# Patient Record
Sex: FEMALE | Race: WHITE | NOT HISPANIC OR LATINO | URBAN - METROPOLITAN AREA
[De-identification: names, ages, dates, MRNs, and addresses within clinical notes are randomized per-mention and may not be internally consistent; named-entity substitution may affect disease eponyms.]

---

## 2018-04-04 ENCOUNTER — OUTPATIENT (OUTPATIENT)
Dept: OUTPATIENT SERVICES | Facility: HOSPITAL | Age: 58
LOS: 1 days | End: 2018-04-04
Payer: MEDICARE

## 2018-04-04 VITALS
DIASTOLIC BLOOD PRESSURE: 72 MMHG | HEART RATE: 88 BPM | TEMPERATURE: 98 F | SYSTOLIC BLOOD PRESSURE: 122 MMHG | HEIGHT: 67 IN | OXYGEN SATURATION: 98 % | WEIGHT: 111.99 LBS | RESPIRATION RATE: 12 BRPM

## 2018-04-04 DIAGNOSIS — K05.6 PERIODONTAL DISEASE, UNSPECIFIED: ICD-10-CM

## 2018-04-04 DIAGNOSIS — F73 PROFOUND INTELLECTUAL DISABILITIES: ICD-10-CM

## 2018-04-04 DIAGNOSIS — Z98.890 OTHER SPECIFIED POSTPROCEDURAL STATES: Chronic | ICD-10-CM

## 2018-04-04 DIAGNOSIS — K02.9 DENTAL CARIES, UNSPECIFIED: ICD-10-CM

## 2018-04-04 DIAGNOSIS — Z98.811 DENTAL RESTORATION STATUS: Chronic | ICD-10-CM

## 2018-04-04 DIAGNOSIS — Z01.818 ENCOUNTER FOR OTHER PREPROCEDURAL EXAMINATION: ICD-10-CM

## 2018-04-04 DIAGNOSIS — K02.62 DENTAL CARIES ON SMOOTH SURFACE PENETRATING INTO DENTIN: ICD-10-CM

## 2018-04-04 PROCEDURE — G0463: CPT

## 2018-04-04 RX ORDER — SODIUM CHLORIDE 9 MG/ML
3 INJECTION INTRAMUSCULAR; INTRAVENOUS; SUBCUTANEOUS EVERY 8 HOURS
Qty: 0 | Refills: 0 | Status: DISCONTINUED | OUTPATIENT
Start: 2018-04-18 | End: 2018-05-03

## 2018-04-04 RX ORDER — LIDOCAINE HCL 20 MG/ML
0.2 VIAL (ML) INJECTION ONCE
Qty: 0 | Refills: 0 | Status: DISCONTINUED | OUTPATIENT
Start: 2018-04-18 | End: 2018-05-03

## 2018-04-04 NOTE — H&P PST ADULT - DOCUMENT STATUS
Additional Information: (Optional): The wound was cleaned, and a pressure dressing was applied.  The patient received detailed post-op instructions. Render Post-Care Instructions In Note?: yes Post-Care Instructions: I reviewed with the patient in detail post-care instructions. Patient is to keep the area dry for 48 hours, and not to engage in any swimming until the area is healed. Should the patient develop any fevers, chills, bleeding, severe pain patient will contact the office immediately. Bill As A Line Item Or As Units: Line Item Size Of Lesion After Curettage: 1.7 Consent was obtained from the patient. The risks, benefits and alternatives to therapy were discussed in detail. Specifically, the risks of infection, scarring, bleeding, prolonged wound healing, nerve injury, incomplete removal, allergy to anesthesia and recurrence were addressed. Alternatives to ED&C, such as: surgical removal were also discussed.  Prior to the procedure, the treatment site was clearly identified and confirmed by the patient. All components of Universal Protocol/PAUSE Rule completed. Detail Level: Detailed Bill For Surgical Tray: no Anesthesia Type: 1% lidocaine with epinephrine and a 1:10 solution of 8.4% sodium bicarbonate Cautery Type: cryotherapy Anesthesia Volume In Cc: 2 What Was Performed First?: Curettage Number Of Curettages: 3 Size Of Lesion In Cm: 1.5 Authored by Resident/PA/NP

## 2018-04-04 NOTE — H&P PST ADULT - NSANTHOSAYNRD_GEN_A_CORE
No. JEREMIAS screening performed.  STOP BANG Legend: 0-2 = LOW Risk; 3-4 = INTERMEDIATE Risk; 5-8 = HIGH Risk

## 2018-04-04 NOTE — H&P PST ADULT - HISTORY OF PRESENT ILLNESS
59 yo female with pmh of Autism, anxiety, profound mr, hypersalivation, presents to [facility] for evaluation for comprehensive dental treatment.

## 2018-04-04 NOTE — H&P PST ADULT - PMH
Autism    Bladder infection    Constipation    Dental caries    Gait abnormality    Hernia of abdominal wall    Hypersalivation    Profound mental retardation

## 2018-04-17 ENCOUNTER — TRANSCRIPTION ENCOUNTER (OUTPATIENT)
Age: 58
End: 2018-04-17

## 2018-04-18 ENCOUNTER — OUTPATIENT (OUTPATIENT)
Dept: OUTPATIENT SERVICES | Facility: HOSPITAL | Age: 58
LOS: 1 days | End: 2018-04-18
Payer: MEDICARE

## 2018-04-18 VITALS
DIASTOLIC BLOOD PRESSURE: 59 MMHG | HEART RATE: 62 BPM | TEMPERATURE: 99 F | RESPIRATION RATE: 20 BRPM | SYSTOLIC BLOOD PRESSURE: 118 MMHG | OXYGEN SATURATION: 100 %

## 2018-04-18 VITALS — TEMPERATURE: 98 F | WEIGHT: 111.99 LBS | HEIGHT: 67 IN

## 2018-04-18 DIAGNOSIS — K05.6 PERIODONTAL DISEASE, UNSPECIFIED: ICD-10-CM

## 2018-04-18 DIAGNOSIS — Z98.811 DENTAL RESTORATION STATUS: Chronic | ICD-10-CM

## 2018-04-18 DIAGNOSIS — Z98.890 OTHER SPECIFIED POSTPROCEDURAL STATES: Chronic | ICD-10-CM

## 2018-04-18 DIAGNOSIS — K02.62 DENTAL CARIES ON SMOOTH SURFACE PENETRATING INTO DENTIN: ICD-10-CM

## 2018-04-18 DIAGNOSIS — Z01.818 ENCOUNTER FOR OTHER PREPROCEDURAL EXAMINATION: ICD-10-CM

## 2018-04-18 PROCEDURE — D2392: CPT

## 2018-04-18 PROCEDURE — D2394: CPT

## 2018-04-18 RX ORDER — ACETAMINOPHEN 500 MG
1000 TABLET ORAL ONCE
Qty: 0 | Refills: 0 | Status: COMPLETED | OUTPATIENT
Start: 2018-04-18 | End: 2018-04-18

## 2018-04-18 RX ORDER — ONDANSETRON 8 MG/1
4 TABLET, FILM COATED ORAL ONCE
Qty: 0 | Refills: 0 | Status: DISCONTINUED | OUTPATIENT
Start: 2018-04-18 | End: 2018-05-03

## 2018-04-18 RX ORDER — SODIUM CHLORIDE 9 MG/ML
1000 INJECTION, SOLUTION INTRAVENOUS
Qty: 0 | Refills: 0 | Status: DISCONTINUED | OUTPATIENT
Start: 2018-04-18 | End: 2018-05-03

## 2018-04-18 RX ADMIN — Medication 400 MILLIGRAM(S): at 10:35

## 2018-04-18 NOTE — ASU PREOP CHECKLIST - ASSESSMENT, HISTORY & PHYSICAL COMPLETED AND ON MEDICAL RECORD
----- Message from Nata Lee RN sent at 8/22/2017  1:34 PM CDT -----  Hi. This is Nata Lee RN. I am with the Outpatient case management team with [facility]. I received a referral on the above patient. I spoke with patient today on the telephone.    Could the Multiple Sclerosis- please get in contact with patient to assist patient with any possible assistance.  Thanks in advance.        Please notify patient of your recommendations.     Thank you,  Nata Lee R.N.  Outpatient Complex Case Manager   done

## 2018-04-18 NOTE — BRIEF OPERATIVE NOTE - POST-OP DX
Dental caries extending into dentin  04/18/2018    Active  Marylu Stratton  Periodontal disease  04/18/2018    Active  Marylu Stratton

## 2018-04-18 NOTE — BRIEF OPERATIVE NOTE - PROCEDURE
<<-----Click on this checkbox to enter Procedure Dental exam, with x-ray imaging, dental cleaning, and restoration  04/18/2018    Active  KIMI

## 2018-04-18 NOTE — ASU DISCHARGE PLAN (ADULT/PEDIATRIC). - SPECIAL INSTRUCTIONS
tylenol prn pain    exam, xrays, scaling and root planing, gingivectomy, restorations and flouride tx

## 2018-09-10 NOTE — ASU DISCHARGE PLAN (ADULT/PEDIATRIC). - ASU FOLLOWUP
[facility] ([facility]): Graft Donor Site Epidermal Sutures (Optional): 5-0 Fast Absorbing Gut

## 2019-05-21 ENCOUNTER — TRANSCRIPTION ENCOUNTER (OUTPATIENT)
Age: 59
End: 2019-05-21

## 2020-01-15 PROBLEM — R26.9 UNSPECIFIED ABNORMALITIES OF GAIT AND MOBILITY: Chronic | Status: ACTIVE | Noted: 2018-04-04

## 2020-01-15 PROBLEM — K43.9 VENTRAL HERNIA WITHOUT OBSTRUCTION OR GANGRENE: Chronic | Status: ACTIVE | Noted: 2018-04-04

## 2020-01-15 PROBLEM — K02.9 DENTAL CARIES, UNSPECIFIED: Chronic | Status: ACTIVE | Noted: 2018-04-04

## 2020-01-15 PROBLEM — K11.7 DISTURBANCES OF SALIVARY SECRETION: Chronic | Status: ACTIVE | Noted: 2018-04-04

## 2020-01-15 PROBLEM — F73 PROFOUND INTELLECTUAL DISABILITIES: Chronic | Status: ACTIVE | Noted: 2018-04-04

## 2020-01-21 ENCOUNTER — OUTPATIENT (OUTPATIENT)
Dept: OUTPATIENT SERVICES | Facility: HOSPITAL | Age: 60
LOS: 1 days | End: 2020-01-21
Payer: MEDICARE

## 2020-01-21 VITALS
HEART RATE: 65 BPM | RESPIRATION RATE: 18 BRPM | HEIGHT: 63 IN | SYSTOLIC BLOOD PRESSURE: 114 MMHG | TEMPERATURE: 98 F | OXYGEN SATURATION: 97 % | WEIGHT: 115.96 LBS | DIASTOLIC BLOOD PRESSURE: 77 MMHG

## 2020-01-21 DIAGNOSIS — Z98.890 OTHER SPECIFIED POSTPROCEDURAL STATES: Chronic | ICD-10-CM

## 2020-01-21 DIAGNOSIS — Z01.818 ENCOUNTER FOR OTHER PREPROCEDURAL EXAMINATION: ICD-10-CM

## 2020-01-21 DIAGNOSIS — Z98.811 DENTAL RESTORATION STATUS: Chronic | ICD-10-CM

## 2020-01-21 DIAGNOSIS — K02.9 DENTAL CARIES, UNSPECIFIED: ICD-10-CM

## 2020-01-21 DIAGNOSIS — K05.6 PERIODONTAL DISEASE, UNSPECIFIED: ICD-10-CM

## 2020-01-21 DIAGNOSIS — K02.62 DENTAL CARIES ON SMOOTH SURFACE PENETRATING INTO DENTIN: ICD-10-CM

## 2020-01-21 LAB
ANION GAP SERPL CALC-SCNC: 10 MMOL/L — SIGNIFICANT CHANGE UP (ref 5–17)
BUN SERPL-MCNC: 18 MG/DL — SIGNIFICANT CHANGE UP (ref 7–23)
CALCIUM SERPL-MCNC: 9.1 MG/DL — SIGNIFICANT CHANGE UP (ref 8.4–10.5)
CHLORIDE SERPL-SCNC: 104 MMOL/L — SIGNIFICANT CHANGE UP (ref 96–108)
CO2 SERPL-SCNC: 26 MMOL/L — SIGNIFICANT CHANGE UP (ref 22–31)
CREAT SERPL-MCNC: 0.61 MG/DL — SIGNIFICANT CHANGE UP (ref 0.5–1.3)
GLUCOSE SERPL-MCNC: 67 MG/DL — LOW (ref 70–99)
HCT VFR BLD CALC: 40.7 % — SIGNIFICANT CHANGE UP (ref 34.5–45)
HGB BLD-MCNC: 12.9 G/DL — SIGNIFICANT CHANGE UP (ref 11.5–15.5)
MCHC RBC-ENTMCNC: 29.4 PG — SIGNIFICANT CHANGE UP (ref 27–34)
MCHC RBC-ENTMCNC: 31.7 GM/DL — LOW (ref 32–36)
MCV RBC AUTO: 92.7 FL — SIGNIFICANT CHANGE UP (ref 80–100)
PLATELET # BLD AUTO: 286 K/UL — SIGNIFICANT CHANGE UP (ref 150–400)
POTASSIUM SERPL-MCNC: 4.1 MMOL/L — SIGNIFICANT CHANGE UP (ref 3.5–5.3)
POTASSIUM SERPL-SCNC: 4.1 MMOL/L — SIGNIFICANT CHANGE UP (ref 3.5–5.3)
RBC # BLD: 4.39 M/UL — SIGNIFICANT CHANGE UP (ref 3.8–5.2)
RBC # FLD: 13.1 % — SIGNIFICANT CHANGE UP (ref 10.3–14.5)
SODIUM SERPL-SCNC: 140 MMOL/L — SIGNIFICANT CHANGE UP (ref 135–145)
WBC # BLD: 4.05 K/UL — SIGNIFICANT CHANGE UP (ref 3.8–10.5)
WBC # FLD AUTO: 4.05 K/UL — SIGNIFICANT CHANGE UP (ref 3.8–10.5)

## 2020-01-21 PROCEDURE — 80048 BASIC METABOLIC PNL TOTAL CA: CPT

## 2020-01-21 PROCEDURE — 85027 COMPLETE CBC AUTOMATED: CPT

## 2020-01-21 PROCEDURE — G0463: CPT

## 2020-01-21 PROCEDURE — 71045 X-RAY EXAM CHEST 1 VIEW: CPT

## 2020-01-21 PROCEDURE — 71045 X-RAY EXAM CHEST 1 VIEW: CPT | Mod: 26

## 2020-01-21 RX ORDER — LIDOCAINE HCL 20 MG/ML
0.2 VIAL (ML) INJECTION ONCE
Refills: 0 | Status: DISCONTINUED | OUTPATIENT
Start: 2020-02-05 | End: 2020-02-22

## 2020-01-21 RX ORDER — SODIUM CHLORIDE 9 MG/ML
3 INJECTION INTRAMUSCULAR; INTRAVENOUS; SUBCUTANEOUS EVERY 8 HOURS
Refills: 0 | Status: DISCONTINUED | OUTPATIENT
Start: 2020-02-05 | End: 2020-02-22

## 2020-01-21 RX ORDER — FAMOTIDINE 10 MG/ML
1 INJECTION INTRAVENOUS
Qty: 0 | Refills: 0 | DISCHARGE

## 2020-01-21 RX ORDER — LACTOBACILLUS ACIDOPHILUS 100MM CELL
1 CAPSULE ORAL
Qty: 0 | Refills: 0 | DISCHARGE

## 2020-01-21 NOTE — H&P PST ADULT - NSICDXPASTMEDICALHX_GEN_ALL_CORE_FT
PAST MEDICAL HISTORY:  Autism     Constipation     Dental caries     Gait abnormality     Hernia of abdominal wall     Hypersalivation     Profound mental retardation

## 2020-01-21 NOTE — H&P PST ADULT - HISTORY OF PRESENT ILLNESS
This is a 58 y/o female with dental caries, she presents today for comprehensive dental treatment.  history obtained from MD notes and group home staff

## 2020-02-04 ENCOUNTER — TRANSCRIPTION ENCOUNTER (OUTPATIENT)
Age: 60
End: 2020-02-04

## 2020-02-04 NOTE — ASU DISCHARGE PLAN (ADULT/PEDIATRIC) - ASU DC SPECIAL INSTRUCTIONSFT
comprehensive dental tx under ga performed    tylenol prn pain    see Dr Stratton in one week    return to program tomorrow

## 2020-02-05 ENCOUNTER — OUTPATIENT (OUTPATIENT)
Dept: OUTPATIENT SERVICES | Facility: HOSPITAL | Age: 60
LOS: 1 days | End: 2020-02-05
Payer: MEDICARE

## 2020-02-05 VITALS
RESPIRATION RATE: 18 BRPM | DIASTOLIC BLOOD PRESSURE: 98 MMHG | WEIGHT: 115.96 LBS | HEIGHT: 63 IN | TEMPERATURE: 97 F | SYSTOLIC BLOOD PRESSURE: 167 MMHG | HEART RATE: 74 BPM

## 2020-02-05 VITALS
OXYGEN SATURATION: 100 % | DIASTOLIC BLOOD PRESSURE: 81 MMHG | TEMPERATURE: 98 F | RESPIRATION RATE: 20 BRPM | SYSTOLIC BLOOD PRESSURE: 124 MMHG | HEART RATE: 86 BPM

## 2020-02-05 DIAGNOSIS — K02.62 DENTAL CARIES ON SMOOTH SURFACE PENETRATING INTO DENTIN: ICD-10-CM

## 2020-02-05 DIAGNOSIS — Z98.811 DENTAL RESTORATION STATUS: Chronic | ICD-10-CM

## 2020-02-05 DIAGNOSIS — Z98.890 OTHER SPECIFIED POSTPROCEDURAL STATES: Chronic | ICD-10-CM

## 2020-02-05 DIAGNOSIS — K05.6 PERIODONTAL DISEASE, UNSPECIFIED: ICD-10-CM

## 2020-02-05 PROCEDURE — D4341: CPT

## 2020-02-05 PROCEDURE — D4210: CPT

## 2020-02-05 RX ORDER — SODIUM CHLORIDE 9 MG/ML
1000 INJECTION, SOLUTION INTRAVENOUS
Refills: 0 | Status: DISCONTINUED | OUTPATIENT
Start: 2020-02-05 | End: 2020-02-22

## 2020-02-05 RX ORDER — ONDANSETRON 8 MG/1
4 TABLET, FILM COATED ORAL ONCE
Refills: 0 | Status: DISCONTINUED | OUTPATIENT
Start: 2020-02-05 | End: 2020-02-22

## 2020-02-05 NOTE — ASU PATIENT PROFILE, ADULT - PMH
Autism    Constipation    Dental caries    Gait abnormality    Hernia of abdominal wall    Hypersalivation    Profound mental retardation

## 2020-02-11 ENCOUNTER — TRANSCRIPTION ENCOUNTER (OUTPATIENT)
Age: 60
End: 2020-02-11

## 2020-11-11 ENCOUNTER — APPOINTMENT (OUTPATIENT)
Dept: ULTRASOUND IMAGING | Facility: CLINIC | Age: 60
End: 2020-11-11
Payer: MEDICARE

## 2020-11-11 PROCEDURE — 76641 ULTRASOUND BREAST COMPLETE: CPT | Mod: 50

## 2021-02-11 NOTE — ASU PATIENT PROFILE, ADULT - NS PRO TALK SOMEONE YN
[facility]    Gastroenterology Progress Note    Identification: Sherice Jewell, 79 year old, White, female   Consulting Provider: Tirso Rgealado MD  Reason for Consultation: Preoperative evaluation of hiatal hernia    Subjective     Source of History: Patient and EMR. Reliable.    Interval History: No acute events overnight.  Not drowsy today. Very mild chest pain. No nausea or vomiting.    A 10-point ROS was reviewed and is negative except as in HPI.    Objective     Physical Examination:  Vitals signs and nursing note: Reviewed.  General: No acute distress.  HENT: Oropharynx is clear without erythema or exudates.  Eyes: Pupils are equal, round, and reactive to light. No scleral icterus.  Cardiovascular: Normal rate and regular rhythm. Normal pulses. Normal heart sounds.   Pulmonary: Normal effort. Normal breath sounds.  Abdomen: Flat. Normal bowel sounds.  Epigastric tenderness.  Musculoskeletal: No lower extremity edema.  Lymphadenopathy: No cervical adenopathy.  Skin: Warm and dry.  Neurological: Alert and oriented to person, place, and time. No focal deficit present.    Assessment and Plan     Summary: Sherice Jewell is a 79 year old female with no significant past medical history who presented to the ER with severe substernal/epigastric pain and was found to have a large retrocardiac hiatal hernia for home the GI service was consulted to perform an EGD.     Problem List:   1. Large retrocardiac hiatal hernia  2. Multiple nonbleeding clean-based Nathaniel erosions  3. Nodular gastric mucosa  4. Edematous duodenal mucosa  5. Severe retrosternal chest pain-most likely due to gastroesophageal reflux in the setting of large hiatal hernia     Recommendations:  · Continue baclofen to 2.5 mg 3 times daily  · Pantoprazole 40 mg twice daily and decrease the dose to OD after surgery  · On TPN per surgical team  · Surgical team planning for hernia repair tomorrow 2/12     Cecilio Reynoso,  MD  Gastroenterology, PGY4     unable to assess

## 2021-02-12 ENCOUNTER — APPOINTMENT (OUTPATIENT)
Dept: RADIOLOGY | Facility: CLINIC | Age: 61
End: 2021-02-12
Payer: MEDICARE

## 2021-02-12 PROCEDURE — 74019 RADEX ABDOMEN 2 VIEWS: CPT

## 2021-08-05 ENCOUNTER — OUTPATIENT (OUTPATIENT)
Dept: OUTPATIENT SERVICES | Facility: HOSPITAL | Age: 61
LOS: 1 days | End: 2021-08-05

## 2021-08-05 DIAGNOSIS — Z98.811 DENTAL RESTORATION STATUS: Chronic | ICD-10-CM

## 2021-08-05 DIAGNOSIS — Z98.890 OTHER SPECIFIED POSTPROCEDURAL STATES: Chronic | ICD-10-CM

## 2021-09-27 ENCOUNTER — EMERGENCY (EMERGENCY)
Facility: HOSPITAL | Age: 61
LOS: 1 days | End: 2021-09-27
Admitting: EMERGENCY MEDICINE
Payer: MEDICARE

## 2021-09-27 DIAGNOSIS — Z98.890 OTHER SPECIFIED POSTPROCEDURAL STATES: Chronic | ICD-10-CM

## 2021-09-27 DIAGNOSIS — Z98.811 DENTAL RESTORATION STATUS: Chronic | ICD-10-CM

## 2021-09-27 PROCEDURE — 74177 CT ABD & PELVIS W/CONTRAST: CPT | Mod: 26

## 2021-09-27 PROCEDURE — 99284 EMERGENCY DEPT VISIT MOD MDM: CPT

## 2021-11-01 PROBLEM — Z00.00 ENCOUNTER FOR PREVENTIVE HEALTH EXAMINATION: Status: ACTIVE | Noted: 2021-11-01

## 2021-11-02 ENCOUNTER — APPOINTMENT (OUTPATIENT)
Dept: ULTRASOUND IMAGING | Facility: CLINIC | Age: 61
End: 2021-11-02
Payer: MEDICARE

## 2021-11-02 PROCEDURE — 76856 US EXAM PELVIC COMPLETE: CPT

## 2021-11-02 PROCEDURE — 76700 US EXAM ABDOM COMPLETE: CPT

## 2021-11-11 ENCOUNTER — APPOINTMENT (OUTPATIENT)
Dept: CT IMAGING | Facility: CLINIC | Age: 61
End: 2021-11-11
Payer: MEDICARE

## 2021-11-11 PROCEDURE — 74176 CT ABD & PELVIS W/O CONTRAST: CPT | Mod: MH

## 2021-11-26 ENCOUNTER — EMERGENCY (EMERGENCY)
Facility: HOSPITAL | Age: 61
LOS: 1 days | End: 2021-11-26
Admitting: EMERGENCY MEDICINE
Payer: MEDICARE

## 2021-11-26 DIAGNOSIS — Z98.890 OTHER SPECIFIED POSTPROCEDURAL STATES: Chronic | ICD-10-CM

## 2021-11-26 DIAGNOSIS — Z98.811 DENTAL RESTORATION STATUS: Chronic | ICD-10-CM

## 2021-11-26 PROCEDURE — 71045 X-RAY EXAM CHEST 1 VIEW: CPT | Mod: 26

## 2021-11-26 PROCEDURE — 99285 EMERGENCY DEPT VISIT HI MDM: CPT

## 2021-11-27 ENCOUNTER — TRANSCRIPTION ENCOUNTER (OUTPATIENT)
Age: 61
End: 2021-11-27

## 2021-11-27 ENCOUNTER — INPATIENT (INPATIENT)
Facility: HOSPITAL | Age: 61
LOS: 22 days | Discharge: NURSING, REHAB OR LONG-TERM CARE | DRG: 329 | End: 2021-12-20
Attending: COLON & RECTAL SURGERY | Admitting: HOSPITALIST
Payer: MEDICARE

## 2021-11-27 VITALS
OXYGEN SATURATION: 97 % | DIASTOLIC BLOOD PRESSURE: 79 MMHG | HEART RATE: 68 BPM | TEMPERATURE: 98 F | SYSTOLIC BLOOD PRESSURE: 128 MMHG | RESPIRATION RATE: 16 BRPM

## 2021-11-27 DIAGNOSIS — Z98.811 DENTAL RESTORATION STATUS: Chronic | ICD-10-CM

## 2021-11-27 DIAGNOSIS — Z98.890 OTHER SPECIFIED POSTPROCEDURAL STATES: Chronic | ICD-10-CM

## 2021-11-27 DIAGNOSIS — K56.2 VOLVULUS: ICD-10-CM

## 2021-11-27 LAB
ALBUMIN SERPL ELPH-MCNC: 3.5 G/DL — SIGNIFICANT CHANGE UP (ref 3.3–5.2)
ALP SERPL-CCNC: 111 U/L — SIGNIFICANT CHANGE UP (ref 40–120)
ALT FLD-CCNC: 74 U/L — HIGH
ANION GAP SERPL CALC-SCNC: 9 MMOL/L — SIGNIFICANT CHANGE UP (ref 5–17)
APTT BLD: 26.2 SEC — LOW (ref 27.5–35.5)
AST SERPL-CCNC: 69 U/L — HIGH
BILIRUB SERPL-MCNC: 0.3 MG/DL — LOW (ref 0.4–2)
BLD GP AB SCN SERPL QL: SIGNIFICANT CHANGE UP
BUN SERPL-MCNC: 19.4 MG/DL — SIGNIFICANT CHANGE UP (ref 8–20)
CALCIUM SERPL-MCNC: 8.7 MG/DL — SIGNIFICANT CHANGE UP (ref 8.6–10.2)
CHLORIDE SERPL-SCNC: 100 MMOL/L — SIGNIFICANT CHANGE UP (ref 98–107)
CO2 SERPL-SCNC: 31 MMOL/L — HIGH (ref 22–29)
CREAT SERPL-MCNC: 0.45 MG/DL — LOW (ref 0.5–1.3)
GLUCOSE SERPL-MCNC: 115 MG/DL — HIGH (ref 70–99)
INR BLD: 1.15 RATIO — SIGNIFICANT CHANGE UP (ref 0.88–1.16)
MAGNESIUM SERPL-MCNC: 2.2 MG/DL — SIGNIFICANT CHANGE UP (ref 1.6–2.6)
NT-PROBNP SERPL-SCNC: 1011 PG/ML — HIGH (ref 0–300)
PHOSPHATE SERPL-MCNC: 2.7 MG/DL — SIGNIFICANT CHANGE UP (ref 2.4–4.7)
POTASSIUM SERPL-MCNC: 3 MMOL/L — LOW (ref 3.5–5.3)
POTASSIUM SERPL-SCNC: 3 MMOL/L — LOW (ref 3.5–5.3)
PROT SERPL-MCNC: 5.7 G/DL — LOW (ref 6.6–8.7)
PROTHROM AB SERPL-ACNC: 13.2 SEC — SIGNIFICANT CHANGE UP (ref 10.6–13.6)
SODIUM SERPL-SCNC: 140 MMOL/L — SIGNIFICANT CHANGE UP (ref 135–145)

## 2021-11-27 PROCEDURE — 74177 CT ABD & PELVIS W/CONTRAST: CPT | Mod: 26

## 2021-11-27 PROCEDURE — 71260 CT THORAX DX C+: CPT | Mod: 26

## 2021-11-27 PROCEDURE — 99223 1ST HOSP IP/OBS HIGH 75: CPT

## 2021-11-27 PROCEDURE — 74018 RADEX ABDOMEN 1 VIEW: CPT | Mod: 26

## 2021-11-27 PROCEDURE — 99285 EMERGENCY DEPT VISIT HI MDM: CPT | Mod: GC

## 2021-11-27 PROCEDURE — 93010 ELECTROCARDIOGRAM REPORT: CPT

## 2021-11-27 PROCEDURE — 99223 1ST HOSP IP/OBS HIGH 75: CPT | Mod: 25

## 2021-11-27 PROCEDURE — 49406 IMAGE CATH FLUID PERI/RETRO: CPT | Mod: 59

## 2021-11-27 PROCEDURE — 45337 SIGMOIDOSCOPY & DECOMPRESS: CPT

## 2021-11-27 RX ORDER — MESALAMINE 400 MG
800 TABLET, DELAYED RELEASE (ENTERIC COATED) ORAL EVERY 12 HOURS
Refills: 0 | Status: DISCONTINUED | OUTPATIENT
Start: 2021-11-27 | End: 2021-11-29

## 2021-11-27 RX ORDER — LORATADINE 10 MG/1
10 TABLET ORAL DAILY
Refills: 0 | Status: DISCONTINUED | OUTPATIENT
Start: 2021-11-27 | End: 2021-11-29

## 2021-11-27 RX ORDER — SODIUM CHLORIDE 9 MG/ML
1000 INJECTION, SOLUTION INTRAVENOUS
Refills: 0 | Status: DISCONTINUED | OUTPATIENT
Start: 2021-11-27 | End: 2021-11-27

## 2021-11-27 RX ORDER — ONDANSETRON 8 MG/1
4 TABLET, FILM COATED ORAL EVERY 4 HOURS
Refills: 0 | Status: DISCONTINUED | OUTPATIENT
Start: 2021-11-27 | End: 2021-11-27

## 2021-11-27 RX ORDER — LANOLIN ALCOHOL/MO/W.PET/CERES
3 CREAM (GRAM) TOPICAL AT BEDTIME
Refills: 0 | Status: DISCONTINUED | OUTPATIENT
Start: 2021-11-27 | End: 2021-11-29

## 2021-11-27 RX ORDER — ONDANSETRON 8 MG/1
4 TABLET, FILM COATED ORAL EVERY 8 HOURS
Refills: 0 | Status: DISCONTINUED | OUTPATIENT
Start: 2021-11-27 | End: 2021-11-29

## 2021-11-27 RX ORDER — HEPARIN SODIUM 5000 [USP'U]/ML
5000 INJECTION INTRAVENOUS; SUBCUTANEOUS EVERY 12 HOURS
Refills: 0 | Status: COMPLETED | OUTPATIENT
Start: 2021-11-27 | End: 2021-11-28

## 2021-11-27 RX ORDER — FLUTICASONE PROPIONATE 50 MCG
1 SPRAY, SUSPENSION NASAL EVERY 12 HOURS
Refills: 0 | Status: DISCONTINUED | OUTPATIENT
Start: 2021-11-27 | End: 2021-11-29

## 2021-11-27 RX ORDER — POTASSIUM CHLORIDE 20 MEQ
10 PACKET (EA) ORAL
Refills: 0 | Status: COMPLETED | OUTPATIENT
Start: 2021-11-27 | End: 2021-11-27

## 2021-11-27 RX ORDER — CHOLECALCIFEROL (VITAMIN D3) 125 MCG
2000 CAPSULE ORAL DAILY
Refills: 0 | Status: DISCONTINUED | OUTPATIENT
Start: 2021-11-27 | End: 2021-11-29

## 2021-11-27 RX ORDER — MESALAMINE 400 MG
375 TABLET, DELAYED RELEASE (ENTERIC COATED) ORAL DAILY
Refills: 0 | Status: DISCONTINUED | OUTPATIENT
Start: 2021-11-27 | End: 2021-11-29

## 2021-11-27 RX ORDER — ESCITALOPRAM OXALATE 10 MG/1
5 TABLET, FILM COATED ORAL DAILY
Refills: 0 | Status: DISCONTINUED | OUTPATIENT
Start: 2021-11-27 | End: 2021-11-29

## 2021-11-27 RX ORDER — ACETAMINOPHEN 500 MG
1000 TABLET ORAL ONCE
Refills: 0 | Status: DISCONTINUED | OUTPATIENT
Start: 2021-11-27 | End: 2021-11-27

## 2021-11-27 RX ORDER — LEVOTHYROXINE SODIUM 125 MCG
25 TABLET ORAL DAILY
Refills: 0 | Status: DISCONTINUED | OUTPATIENT
Start: 2021-11-27 | End: 2021-11-29

## 2021-11-27 RX ORDER — LACTOBACILLUS ACIDOPHILUS 100MM CELL
1 CAPSULE ORAL DAILY
Refills: 0 | Status: DISCONTINUED | OUTPATIENT
Start: 2021-11-27 | End: 2021-11-29

## 2021-11-27 RX ORDER — ACETAMINOPHEN 500 MG
650 TABLET ORAL EVERY 6 HOURS
Refills: 0 | Status: DISCONTINUED | OUTPATIENT
Start: 2021-11-27 | End: 2021-11-29

## 2021-11-27 RX ORDER — ALBUTEROL 90 UG/1
2 AEROSOL, METERED ORAL EVERY 6 HOURS
Refills: 0 | Status: DISCONTINUED | OUTPATIENT
Start: 2021-11-27 | End: 2021-11-29

## 2021-11-27 RX ORDER — POLYETHYLENE GLYCOL 3350 17 G/17G
17 POWDER, FOR SOLUTION ORAL DAILY
Refills: 0 | Status: DISCONTINUED | OUTPATIENT
Start: 2021-11-27 | End: 2021-11-29

## 2021-11-27 RX ADMIN — Medication 100 MILLIEQUIVALENT(S): at 13:38

## 2021-11-27 RX ADMIN — HEPARIN SODIUM 5000 UNIT(S): 5000 INJECTION INTRAVENOUS; SUBCUTANEOUS at 17:29

## 2021-11-27 RX ADMIN — Medication 100 MILLIEQUIVALENT(S): at 17:29

## 2021-11-27 RX ADMIN — Medication 1 SPRAY(S): at 17:30

## 2021-11-27 RX ADMIN — SODIUM CHLORIDE 75 MILLILITER(S): 9 INJECTION, SOLUTION INTRAVENOUS at 12:24

## 2021-11-27 RX ADMIN — Medication 100 MILLIEQUIVALENT(S): at 12:21

## 2021-11-27 NOTE — H&P ADULT - NSICDXPASTMEDICALHX_GEN_ALL_CORE_FT
PAST MEDICAL HISTORY:  Autism     Cataract     Constipation     Depression, major     Hypersalivation     Mentally disabled     Nonverbal     OP (osteoporosis)

## 2021-11-27 NOTE — ED PROVIDER NOTE - OBJECTIVE STATEMENT
62 y/o F hx of MR, non-verbal at baseline, ogilve's syndrome transfer from [facility] for sigmoid volvulus. per caretaker at bedside, states that she was short of breath earlier today and acting more agitated to usual which prompted the initial ER visit. pan CT scan was performed at [facility] which showed possible sigmoid volvulus. per caretaker, patient had bowel movement prior to arrival to the ER. received ketamine for sedation in order to get CT scan at [facility].

## 2021-11-27 NOTE — PROGRESS NOTE ADULT - SUBJECTIVE AND OBJECTIVE BOX
[place] CARDIOLOGY-[facility] Faculty Practice                                                               Office:  [address] [address]                                                              Telephone: [phone]. Fax:[phone]                                                                        CARDIOLOGY CONSULTATION NOTE                                                                                             Consult requested by: AIDA Marie  Reason for Consultation: Pre op colorectal surgery  History obtained by: Patient and medical record   obtained: No    Chief complaint:    Patient is a 61y old  Female who presents with a chief complaint of Volvulus (27 Nov 2021 11:36)        HPI:  61 year old female with pmh of autism, cataracts, constipation, nonverbal at baseline with mental retardation, osteoporosis, hypothyroidism coming to hospital after being transferred from [facility] [place] for sigmoid volvulus. patient unable to provided history given non verbal w/ mental retardation. seen by surgery and GI for flex sigmoidoscopy today via GI for reduction of volvulus. at baseline patient tolerating puree with nectar thick diet. per chart patient was transfer to [facility] [place] [facility] given abdominal distention with agitation and respiratory depression after dinner.  (27 Nov 2021 10:30)    Patient underwent a succesful colonoscopic decompression and rectal tube placement with Dr Duong. Recommendation from colorectal surgery is for colon resection during this hospitalization.    REVIEW OF SYMPTOMS:     Patient seen at bedside, she is autistic, and nonverbal      \  ALLERGIES: Allergies  No Known Allergies        PAST MEDICAL HISTORY  Mentally disabled  Autism  Depression, major  Hypersalivation  Constipation  Nonverbal  OP (osteoporosis)  Cataract        PAST SURGICAL HISTORY  S/P laparotomy        FAMILY HISTORY:  FHx: blood disorder  abo incompatibility    Family history of retinitis pigmentosa        SOCIAL HISTORY: Resides in a group home      CURRENT MEDICATIONS:  torsemide 20 milliGRAM(s) Oral daily    loratadine   escitalopram  mesalamine DR Capsule  mesalamine ER Capsule  polyethylene glycol 3350  cholecalciferol  fluticasone propionate 50 MICROgram(s)/spray Nasal Spray  heparin   Injectable  lactobacillus acidophilus  levothyroxine        HOME MEDICATIONS:      Vital Signs Last 24 Hrs  T(C): 36.6 (27 Nov 2021 16:48), Max: 37.6 (27 Nov 2021 13:50)  T(F): 97.9 (27 Nov 2021 16:48), Max: 99.6 (27 Nov 2021 13:50)  HR: 88 (27 Nov 2021 16:48) (68 - 88)  BP: 104/66 (27 Nov 2021 16:48) (84/55 - 128/79)  BP(mean): 71 (27 Nov 2021 13:50) (60 - 76)  RR: 18 (27 Nov 2021 16:48) (16 - 20)  SpO2: 93% (27 Nov 2021 16:48) (92% - 100%)      PHYSICAL EXAM:  Constitutional: Comfortable . No acute distress.   HEENT: Atraumatic and normocephalic , neck is supple . no JVD. No carotid bruit. PEERL   CNS: A&Ox3. No focal deficits. EOMI. Cranial nerves II-IX are intact.   Lymph Nodes: Cervical : Not palpable.  Respiratory: CTAB  Cardiovascular: S1S2 RRR. No murmur/rubs or gallop.  Gastrointestinal: Soft non-tender and non distended . +Bowel sounds. negative Trinidad's sign.  Extremities: No edema.   Psychiatric: Calm . no agitation.  Skin: No skin rash/ulcers visualized to face, hands or feet.    Intake and output:     LABS:    11-27    140  |  100  |  19.4  ----------------------------<  115<H>  3.0<L>   |  31.0<H>  |  0.45<L>    Ca    8.7      27 Nov 2021 08:47  Phos  2.7     11-27  Mg     2.2     11-27    TPro  5.7<L>  /  Alb  3.5  /  TBili  0.3<L>  /  DBili  x   /  AST  69<H>  /  ALT  74<H>  /  AlkPhos  111  11-27      ;p-BNP=  PT/INR - ( 27 Nov 2021 08:47 )   PT: 13.2 sec;   INR: 1.15 ratio         PTT - ( 27 Nov 2021 08:47 )  PTT:26.2 sec      INTERPRETATION OF TELEMETRY: Reviewed by me.   ECG: Reviewed by me.     RADIOLOGY & ADDITIONAL STUDIES:    X-ray:  reviewed by me.   CT scan:   MRI:                                                                          [place] CARDIOLOGY-[facility] Faculty Practice                                                               Office:  [address] [address]                                                              Telephone: [phone]. Fax:[phone]                                                                        CARDIOLOGY CONSULTATION NOTE                                                                                             Consult requested by: AIDA Marie  Reason for Consultation: Pre op colorectal surgery  History obtained by: Patient and medical record   obtained: No    Chief complaint:    Patient is a 61y old  Female who presents with a chief complaint of Volvulus (27 Nov 2021 11:36)        HPI:  61 year old female with pmh of autism, cataracts, constipation, nonverbal at baseline with mental retardation, osteoporosis, hypothyroidism coming to hospital after being transferred from [facility] for sigmoid volvulus. patient unable to provided history given non verbal w/ mental retardation. seen by surgery and GI for flex sigmoidoscopy today via GI for reduction of volvulus. at baseline patient tolerating puree with nectar thick diet. per chart patient was transfer to [facility] given abdominal distention with agitation and respiratory depression after dinner.  (27 Nov 2021 10:30)    Patient underwent a succesful colonoscopic decompression and rectal tube placement with Dr Duong. Recommendation from colorectal surgery is for colon resection during this hospitalization.    REVIEW OF SYMPTOMS:     Patient seen at bedside, she is autistic, contracted and nonverbal      \  ALLERGIES: Allergies  No Known Allergies        PAST MEDICAL HISTORY  Mentally disabled  Autism  Depression, major  Hypersalivation  Constipation  Nonverbal  OP (osteoporosis)  Cataract        PAST SURGICAL HISTORY  S/P laparotomy        FAMILY HISTORY:  FHx: blood disorder  abo incompatibility    Family history of retinitis pigmentosa    SOCIAL HISTORY: Resides in a group home      CURRENT MEDICATIONS:  torsemide 20 milliGRAM(s) Oral daily    loratadine   escitalopram  mesalamine DR Capsule  mesalamine ER Capsule  polyethylene glycol 3350  cholecalciferol  fluticasone propionate 50 MICROgram(s)/spray Nasal Spray  heparin   Injectable  lactobacillus acidophilus  levothyroxine        HOME MEDICATIONS:      Vital Signs Last 24 Hrs  T(C): 36.6 (27 Nov 2021 16:48), Max: 37.6 (27 Nov 2021 13:50)  T(F): 97.9 (27 Nov 2021 16:48), Max: 99.6 (27 Nov 2021 13:50)  HR: 88 (27 Nov 2021 16:48) (68 - 88)  BP: 104/66 (27 Nov 2021 16:48) (84/55 - 128/79)  BP(mean): 71 (27 Nov 2021 13:50) (60 - 76)  RR: 18 (27 Nov 2021 16:48) (16 - 20)  SpO2: 93% (27 Nov 2021 16:48) (92% - 100%)      PHYSICAL EXAM:  Constitutional: Comfortable . No acute distress.   HEENT: Atraumatic and normocephalic , neck is supple . no JVD. No carotid bruit. PEERL   CNS: A&Ox3. No focal deficits. EOMI. Cranial nerves II-IX are intact.   Lymph Nodes: Cervical : Not palpable.  Respiratory: CTAB  Cardiovascular: S1S2 RRR. No murmur/rubs or gallop.  Gastrointestinal: Soft non-tender and non distended . +Bowel sounds. negative Trinidad's sign.  Extremities: No edema.   Psychiatric: Calm . no agitation.  Skin: No skin rash/ulcers visualized to face, hands or feet.    Intake and output:     LABS:    11-27    140  |  100  |  19.4  ----------------------------<  115<H>  3.0<L>   |  31.0<H>  |  0.45<L>    Ca    8.7      27 Nov 2021 08:47  Phos  2.7     11-27  Mg     2.2     11-27    TPro  5.7<L>  /  Alb  3.5  /  TBili  0.3<L>  /  DBili  x   /  AST  69<H>  /  ALT  74<H>  /  AlkPhos  111  11-27      ;p-BNP=  PT/INR - ( 27 Nov 2021 08:47 )   PT: 13.2 sec;   INR: 1.15 ratio         PTT - ( 27 Nov 2021 08:47 )  PTT:26.2 sec      ECG: Sinus Rhythm low voltage with PRWP in the anterior and lateral leads, questionable old AWMI        ASSESSMENT and PLAN    60 yo female with Severe autism, non verbal at baseline sent in from group home after she became agitated after dinner, noted to have abdominal distention and has a history of 2 prior GI obstructions one treated surgically in the past. She has no known cardiac history however EKG suspicious for prior CAD.  K+ noted to be 3.0 this am replenished with KCL 10 meq x 3.    -Obtain TTE  -Patient not a candidate for stress testing secondary to contractures  -Will call patients mother to discuss clearance for surgery  -Continue to follow BMP  -Maintain K+ > 4; Potassium > 2  -Creat 0.45                                                                          [place] CARDIOLOGY-[facility]/Health system Faculty Practice                                                               Office:  [address] [address]                                                              Telephone: [phone]. Fax:[phone]                                                                        CARDIOLOGY CONSULTATION NOTE                                                                                             Consult requested by: AIDA Marie  Reason for Consultation: Pre op colorectal surgery  History obtained by: Patient and medical record   obtained: No    Chief complaint:    Patient is a 61y old  Female who presents with a chief complaint of Volvulus (27 Nov 2021 11:36)        HPI:  61 year old female with pmh of autism, cataracts, constipation, nonverbal at baseline with mental retardation, osteoporosis, hypothyroidism coming to hospital after being transferred from [facility] [place] for sigmoid volvulus. patient unable to provided history given non verbal w/ mental retardation. seen by surgery and GI for flex sigmoidoscopy today via GI for reduction of volvulus. at baseline patient tolerating puree with nectar thick diet. per chart [place] [facility] [place] distention with agitation and respiratory depression after dinner.  (27 Nov 2021 10:30)    Patient underwent a succesful colonoscopic decompression and rectal tube placement with Dr Duong. Recommendation from colorectal surgery is for colon resection during this hospitalization.    REVIEW OF SYMPTOMS:     Patient seen at bedside, she is autistic, contracted and nonverbal    ALLERGIES: Allergies  No Known Allergies    PAST MEDICAL HISTORY  Mentally disabled  Autism  Depression, major  Hypersalivation  Constipation  Nonverbal  OP (osteoporosis)  Cataract    PAST SURGICAL HISTORY  S/P laparotomy    FAMILY HISTORY:  FHx: blood disorder  abo incompatibility    Family history of retinitis pigmentosa    SOCIAL HISTORY: Resides in a group home    CURRENT MEDICATIONS:  torsemide 20 milliGRAM(s) Oral daily  loratadine   escitalopram  mesalamine DR Capsule  mesalamine ER Capsule  polyethylene glycol 3350  cholecalciferol  fluticasone propionate 50 MICROgram(s)/spray Nasal Spray  heparin   Injectable  lactobacillus acidophilus  levothyroxine    Vital Signs Last 24 Hrs  T(C): 36.6 (27 Nov 2021 16:48), Max: 37.6 (27 Nov 2021 13:50)  T(F): 97.9 (27 Nov 2021 16:48), Max: 99.6 (27 Nov 2021 13:50)  HR: 88 (27 Nov 2021 16:48) (68 - 88)  BP: 104/66 (27 Nov 2021 16:48) (84/55 - 128/79)  BP(mean): 71 (27 Nov 2021 13:50) (60 - 76)  RR: 18 (27 Nov 2021 16:48) (16 - 20)  SpO2: 93% (27 Nov 2021 16:48) (92% - 100%)    PHYSICAL EXAM:  Constitutional: Comfortable . No acute distress.   HEENT: Atraumatic and normocephalic , neck is supple . no JVD. No carotid bruit. PEERL   CNS: A&Ox3. No focal deficits. EOMI. Cranial nerves II-IX are intact.   Lymph Nodes: Cervical : Not palpable.  Respiratory: CTAB  Cardiovascular: S1S2 RRR. No murmur/rubs or gallop.  Gastrointestinal: Soft non-tender and non distended . +Bowel sounds. negative Trinidad's sign.  Extremities: No edema.   Psychiatric: Calm . no agitation.  Skin: No skin rash/ulcers visualized to face, hands or feet.    Intake and output:     LABS:    11-27    140  |  100  |  19.4  ----------------------------<  115<H>  3.0<L>   |  31.0<H>  |  0.45<L>    Ca    8.7      27 Nov 2021 08:47  Phos  2.7     11-27  Mg     2.2     11-27    TPro  5.7<L>  /  Alb  3.5  /  TBili  0.3<L>  /  DBili  x   /  AST  69<H>  /  ALT  74<H>  /  AlkPhos  111  11-27  ;p-BNP=  PT/INR - ( 27 Nov 2021 08:47 )   PT: 13.2 sec;   INR: 1.15 ratio         PTT - ( 27 Nov 2021 08:47 )  PTT:26.2 sec      ECG: Sinus Rhythm low voltage with PRWP in the anterior and lateral leads, questionable old AWMI        ASSESSMENT and PLAN    62 yo female with Severe autism, non verbal at baseline sent in from group home after she became agitated after dinner, noted to have abdominal distention and has a history of 2 prior GI obstructions one treated surgically in the past. She has no known cardiac history however EKG suspicious for prior CAD.  K+ noted to be 3.0 this am replenished with KCL 10 meq x 3.    -Obtain TTE  -Patient not a candidate for stress testing secondary to contractures  -Will call patients mother to discuss clearance for surgery  -Continue to follow BMP  -Maintain K+ > 4; Potassium > 2  -Creat 0.45                                                                          [place] CARDIOLOGY-[facility] Faculty Practice                                                               Office:  [address] [address]                                                              Telephone: [phone]. Fax:[phone]                                                                        CARDIOLOGY CONSULTATION NOTE                                                                                             Consult requested by: AIDA Marie  Reason for Consultation: Pre op colorectal surgery  History obtained by: Patient and medical record   obtained: No    Chief complaint:    Patient is a 61y old  Female who presents with a chief complaint of Volvulus (27 Nov 2021 11:36)        HPI:  61 year old female with pmh of autism, cataracts, constipation, nonverbal at baseline with mental retardation, osteoporosis, hypothyroidism coming to hospital after being transferred from [facility] [place] [facility] for sigmoid volvulus. patient unable to provided history given non verbal w/ mental retardation. seen by surgery and GI for flex sigmoidoscopy today via GI for reduction of volvulus. at baseline patient tolerating puree with nectar thick diet. per chart patient was transfer to [facility] given abdominal distention with agitation and respiratory depression after dinner.  (27 Nov 2021 10:30)    Patient underwent a succesful colonoscopic decompression and rectal tube placement with Dr Duong. Recommendation from colorectal surgery is for colon resection during this hospitalization.    REVIEW OF SYMPTOMS:     Patient seen at bedside, she is autistic, contracted and nonverbal    ALLERGIES: Allergies  No Known Allergies    PAST MEDICAL HISTORY  Mentally disabled  Autism  Depression, major  Hypersalivation  Constipation  Nonverbal  OP (osteoporosis)  Cataract    PAST SURGICAL HISTORY  S/P laparotomy    FAMILY HISTORY:  FHx: blood disorder  abo incompatibility    Family history of retinitis pigmentosa    SOCIAL HISTORY: Resides in a group home    CURRENT MEDICATIONS:  torsemide 20 milliGRAM(s) Oral daily  loratadine   escitalopram  mesalamine DR Capsule  mesalamine ER Capsule  polyethylene glycol 3350  cholecalciferol  fluticasone propionate 50 MICROgram(s)/spray Nasal Spray  heparin   Injectable  lactobacillus acidophilus  levothyroxine    Vital Signs Last 24 Hrs  T(C): 36.6 (27 Nov 2021 16:48), Max: 37.6 (27 Nov 2021 13:50)  T(F): 97.9 (27 Nov 2021 16:48), Max: 99.6 (27 Nov 2021 13:50)  HR: 88 (27 Nov 2021 16:48) (68 - 88)  BP: 104/66 (27 Nov 2021 16:48) (84/55 - 128/79)  BP(mean): 71 (27 Nov 2021 13:50) (60 - 76)  RR: 18 (27 Nov 2021 16:48) (16 - 20)  SpO2: 93% (27 Nov 2021 16:48) (92% - 100%)    PHYSICAL EXAM:  Constitutional: . No acute distress  Respiratory: CTAB  Cardiovascular: S1S2 RRR. No murmur/rubs or gallop.  Gastrointestinal: Soft non-tender and non distended .   Extremities: No edema.   Psychiatric:  no agitation.      Intake and output:     LABS:    11-27    140  |  100  |  19.4  ----------------------------<  115<H>  3.0<L>   |  31.0<H>  |  0.45<L>    Ca    8.7      27 Nov 2021 08:47  Phos  2.7     11-27  Mg     2.2     11-27    TPro  5.7<L>  /  Alb  3.5  /  TBili  0.3<L>  /  DBili  x   /  AST  69<H>  /  ALT  74<H>  /  AlkPhos  111  11-27  ;p-BNP=  PT/INR - ( 27 Nov 2021 08:47 )   PT: 13.2 sec;   INR: 1.15 ratio         PTT - ( 27 Nov 2021 08:47 )  PTT:26.2 sec      ECG: Sinus Rhythm low voltage with PRWP in the anterior and lateral leads, questionable old AWMI        ASSESSMENT and PLAN    60 yo female with Severe autism, non verbal at baseline sent in from group home after she became agitated after dinner, noted to have abdominal distention and has a history of 2 prior GI obstructions one treated surgically in the past. She has no known cardiac history however EKG suspicious for prior CAD.  K+ noted to be 3.0 this am replenished with KCL 10 meq x 3.    -Obtain TTE  -Patient not a candidate for stress testing secondary to contractures  -Will call patients mother to discuss clearance for surgery  -Continue to follow BMP  -Maintain K+ > 4; Potassium > 2  -Creat 0.45

## 2021-11-27 NOTE — ED ADULT NURSE NOTE - CHIEF COMPLAINT QUOTE
Transfer from [facility] for surgical consult for sigmoid volvulus. Patient has severe MR, sits  style and rocks at baseline, nonverbal at baseline. Here with attendant from group home. Patient was given Morphine and Ativan and ultimately Ketamine just to get CT imaging complete, arrives with 18g to R FA. No obvious signs of pain/discomfort present.

## 2021-11-27 NOTE — ED PROVIDER NOTE - PHYSICAL EXAMINATION
General: Well appearing female in no acute distress  HEENT: Normocephalic, atraumatic. Moist mucous membranes. Oropharynx clear. No lymphadenopathy.  Eyes: No scleral icterus. EOMI. HARPREET.  Neck:. Soft and supple. Full ROM without pain. No midline tenderness  Cardiac: Regular rate and regular rhythm. No murmurs, rubs, gallops. Peripheral pulses 2+ and symmetric. No LE edema.  Resp: Lungs CTAB. No wheezes, rales or rhonchi.  Abd: Soft, distended. No guarding or rebound. No scars, masses, or lesions.  Back: Spine midline and non-tender. No CVA tenderness.    Skin: No rashes, abrasions, or lacerations.  Neuro:  non-verbal

## 2021-11-27 NOTE — H&P ADULT - NSHPPHYSICALEXAM_GEN_ALL_CORE
Vital Signs Last 24 Hrs  T(F): 97.8 (27 Nov 2021 05:11), Max: 97.8 (27 Nov 2021 05:11)  HR: 68 (27 Nov 2021 05:11) (68 - 68)  BP: 128/79 (27 Nov 2021 05:11) (128/79 - 128/79)  RR: 16 (27 Nov 2021 05:11) (16 - 16)  SpO2: 97% (27 Nov 2021 05:11) (97% - 97%)    Physical Exam:  Constitutional: NAD, laying in bed contracted  Neck: Soft and supple, No LAD, No JVD  Respiratory: cta b/l no wheezing no rhonchi  Cardiovascular: +s1/s2 no edema b/l le  Gastrointestinal: soft +distention bs+  Vascular: 2+ peripheral pulses  Neurological: unable to assess given patient mental status   Musculoskeletal: unable to assess given patient mental status  : Contraindicated  Breasts: Contraindicated  Rectal: Contraindicated

## 2021-11-27 NOTE — ED ADULT TRIAGE NOTE - CHIEF COMPLAINT QUOTE
Transfer from [facility] for surgical consult for sigmoid volvulus. Patient has severe MR, sits  style and rocks at baseline, nonverbal at baseline. Here with attendant from group home. Patient was given Morphine and Ativan and ultimately Ketamine just to get CT imaging complete, arrives with 18g to R FA. No obvious signs of pain/discomfort present.

## 2021-11-27 NOTE — CONSULT NOTE ADULT - ASSESSMENT
ASSESSMENT: Patient is a 61y old female with severe developmental delay, nonverbal at baseline transferred for sigmoid volvulus    PLAN:    - F/U GI   - Plan to be discussed with Attending, Dr. De La Rosa  ASSESSMENT: Patient is a 61y old female with severe developmental delay, nonverbal at baseline transferred for sigmoid volvulus    PLAN:    - Recommend medicine admission   - GI for sigmoidoscopy/decompression   - Will continue to follow along  - Plan discussed with Attending, Dr. De La Rosa  ASSESSMENT: Patient is a 61y old female with severe developmental delay, nonverbal at baseline transferred for sigmoid volvulus    PLAN:    - Recommend medicine admission   - GI for sigmoidoscopy/decompression   - Replete electrolytes to potassium 4, phosphate 3, magnesium 2  - Will continue to follow along  - Plan discussed with Attending, Dr. De La Rosa

## 2021-11-27 NOTE — ED PROVIDER NOTE - NS ED ROS FT
Mabel Dimas is a 37year unable to obtain due to non-verbal, MR

## 2021-11-27 NOTE — H&P ADULT - ASSESSMENT
61 year old female with pmh of autism, cataracts, constipation, nonverbal at baseline with mental retardation, osteoporosis, hypothyroidism coming to hospital after being transferred from [facility] [place] for sigmoid volvulus. patient unable to provided history given non verbal w/ mental retardation. seen by surgery and GI for flex sigmoidoscopy today via GI for reduction of volvulus. at baseline patient tolerating puree with nectar thick diet. per chart patient was transfer to [facility] [place] given abdominal distention with agitation and respiratory depression after dinner.     #abdominal distention   - w/ volvulus  - gi consult appreciated - for flexible sigmoidoscopy  - surgery consult appreciated  - npo given going to OR    #nonverbal w/ mental retardation   - at baseline  - supportive care    #IBS  - mesalamine    #depression   - escitalopram    #hypothyroidism  - levothyroxine  - check tsh    #dvt prophylaxis  - heparin SC   IMPROVE VTE Individual Risk Assessment  RISK                                                                Points  [  ] Previous VTE                                                  3  [  ] Thrombophilia                                               2  [  ] Lower limb paralysis                                      2        (unable to hold up >15 seconds)    [  ] Current Cancer                                              2         (within 6 months)  [  ] Immobilization > 24 hrs                                1  [  ] ICU/CCU stay > 24 hours                              1  [x  ] Age > 60                                                      1    IMPROVE VTE Score ____1_____    IMPROVE Score 0-1: Low Risk, No VTE prophylaxis required for most patients, encourage ambulation.   IMPROVE Score 2-3: At risk, pharmacologic VTE prophylaxis is indicated for most patients (in the absence of a contraindication)  IMPROVE Score > or = 4: High Risk, pharmacologic VTE prophylaxis is indicated for most patients (in the absence of a contraindication)    spoke to patient rodriguez Howe on phone [phone] to obtain family and surgical history; hospital course reviewed with her     spoke to patient group [facility] [phone] advised they should continue to have group home staff at hospital to help with patient care.

## 2021-11-27 NOTE — PROGRESS NOTE ADULT - SUBJECTIVE AND OBJECTIVE BOX
patient underwent a very succesful colonoscopic decompression and rectal tube placement  with Dr. Duong. abdomen much softer and flat after procedure. we recommend that the patient undergo colon resection during this hospital stay. we will discuss with patients mother.,

## 2021-11-27 NOTE — ED PROVIDER NOTE - PROGRESS NOTE DETAILS
dedra: spoke to  who is aware of patient and requested colorectal surgery to be consulted. colorectal consulted. Francis: Seen by GI and colorectal; requesting medicine admission.  Dr. Duong from GI to take pt to OR.

## 2021-11-27 NOTE — H&P ADULT - HISTORY OF PRESENT ILLNESS
61 year old female with pmh of autism, cataracts, constipation, nonverbal at baseline with mental retardation, osteoporosis, hypothyroidism coming to hospital after being transferred from [facility] [place] for sigmoid volvulus. patient unable to provided history given non verbal w/ mental retardation. seen by surgery and GI for flex sigmoidoscopy today via GI for reduction of volvulus. at baseline patient tolerating puree with nectar thick diet. per chart patient was transfer to [facility] [place] [place] given abdominal distention with agitation and respiratory depression after dinner.

## 2021-11-27 NOTE — CONSULT NOTE ADULT - ASSESSMENT
Patient with abdominal pain/distension/Possible sigmoid volvulus in setting of autistic person. She is non verbal. Cannot give any history. Spoke to patient mother Carley at length. Her number is [phone]. Will schedule the patient with emergent sigmoidoscopy with decompression. Start IV fluids. NPO. Covid test already done. Colorectal surgery on board.

## 2021-11-27 NOTE — ED ADULT NURSE NOTE - OBJECTIVE STATEMENT
Assumed care of patient in b8r. Pt. transferred [place] [facility] [place] [place] for sigmoid volvulus. Pt pmhx of severe mr, nonverbal at baseline here with aide from group home. Pt. arrives with 18g to [facility]. pt educated on plan of care, pt able to successfully teach back plan of care to RN, RN will continue to reeducate pt during hospital stay.

## 2021-11-27 NOTE — H&P ADULT - NSICDXFAMILYHX_GEN_ALL_CORE_FT
FAMILY HISTORY:  Family history of retinitis pigmentosa  FHx: blood disorder, abo incompatibility

## 2021-11-27 NOTE — ED PROVIDER NOTE - ATTENDING CONTRIBUTION TO CARE
I have personally performed a face to face medical and diagnostic evaluation of the patient. I have discussed with and reviewed the resident's note and agree with the History, ROS, Physical Exam and MDM unless otherwise indicated. A brief summary of my personal evaluation and impression can be found below.    60yo woman PMH MR and nonverbal at baseline was transferred from [facility] [place] for evaluation of sigmoid volvulus. Hx obtained from caretaker at bedside. Pt seemed to have SOB and more agitated than at baseline with rocking back and forth violently and pt was taken to ED for evaluation. No noted vomiting. No fevers. Pt was sedated at [facility] with ketamine for evaluation because she was very agitated. She is currently at her normal baseline per aide.  On exam, initial vitals were reviewed.  Pt is a chronically ill-appearing elderly woman in no acute distress/resting comfortably. NC/AT, mildly dry mucous membranes, RRR, pulses 2+ in all extremities, breathing comfortably on room air, abdomen soft but nondistended, and nontender to palpation, no obvious joint deformities, pt is sleeping and nonverbal and withdraws all extremities to mild touch but does not follow commands which is baseline per care taker.  CT findings suggestive of sigmoid volvulus at [facility]. Repeat xray obtained which is concerning for volvulus. Consulted GI and surgery for evaluation. Will require admission for definitive management.

## 2021-11-27 NOTE — ASU PATIENT PROFILE, ADULT - MEDICATIONS BROUGHT TO HOSPITAL, PROFILE
no
pt to ED c/o CP that started 1 hour PTA. Pt describes the CP as tightness. Pt states that CP started when she was standing and drinking coffee. States that she is currently having the CP. Pt also c/o neck pain and shoulder pain that also started today. Pt has had prior stress test to evaluate  for a heart regurgitation.

## 2021-11-27 NOTE — CONSULT NOTE ADULT - SUBJECTIVE AND OBJECTIVE BOX
COLORECTAL SURGERY CONSULT     HPI: 61y Female present to ED as transfer from [facility] [place] for possible sigmoid volvulus. Patient lives in long-term care facility and has aid at beside who provides history. After dinner, around 6pm, patient started displaying increased agitation and respiratory distress which prompted the evaluation at [facility] [place]. She appeared more distended than usual. Has been passing flatus and had a BM just prior to this exam. She has a history of obstruction x2 with one surgical intervention.     Consult called at: 0630  Consult seen at: 0645    ROS: 10-system review is otherwise negative except HPI above.      PAST MEDICAL & SURGICAL HISTORY:    FAMILY HISTORY:    Family history not pertinent as reviewed with the patient.    SOCIAL HISTORY:  Denies any toxic habits    ALLERGIES: NKA Allergy Status Unknown      HOME MEDICATIONS:  Home Medications:      --------------------------------------------------------------------------------------------    PHYSICAL EXAM:   General: NAD, sitting cross-legged, resting calmly over legs  Cardio: RRR  Resp: Non labored breathing on RA  GI/Abd: Soft, markedly distended abdomen, tympanitic to percussion, no appreciable pain on exam  Vascular: All 4 extremities warm and well perfused.   Psych: calm, severe mental delay, nonverbal   Musculoskeletal: occasionally moving extremities  --------------------------------------------------------------------------------------------    LABS    pending      --------------------------------------------------------------------------------------------  IMAGING    [facility] CT 11/26/21: Markedly primarily air dilated colon suggestive of but not definitive for volvulus as described above. Marked dilatation of the colon and redundancy with resultant crowding of organs and patient positioning markedly limits evaluation. There is no colonic wall thickening. There is no small bowel obstruction.       COLORECTAL SURGERY CONSULT     HPI: 61y Female present to ED as transfer from [facility] for possible sigmoid volvulus. Patient lives in long-term care facility and has aid at beside who provides history. After dinner, around 6pm, patient started displaying increased agitation and respiratory distress which prompted the evaluation at [facility]. She appeared more distended than usual. Has been passing flatus and had a BM just prior to this exam. She has a history of obstruction x2 with one surgical intervention.     Consult called at: 0630  Consult seen at: 0645    ROS: 10-system review is otherwise negative except HPI above.      PAST MEDICAL & SURGICAL HISTORY:    FAMILY HISTORY:    Family history not pertinent as reviewed with the patient.    SOCIAL HISTORY:  Denies any toxic habits    ALLERGIES: NKA Allergy Status Unknown      HOME MEDICATIONS:  Home Medications:      --------------------------------------------------------------------------------------------    PHYSICAL EXAM:   General: NAD, sitting cross-legged, resting calmly over legs  Cardio: RRR  Resp: Non labored breathing on RA  GI/Abd: Soft, markedly distended abdomen, tympanitic to percussion, no evidence of pain on exam  Vascular: All 4 extremities warm and well perfused.   Psych: calm, severe mental delay, nonverbal   Musculoskeletal: occasionally moving extremities  --------------------------------------------------------------------------------------------    LABS    pending      --------------------------------------------------------------------------------------------  IMAGING    [facility] CT 11/26/21: Markedly primarily air dilated colon suggestive of but not definitive for volvulus as described above. Marked dilatation of the colon and redundancy with resultant crowding of organs and patient positioning markedly limits evaluation. There is no colonic wall thickening. There is no small bowel obstruction.

## 2021-11-27 NOTE — PROGRESS NOTE ADULT - ATTENDING COMMENTS
Above noted. Pt is unable to give us a history. Chart review as done.  Pt with volvulus, some improvement with colonoscopy.   Seen by colorectal surgery.   Pt is bedbound and has no functional capacity. Her EKG shows low voltage. Weather this is from kyphosis or not needs to be checked with an echocardiogram.  she does not have a Hx of DM or CKD by labs.  I tried to call her mother on the phone numbers in the chart and called her place of residence, they gave me a new number [phone]. There was no answer on that line either.   Further recommendations once I can review TTE and speak to her NOK.  Thank you.

## 2021-11-27 NOTE — CONSULT NOTE ADULT - SUBJECTIVE AND OBJECTIVE BOX
HPI:61y Female with Autism, profound intelligence/depression, hypersalivation/constipation/colitis/ abdominal hernia/pseudo obstruction/ abdominal surgery for foreign body ingestion resent to ED as transfer from [facility] [place] for possible sigmoid volvulus. Patient lives in long-term care facility and has aid at beside who provides history. After dinner, around 6pm, patient started displaying increased agitation and respiratory distress which prompted the evaluation at [facility]. She appeared more distended than usual. Has been passing flatus and had a BM just prior to this exam. She has a history of obstruction x2 with one surgical intervention.       PAST MEDICAL & SURGICAL HISTORY:  Mentally disabled        ROS:  Could not be obtained      MEDICATIONS  (STANDING):    MEDICATIONS  (PRN):      Allergies    Allergy Status Unknown    Intolerances    Medications:    Mesalamine, Imodium, fexafenadine, prolia, cranberry, escitalopram, levothyroxine, acidophyllius, torsemide, vitamin C, melatonin, ketoconazole,    SOCIAL HISTORY:    ENDOSCOPIC/GI HISTORY:    FAMILY HISTORY:      Vital Signs Last 24 Hrs  T(C): 36.6 (27 Nov 2021 05:11), Max: 36.6 (27 Nov 2021 05:11)  T(F): 97.8 (27 Nov 2021 05:11), Max: 97.8 (27 Nov 2021 05:11)  HR: 68 (27 Nov 2021 05:11) (68 - 68)  BP: 128/79 (27 Nov 2021 05:11) (128/79 - 128/79)  BP(mean): --  RR: 16 (27 Nov 2021 05:11) (16 - 16)  SpO2: 97% (27 Nov 2021 05:11) (97% - 97%)    PHYSICAL EXAM:    GENERAL: NAD, well-groomed, well-developed  HEAD:  Atraumatic, Normocephalic  EYES: EOMI, PERRLA, conjunctiva and sclera clear  ENMT: No tonsillar erythema, exudates, or enlargement; Moist mucous membranes, Good dentition, No lesions  NECK: Supple, No JVD, Normal thyroid  CHEST/LUNG: Clear to percussion bilaterally; No rales, rhonchi, wheezing, or rubs  HEART: Regular rate and rhythm; No murmurs, rubs, or gallops  ABDOMEN: Soft, Nontender, Nondistended; Bowel sounds present  EXTREMITIES:  2+ Peripheral Pulses, No clubbing, cyanosis, or edema  LYMPH: No lymphadenopathy noted  SKIN: No rashes or lesions      LABS:                       RADIOLOGY & ADDITIONAL STUDIES:   HPI:61y Female with Autism, profound intelligence/depression, hypersalivation/constipation/colitis/ abdominal hernia/pseudo obstruction/ abdominal surgery for foreign body ingestion [place] ED [place] [facility] [place]. Patient lives in long-term care facility and has aid at beside who provides history. After dinner, around 6pm, patient started displaying increased agitation and respiratory distress which [place] [facility] [place] She appeared more distended than usual. Has been passing flatus and had a BM just prior to this exam. She has a history of obstruction x2 with one surgical intervention.       PAST MEDICAL & SURGICAL HISTORY:  Mentally disabled        ROS:  Could not be obtained      MEDICATIONS  (STANDING):    MEDICATIONS  (PRN):      Allergies    Allergy Status Unknown    Intolerances    Medications:    Mesalamine, Imodium, fexafenadine, prolia, cranberry, escitalopram, levothyroxine, acidophyllius, torsemide, vitamin C, melatonin, ketoconazole,    SOCIAL HISTORY:    ENDOSCOPIC/GI HISTORY:    FAMILY HISTORY:      Vital Signs Last 24 Hrs  T(C): 36.6 (27 Nov 2021 05:11), Max: 36.6 (27 Nov 2021 05:11)  T(F): 97.8 (27 Nov 2021 05:11), Max: 97.8 (27 Nov 2021 05:11)  HR: 68 (27 Nov 2021 05:11) (68 - 68)  BP: 128/79 (27 Nov 2021 05:11) (128/79 - 128/79)  BP(mean): --  RR: 16 (27 Nov 2021 05:11) (16 - 16)  SpO2: 97% (27 Nov 2021 05:11) (97% - 97%)    PHYSICAL EXAM:    GENERAL: NAD, Sitting in [place]olese style   HEAD:  Atraumatic, Normocephalic  EYES: EOMI, PERRLA, conjunctiva and sclera clear  ENMT: No tonsillar erythema, exudates, or enlargement; Moist mucous membranes, Good dentition, No lesions  NECK: Supple, No JVD, Normal thyroid  CHEST/LUNG: Clear to percussion bilaterally; No rales, rhonchi, wheezing, or rubs  HEART: Regular rate and rhythm; No murmurs, rubs, or gallops  ABDOMEN: Soft, Nontender, distended; Bowel sounds present  EXTREMITIES:  2+ Peripheral Pulses, No clubbing, cyanosis, or edema  LYMPH: No lymphadenopathy noted  SKIN: No rashes or lesions      LABS:    Reviewed                   RADIOLOGY & ADDITIONAL STUDIES:  Ct abdomen reviewed

## 2021-11-27 NOTE — CHART NOTE - NSCHARTNOTEFT_GEN_A_CORE
d/w Dr Duong - Gastroenterology    spoke to patient mother Carley in AM on phone [phone]    agree with Dr Duong patient need for flexible sigmoidoscopy w/ decompression is a emergent procedure and will be life-saving at this time. GI further unable to reach mother for consent. I also tried to call x 2 while writing this note no answer. benefits of procedure outweigh risks at this time

## 2021-11-27 NOTE — ED PROVIDER NOTE - CLINICAL SUMMARY MEDICAL DECISION MAKING FREE TEXT BOX
60 y/o F hx of MR, non-verbal at baseline, ogilve's syndrome transfer from [facility] for sigmoid volvulus. per caretaker at bedside, states that she was short of breath earlier today and acting more agitated to usual which prompted the initial ER visit  possible sigmoid volvulus per CT scan performed   abdomen distended, passed bowel movement per caretaker   xray abdomen   GI consult

## 2021-11-28 ENCOUNTER — TRANSCRIPTION ENCOUNTER (OUTPATIENT)
Age: 61
End: 2021-11-28

## 2021-11-28 LAB
ANION GAP SERPL CALC-SCNC: 11 MMOL/L — SIGNIFICANT CHANGE UP (ref 5–17)
BUN SERPL-MCNC: 22.1 MG/DL — HIGH (ref 8–20)
CALCIUM SERPL-MCNC: 8.5 MG/DL — LOW (ref 8.6–10.2)
CHLORIDE SERPL-SCNC: 107 MMOL/L — SIGNIFICANT CHANGE UP (ref 98–107)
CO2 SERPL-SCNC: 27 MMOL/L — SIGNIFICANT CHANGE UP (ref 22–29)
COVID-19 NUCLEOCAPSID GAM AB INTERP: NEGATIVE — SIGNIFICANT CHANGE UP
COVID-19 NUCLEOCAPSID TOTAL GAM ANTIBODY RESULT: 0.08 INDEX — SIGNIFICANT CHANGE UP
COVID-19 SPIKE DOMAIN AB INTERP: POSITIVE
COVID-19 SPIKE DOMAIN ANTIBODY RESULT: >250 U/ML — HIGH
CREAT SERPL-MCNC: 0.58 MG/DL — SIGNIFICANT CHANGE UP (ref 0.5–1.3)
GLUCOSE SERPL-MCNC: 107 MG/DL — HIGH (ref 70–99)
HCT VFR BLD CALC: 35.3 % — SIGNIFICANT CHANGE UP (ref 34.5–45)
HCV AB S/CO SERPL IA: 0.09 S/CO — SIGNIFICANT CHANGE UP (ref 0–0.99)
HCV AB SERPL-IMP: SIGNIFICANT CHANGE UP
HGB BLD-MCNC: 11.6 G/DL — SIGNIFICANT CHANGE UP (ref 11.5–15.5)
MCHC RBC-ENTMCNC: 28.4 PG — SIGNIFICANT CHANGE UP (ref 27–34)
MCHC RBC-ENTMCNC: 32.9 GM/DL — SIGNIFICANT CHANGE UP (ref 32–36)
MCV RBC AUTO: 86.5 FL — SIGNIFICANT CHANGE UP (ref 80–100)
PLATELET # BLD AUTO: 276 K/UL — SIGNIFICANT CHANGE UP (ref 150–400)
POTASSIUM SERPL-MCNC: 2.6 MMOL/L — CRITICAL LOW (ref 3.5–5.3)
POTASSIUM SERPL-SCNC: 2.6 MMOL/L — CRITICAL LOW (ref 3.5–5.3)
RBC # BLD: 4.08 M/UL — SIGNIFICANT CHANGE UP (ref 3.8–5.2)
RBC # FLD: 13.4 % — SIGNIFICANT CHANGE UP (ref 10.3–14.5)
SARS-COV-2 IGG+IGM SERPL QL IA: 0.08 INDEX — SIGNIFICANT CHANGE UP
SARS-COV-2 IGG+IGM SERPL QL IA: >250 U/ML — HIGH
SARS-COV-2 IGG+IGM SERPL QL IA: NEGATIVE — SIGNIFICANT CHANGE UP
SARS-COV-2 IGG+IGM SERPL QL IA: POSITIVE
SARS-COV-2 RNA SPEC QL NAA+PROBE: SIGNIFICANT CHANGE UP
SODIUM SERPL-SCNC: 145 MMOL/L — SIGNIFICANT CHANGE UP (ref 135–145)
TROPONIN T SERPL-MCNC: 0.04 NG/ML — SIGNIFICANT CHANGE UP (ref 0–0.06)
TSH SERPL-MCNC: 2.1 UIU/ML — SIGNIFICANT CHANGE UP (ref 0.27–4.2)
WBC # BLD: 15.83 K/UL — HIGH (ref 3.8–10.5)
WBC # FLD AUTO: 15.83 K/UL — HIGH (ref 3.8–10.5)

## 2021-11-28 PROCEDURE — 99233 SBSQ HOSP IP/OBS HIGH 50: CPT

## 2021-11-28 PROCEDURE — 99232 SBSQ HOSP IP/OBS MODERATE 35: CPT

## 2021-11-28 PROCEDURE — 93306 TTE W/DOPPLER COMPLETE: CPT | Mod: 26

## 2021-11-28 RX ORDER — METRONIDAZOLE 500 MG
1000 TABLET ORAL
Refills: 0 | Status: DISCONTINUED | OUTPATIENT
Start: 2021-11-28 | End: 2021-11-29

## 2021-11-28 RX ORDER — POTASSIUM CHLORIDE 20 MEQ
10 PACKET (EA) ORAL
Refills: 0 | Status: COMPLETED | OUTPATIENT
Start: 2021-11-28 | End: 2021-11-28

## 2021-11-28 RX ORDER — SODIUM CHLORIDE 9 MG/ML
1000 INJECTION INTRAMUSCULAR; INTRAVENOUS; SUBCUTANEOUS
Refills: 0 | Status: DISCONTINUED | OUTPATIENT
Start: 2021-11-28 | End: 2021-11-29

## 2021-11-28 RX ORDER — POTASSIUM CHLORIDE 20 MEQ
40 PACKET (EA) ORAL ONCE
Refills: 0 | Status: COMPLETED | OUTPATIENT
Start: 2021-11-28 | End: 2021-11-28

## 2021-11-28 RX ORDER — POTASSIUM CHLORIDE 20 MEQ
40 PACKET (EA) ORAL ONCE
Refills: 0 | Status: DISCONTINUED | OUTPATIENT
Start: 2021-11-28 | End: 2021-11-28

## 2021-11-28 RX ORDER — NEOMYCIN SULFATE 500 MG/1
1000 TABLET ORAL
Refills: 0 | Status: DISCONTINUED | OUTPATIENT
Start: 2021-11-28 | End: 2021-11-29

## 2021-11-28 RX ORDER — SOD SULF/SODIUM/NAHCO3/KCL/PEG
2000 SOLUTION, RECONSTITUTED, ORAL ORAL ONCE
Refills: 0 | Status: COMPLETED | OUTPATIENT
Start: 2021-11-28 | End: 2021-11-28

## 2021-11-28 RX ADMIN — Medication 20 MILLIGRAM(S): at 15:18

## 2021-11-28 RX ADMIN — NEOMYCIN SULFATE 1000 MILLIGRAM(S): 500 TABLET ORAL at 21:58

## 2021-11-28 RX ADMIN — SODIUM CHLORIDE 60 MILLILITER(S): 9 INJECTION INTRAMUSCULAR; INTRAVENOUS; SUBCUTANEOUS at 18:36

## 2021-11-28 RX ADMIN — ESCITALOPRAM OXALATE 5 MILLIGRAM(S): 10 TABLET, FILM COATED ORAL at 11:11

## 2021-11-28 RX ADMIN — Medication 2000 UNIT(S): at 11:14

## 2021-11-28 RX ADMIN — Medication 1 SPRAY(S): at 17:48

## 2021-11-28 RX ADMIN — Medication 40 MILLIEQUIVALENT(S): at 09:06

## 2021-11-28 RX ADMIN — Medication 1 TABLET(S): at 11:16

## 2021-11-28 RX ADMIN — NEOMYCIN SULFATE 1000 MILLIGRAM(S): 500 TABLET ORAL at 15:18

## 2021-11-28 RX ADMIN — Medication 1000 MILLIGRAM(S): at 15:19

## 2021-11-28 RX ADMIN — LORATADINE 10 MILLIGRAM(S): 10 TABLET ORAL at 11:15

## 2021-11-28 RX ADMIN — Medication 100 MILLIEQUIVALENT(S): at 09:06

## 2021-11-28 RX ADMIN — Medication 1000 MILLIGRAM(S): at 21:58

## 2021-11-28 RX ADMIN — Medication 100 MILLIEQUIVALENT(S): at 11:04

## 2021-11-28 RX ADMIN — Medication 800 MILLIGRAM(S): at 17:48

## 2021-11-28 RX ADMIN — Medication 40 MILLIEQUIVALENT(S): at 18:36

## 2021-11-28 RX ADMIN — Medication 1 SPRAY(S): at 05:13

## 2021-11-28 RX ADMIN — Medication 100 MILLIEQUIVALENT(S): at 12:05

## 2021-11-28 RX ADMIN — HEPARIN SODIUM 5000 UNIT(S): 5000 INJECTION INTRAVENOUS; SUBCUTANEOUS at 17:48

## 2021-11-28 RX ADMIN — HEPARIN SODIUM 5000 UNIT(S): 5000 INJECTION INTRAVENOUS; SUBCUTANEOUS at 05:13

## 2021-11-28 NOTE — PROGRESS NOTE ADULT - SUBJECTIVE AND OBJECTIVE BOX
HEALTH ISSUES - PROBLEM Dx:    Sigmoid Volvulus    INTERVAL HPI/ OVERNIGHT EVENTS:    pt lying in bed  asleep  appears comfortable  severely contracted   rectal tube in place with stools  although clears were started today, she has not taken in anything    REVIEW OF SYSTEMS:    as above    Vital Signs Last 24 Hrs  T(C): 36.9 (28 Nov 2021 15:42), Max: 37.4 (28 Nov 2021 09:30)  T(F): 98.4 (28 Nov 2021 15:42), Max: 99.4 (28 Nov 2021 09:30)  HR: 100 (28 Nov 2021 15:42) (88 - 100)  BP: 100/68 (28 Nov 2021 15:42) (93/62 - 100/68)  BP(mean): --  RR: 18 (28 Nov 2021 15:42) (17 - 18)  SpO2: 92% (28 Nov 2021 15:42) (92% - 95%)    PHYSICAL EXAM-  GENERAL: comfortable, asleep  HEAD:  Atraumatic, Normocephalic  EYES: asleep  NECK: No JVD,   NERVOUS SYSTEM: Asleep, severely contracted with yonathan knees to the chest and crossed over each other  CHEST/LUNG: CTA bilaterally; No rales, rhonchi, wheezing, or rubs  HEART: Regular rate and rhythm; No murmurs, rubs, or gallops  ABDOMEN: Soft, Nondistended; Bowel sounds present; Rectal tube in with stools+  EXTREMITIES:  2+ Peripheral Pulses, No clubbing, cyanosis, or edema  SKIN: No rashes or lesions    MEDICATIONS  (STANDING):  cholecalciferol 2000 Unit(s) Oral daily  escitalopram 5 milliGRAM(s) Oral daily  fluticasone propionate 50 MICROgram(s)/spray Nasal Spray 1 Spray(s) Both Nostrils every 12 hours  heparin   Injectable 5000 Unit(s) SubCutaneous every 12 hours  lactobacillus acidophilus 1 Tablet(s) Oral daily  levothyroxine 25 MICROGram(s) Oral daily  loratadine 10 milliGRAM(s) Oral daily  mesalamine DR Capsule 800 milliGRAM(s) Oral every 12 hours  mesalamine ER Capsule 375 milliGRAM(s) Oral daily  metroNIDAZOLE    Tablet 1000 milliGRAM(s) Oral <User Schedule>  neomycin 1000 milliGRAM(s) Oral <User Schedule>  polyethylene glycol 3350 17 Gram(s) Oral daily  torsemide 20 milliGRAM(s) Oral daily    MEDICATIONS  (PRN):  acetaminophen     Tablet .. 650 milliGRAM(s) Oral every 6 hours PRN Temp greater or equal to 38C (100.4F), Mild Pain (1 - 3)  ALBUTerol    90 MICROgram(s) HFA Inhaler 2 Puff(s) Inhalation every 6 hours PRN Shortness of Breath and/or Wheezing  aluminum hydroxide/magnesium hydroxide/simethicone Suspension 30 milliLiter(s) Oral every 4 hours PRN Dyspepsia  melatonin 3 milliGRAM(s) Oral at bedtime PRN Insomnia  ondansetron Injectable 4 milliGRAM(s) IV Push every 8 hours PRN Nausea and/or Vomiting      LABS:                        11.6   15.83 )-----------( 276      ( 28 Nov 2021 06:18 )             35.3     11-28    145  |  107  |  22.1<H>  ----------------------------<  107<H>  2.6<LL>   |  27.0  |  0.58    Ca    8.5<L>      28 Nov 2021 06:18  Phos  2.7     11-27  Mg     2.2     11-27    TPro  5.7<L>  /  Alb  3.5  /  TBili  0.3<L>  /  DBili  x   /  AST  69<H>  /  ALT  74<H>  /  AlkPhos  111  11-27    PT/INR - ( 27 Nov 2021 08:47 )   PT: 13.2 sec;   INR: 1.15 ratio         PTT - ( 27 Nov 2021 08:47 )  PTT:26.2 sec

## 2021-11-28 NOTE — PROGRESS NOTE ADULT - SUBJECTIVE AND OBJECTIVE BOX
HPI/OVERNIGHT EVENTS:  POD 1 s/p sigmoid decompression via endoscopy with LITZY LOPEZ. Abdomen soft, rectal tube in place. Patient unable to provide subjective information.     MEDICATIONS  (STANDING):  cholecalciferol 2000 Unit(s) Oral daily  escitalopram 5 milliGRAM(s) Oral daily  fluticasone propionate 50 MICROgram(s)/spray Nasal Spray 1 Spray(s) Both Nostrils every 12 hours  heparin   Injectable 5000 Unit(s) SubCutaneous every 12 hours  lactobacillus acidophilus 1 Tablet(s) Oral daily  levothyroxine 25 MICROGram(s) Oral daily  loratadine 10 milliGRAM(s) Oral daily  mesalamine DR Capsule 800 milliGRAM(s) Oral every 12 hours  mesalamine ER Capsule 375 milliGRAM(s) Oral daily  polyethylene glycol 3350 17 Gram(s) Oral daily  torsemide 20 milliGRAM(s) Oral daily    MEDICATIONS  (PRN):  acetaminophen     Tablet .. 650 milliGRAM(s) Oral every 6 hours PRN Temp greater or equal to 38C (100.4F), Mild Pain (1 - 3)  ALBUTerol    90 MICROgram(s) HFA Inhaler 2 Puff(s) Inhalation every 6 hours PRN Shortness of Breath and/or Wheezing  aluminum hydroxide/magnesium hydroxide/simethicone Suspension 30 milliLiter(s) Oral every 4 hours PRN Dyspepsia  melatonin 3 milliGRAM(s) Oral at bedtime PRN Insomnia  ondansetron Injectable 4 milliGRAM(s) IV Push every 8 hours PRN Nausea and/or Vomiting      Vital Signs Last 24 Hrs  T(C): 37.2 (27 Nov 2021 19:55), Max: 37.6 (27 Nov 2021 13:50)  T(F): 99 (27 Nov 2021 19:55), Max: 99.6 (27 Nov 2021 13:50)  HR: 98 (27 Nov 2021 19:55) (68 - 98)  BP: 100/61 (27 Nov 2021 19:55) (84/55 - 128/79)  BP(mean): 71 (27 Nov 2021 13:50) (60 - 76)  RR: 18 (27 Nov 2021 19:55) (16 - 20)  SpO2: 93% (27 Nov 2021 19:55) (92% - 100%)    Constitutional: patient resting comfortably in bed, in no acute distress  Respiratory: respirations are unlabored, no accessory muscle use, no conversational dyspnea  Cardiovascular: regular rate & rhythm  Gastrointestinal: Abdomen soft, non-tender, non-distended, no rebound tenderness / guarding, previous surgical scar healed   Neurological: Non verbal ; no gross sensory / motor / coordination deficits  Psychiatric: Normal mood, normal affect  Musculoskeletal: No joint pain, swelling or deformity; no limitation of movement      I&O's Detail    27 Nov 2021 07:01  -  28 Nov 2021 04:17  --------------------------------------------------------  IN:    IV PiggyBack: 100 mL  Total IN: 100 mL    OUT:    Oral Fluid: 0 mL    Voided (mL): 700 mL  Total OUT: 700 mL    Total NET: -600 mL          LABS:    11-27    140  |  100  |  19.4  ----------------------------<  115<H>  3.0<L>   |  31.0<H>  |  0.45<L>    Ca    8.7      27 Nov 2021 08:47  Phos  2.7     11-27  Mg     2.2     11-27    TPro  5.7<L>  /  Alb  3.5  /  TBili  0.3<L>  /  DBili  x   /  AST  69<H>  /  ALT  74<H>  /  AlkPhos  111  11-27    PT/INR - ( 27 Nov 2021 08:47 )   PT: 13.2 sec;   INR: 1.15 ratio         PTT - ( 27 Nov 2021 08:47 )  PTT:26.2 sec     HPI/OVERNIGHT EVENTS:  POD 1 s/p sigmoid decompression via endoscopy with LITZY LOPEZ. Abdomen soft, rectal tube in place. Patient unable to provide subjective information.     MEDICATIONS  (STANDING):  cholecalciferol 2000 Unit(s) Oral daily  escitalopram 5 milliGRAM(s) Oral daily  fluticasone propionate 50 MICROgram(s)/spray Nasal Spray 1 Spray(s) Both Nostrils every 12 hours  heparin   Injectable 5000 Unit(s) SubCutaneous every 12 hours  lactobacillus acidophilus 1 Tablet(s) Oral daily  levothyroxine 25 MICROGram(s) Oral daily  loratadine 10 milliGRAM(s) Oral daily  mesalamine DR Capsule 800 milliGRAM(s) Oral every 12 hours  mesalamine ER Capsule 375 milliGRAM(s) Oral daily  polyethylene glycol 3350 17 Gram(s) Oral daily  torsemide 20 milliGRAM(s) Oral daily    MEDICATIONS  (PRN):  acetaminophen     Tablet .. 650 milliGRAM(s) Oral every 6 hours PRN Temp greater or equal to 38C (100.4F), Mild Pain (1 - 3)  ALBUTerol    90 MICROgram(s) HFA Inhaler 2 Puff(s) Inhalation every 6 hours PRN Shortness of Breath and/or Wheezing  aluminum hydroxide/magnesium hydroxide/simethicone Suspension 30 milliLiter(s) Oral every 4 hours PRN Dyspepsia  melatonin 3 milliGRAM(s) Oral at bedtime PRN Insomnia  ondansetron Injectable 4 milliGRAM(s) IV Push every 8 hours PRN Nausea and/or Vomiting      Vital Signs Last 24 Hrs  T(C): 37.2 (27 Nov 2021 19:55), Max: 37.6 (27 Nov 2021 13:50)  T(F): 99 (27 Nov 2021 19:55), Max: 99.6 (27 Nov 2021 13:50)  HR: 98 (27 Nov 2021 19:55) (68 - 98)  BP: 100/61 (27 Nov 2021 19:55) (84/55 - 128/79)  BP(mean): 71 (27 Nov 2021 13:50) (60 - 76)  RR: 18 (27 Nov 2021 19:55) (16 - 20)  SpO2: 93% (27 Nov 2021 19:55) (92% - 100%)    Constitutional: patient resting comfortably in bed, in no acute distress  Respiratory: respirations are unlabored, no accessory muscle use, no conversational dyspnea  Cardiovascular: regular rate & rhythm  Gastrointestinal: Abdomen soft, non-tender, non-distended, no rebound tenderness / guarding, previous surgical scar healed   Neurological: Non verbal ; no gross sensory / motor / coordination deficits  Psychiatric: Normal mood, normal affect  Musculoskeletal: Contracted       I&O's Detail    27 Nov 2021 07:01  -  28 Nov 2021 04:17  --------------------------------------------------------  IN:    IV PiggyBack: 100 mL  Total IN: 100 mL    OUT:    Oral Fluid: 0 mL    Voided (mL): 700 mL  Total OUT: 700 mL    Total NET: -600 mL          LABS:    11-27    140  |  100  |  19.4  ----------------------------<  115<H>  3.0<L>   |  31.0<H>  |  0.45<L>    Ca    8.7      27 Nov 2021 08:47  Phos  2.7     11-27  Mg     2.2     11-27    TPro  5.7<L>  /  Alb  3.5  /  TBili  0.3<L>  /  DBili  x   /  AST  69<H>  /  ALT  74<H>  /  AlkPhos  111  11-27    PT/INR - ( 27 Nov 2021 08:47 )   PT: 13.2 sec;   INR: 1.15 ratio         PTT - ( 27 Nov 2021 08:47 )  PTT:26.2 sec

## 2021-11-28 NOTE — PROGRESS NOTE ADULT - ASSESSMENT
61 year old female with PMH of autism, cataracts, constipation, nonverbal at baseline with mental retardation, osteoporosis, hypothyroidism coming to hospital [place] [facility] [place]. patient unable to provided history given non verbal w/ mental retardation. seen by surgery and GI for flex sigmoidoscopy today via GI for reduction of volvulus. at baseline patient tolerating puree with nectar thick diet. per chart patient was [place] [facility] [place] abdominal distention with agitation and respiratory depression after dinner.     # Sigmoid volvulus  - s/p status post colonoscopic decompression and rectal tube placement 11/27  - Planned sigmoidectomy by CRS in AM  - NPOpMN, IVF, Hold Heparin  - Cont Rectal tube    Gentle IVF only as BNP already elevated and she will be off diuretics preoperatively  ECHO grd 1 diastolic dysfxn    # Hypokalemia  - repleted  - since on bowel prep now, will give additional K now    # Intellectual disability, Non verbal at baseline, Autism,   - at baseline  - supportive care    # IBS  - mesalamine    # depression   - escitalopram    # Hypothyroidism  - levothyroxine      #dvt prophylaxis  - heparin SC     patient rodriguez Howe on phone [phone] is the [facility]    patient group [facility] [phone]

## 2021-11-28 NOTE — CHART NOTE - NSCHARTNOTEFT_GEN_A_CORE
Called by RN due to patient pulling rectal tube, currently with bowel functio (poop) belly is soft and does not appear to be in distress, taking meds for bowel prep, will continue to perform serial exams

## 2021-11-28 NOTE — PROGRESS NOTE ADULT - SUBJECTIVE AND OBJECTIVE BOX
CHIEF COMPLAINT:Patient is a 61y old  Female who presents with a chief complaint of Volvulus (28 Nov 2021 10:05)    INTERVAL HISTORY:  Pt was seen and examined  Pt had  a TTE which I reviewed as well  LVEf is preserved.     Allergies  No Known Allergies  	  MEDICATIONS:  torsemide 20 milliGRAM(s) Oral daily  metroNIDAZOLE    Tablet 1000 milliGRAM(s) Oral <User Schedule>  neomycin 1000 milliGRAM(s) Oral <User Schedule>  ALBUTerol    90 MICROgram(s) HFA Inhaler 2 Puff(s) Inhalation every 6 hours PRN  loratadine 10 milliGRAM(s) Oral daily  acetaminophen     Tablet .. 650 milliGRAM(s) Oral every 6 hours PRN  escitalopram 5 milliGRAM(s) Oral daily  melatonin 3 milliGRAM(s) Oral at bedtime PRN  ondansetron Injectable 4 milliGRAM(s) IV Push every 8 hours PRN  aluminum hydroxide/magnesium hydroxide/simethicone Suspension 30 milliLiter(s) Oral every 4 hours PRN  mesalamine DR Capsule 800 milliGRAM(s) Oral every 12 hours  mesalamine ER Capsule 375 milliGRAM(s) Oral daily  polyethylene glycol 3350 17 Gram(s) Oral daily  polyethylene glycol/electrolyte Solution 2000 milliLiter(s) Oral once  levothyroxine 25 MICROGram(s) Oral daily  cholecalciferol 2000 Unit(s) Oral daily  fluticasone propionate 50 MICROgram(s)/spray Nasal Spray 1 Spray(s) Both Nostrils every 12 hours  heparin   Injectable 5000 Unit(s) SubCutaneous every 12 hours    PHYSICAL EXAM:  T(C): 37.4 (11-28-21 @ 09:30), Max: 37.4 (11-28-21 @ 09:30)  HR: 90 (11-28-21 @ 09:30) (88 - 98)  BP: 93/62 (11-28-21 @ 09:30) (93/62 - 104/66)  RR: 17 (11-28-21 @ 09:30) (17 - 18)  SpO2: 95% (11-28-21 @ 09:30) (93% - 95%)  I&O's Summary    27 Nov 2021 07:01  -  28 Nov 2021 07:00  --------------------------------------------------------  IN: 100 mL / OUT: 1200 mL / NET: -1100 mL    RRR  Poor air entry b/l  contracted                            11.6   15.83 )-----------( 276      ( 28 Nov 2021 06:18 )             35.3     11-28    145  |  107  |  22.1<H>  ----------------------------<  107<H>  2.6<LL>   |  27.0  |  0.58    Ca    8.5<L>      28 Nov 2021 06:18  Phos  2.7     11-27  Mg     2.2     11-27    TPro  5.7<L>  /  Alb  3.5  /  TBili  0.3<L>  /  DBili  x   /  AST  69<H>  /  ALT  74<H>  /  AlkPhos  111  11-27    proBNP: Serum Pro-Brain Natriuretic Peptide: 1011 pg/mL (11-27 @ 20:33)  TSH: Thyroid Stimulating Hormone, Serum: 2.10 uIU/mL (11-28 @ 06:18)    TTE:   Summary:   1. Technically difficult study.   2. Normal left ventricular internal cavity size.   3. Normal global left ventricular systolic function.   4. Left ventricular ejection fraction, by visual estimation, is 55 to 60%.   5. Spectral Doppler shows impaired relaxation pattern of left ventricular myocardial filling (Grade I diastolic dysfunction).   6. Normal left atrial size.   7. Normal right atrial size.   8. Normal right ventricular size and function.   9. Sclerotic aortic valve with normal opening.  10. Moderate thickening of the anterior and posterior mitral valve leaflets.  11. Moderate mitral valve regurgitation.  12. Moderate tricuspid regurgitation.  13. Mild pulmonic valve regurgitation.  14. Trivial pericardial effusion.    Joy Kathleen MD Electronically signed on 11/28/2021 at 12:05:18 PM    ASSESSMENT/PLAN: 	  Spoke with pt's LIMA, mother Carley  She lives in [place].  Pt is not in heart failure on exam with normal LV function.  She is stable to proceed with surgery. No further testing is advised at this time.   Hold off on diuretics in the perioperative time  DVT prophylaxis as per primary team.  Please call if needed.

## 2021-11-28 NOTE — PROGRESS NOTE ADULT - ASSESSMENT
Patient with autism, intellectual disability, now with sigmoid volvulus status post colonoscopic decompression and rectal tube placement. Abdominal exam is fine. Surgical team is on board for possible surgical intervention. Correct lytes. Serial abdominal exams. No further GI intervention.

## 2021-11-28 NOTE — CHART NOTE - NSCHARTNOTEFT_GEN_A_CORE
Spoke with Mother of patient: Dexter on the Phone: number: [phone]. updated on plan and explained plan for sigmoidectomy, she asked pertinent questions and agreed

## 2021-11-28 NOTE — PROGRESS NOTE ADULT - ASSESSMENT
ASSESSMENT: Patient is a 61y old female with severe developmental delay, nonverbal at baseline transferred for sigmoid volvulus s/p sigmoid decompression. Due to likelihood of recurrence, surgical intervention recommended. Plan for lap sigmoidectomy, possible open, possible colostomy  on Monday 11/30. Discussed with mother.     PLAN:    - OR on Monday 11/30 with Dr. Montague   - Pre-Op on 11/29         NPO@MN w/IVF   - Meds/CARDS clearance, pending   - Monitor lytes, replete PRN   - Rectal tube, monitor   - GI following   - Remainder of care per primary team      Plan discussed with Attending, Dr. De La Rosa  ASSESSMENT: Patient is a 61y old female with severe developmental delay, nonverbal at baseline transferred for sigmoid volvulus s/p sigmoid decompression. Due to likelihood of recurrence, surgical intervention recommended. Plan for lap sigmoidectomy, possible open, possible colostomy  on Monday 11/29. Discussed with mother.     PLAN:    - OR on Monday 11/29 with Dr. Montague   - Pre-Op on 11/28        NPO@MN w/IVF         Bowel prep   - Meds/CARDS clearance, pending   - Monitor lytes, replete PRN   - Rectal tube, monitor   - GI following   - Remainder of care per primary team      Plan discussed with Attending, Dr. De La Rosa  ASSESSMENT: Patient is a 61y old female with severe developmental delay, nonverbal at baseline transferred for sigmoid volvulus s/p sigmoid decompression. Due to likelihood of recurrence, surgical intervention recommended. Plan for lap sigmoidectomy, possible open, possible colostomy  on Monday 11/29. Discussed with mother.     PLAN:    - OR on Monday 11/29 with Dr. Montague   - Pre-Op on 11/28        NPO@MN w/IVF         Bowel prep   - Med clearance   - CARDS clearance         TTE, no stress test due to contractures   - Monitor lytes, replete PRN   - Rectal tube, monitor   - GI following   - Remainder of care per primary team      Plan discussed with Attending, Dr. De La Rosa

## 2021-11-28 NOTE — PROGRESS NOTE ADULT - SUBJECTIVE AND OBJECTIVE BOX
INTERVAL HPI/OVERNIGHT EVENTS: Patient seen and examined. Patient has rectal tube in place. She cannot give any history. Overnight the abdomen was fine.    MEDICATIONS  (STANDING):  cholecalciferol 2000 Unit(s) Oral daily  escitalopram 5 milliGRAM(s) Oral daily  fluticasone propionate 50 MICROgram(s)/spray Nasal Spray 1 Spray(s) Both Nostrils every 12 hours  heparin   Injectable 5000 Unit(s) SubCutaneous every 12 hours  lactobacillus acidophilus 1 Tablet(s) Oral daily  levothyroxine 25 MICROGram(s) Oral daily  loratadine 10 milliGRAM(s) Oral daily  mesalamine DR Capsule 800 milliGRAM(s) Oral every 12 hours  mesalamine ER Capsule 375 milliGRAM(s) Oral daily  polyethylene glycol 3350 17 Gram(s) Oral daily  potassium chloride  10 mEq/100 mL IVPB 10 milliEquivalent(s) IV Intermittent every 1 hour  torsemide 20 milliGRAM(s) Oral daily    MEDICATIONS  (PRN):  acetaminophen     Tablet .. 650 milliGRAM(s) Oral every 6 hours PRN Temp greater or equal to 38C (100.4F), Mild Pain (1 - 3)  ALBUTerol    90 MICROgram(s) HFA Inhaler 2 Puff(s) Inhalation every 6 hours PRN Shortness of Breath and/or Wheezing  aluminum hydroxide/magnesium hydroxide/simethicone Suspension 30 milliLiter(s) Oral every 4 hours PRN Dyspepsia  melatonin 3 milliGRAM(s) Oral at bedtime PRN Insomnia  ondansetron Injectable 4 milliGRAM(s) IV Push every 8 hours PRN Nausea and/or Vomiting      Allergies    No Known Allergies    Intolerances        Vital Signs Last 24 Hrs  T(C): 36.8 (28 Nov 2021 04:00), Max: 37.6 (27 Nov 2021 13:50)  T(F): 98.3 (28 Nov 2021 04:00), Max: 99.6 (27 Nov 2021 13:50)  HR: 88 (28 Nov 2021 04:00) (68 - 98)  BP: 94/56 (28 Nov 2021 04:00) (84/55 - 104/66)  BP(mean): 71 (27 Nov 2021 13:50) (60 - 76)  RR: 18 (28 Nov 2021 04:00) (16 - 20)  SpO2: 93% (28 Nov 2021 04:00) (92% - 100%)    LABS:                        11.6   15.83 )-----------( 276      ( 28 Nov 2021 06:18 )             35.3     11-28    145  |  107  |  22.1<H>  ----------------------------<  107<H>  2.6<LL>   |  27.0  |  0.58    Ca    8.5<L>      28 Nov 2021 06:18  Phos  2.7     11-27  Mg     2.2     11-27    TPro  5.7<L>  /  Alb  3.5  /  TBili  0.3<L>  /  DBili  x   /  AST  69<H>  /  ALT  74<H>  /  AlkPhos  111  11-27    PT/INR - ( 27 Nov 2021 08:47 )   PT: 13.2 sec;   INR: 1.15 ratio         PTT - ( 27 Nov 2021 08:47 )  PTT:26.2 sec      RADIOLOGY & ADDITIONAL TESTS:

## 2021-11-29 ENCOUNTER — RESULT REVIEW (OUTPATIENT)
Age: 61
End: 2021-11-29

## 2021-11-29 DIAGNOSIS — K56.2 VOLVULUS: ICD-10-CM

## 2021-11-29 LAB
ANION GAP SERPL CALC-SCNC: 12 MMOL/L — SIGNIFICANT CHANGE UP (ref 5–17)
ANION GAP SERPL CALC-SCNC: 12 MMOL/L — SIGNIFICANT CHANGE UP (ref 5–17)
APTT BLD: 25.9 SEC — LOW (ref 27.5–35.5)
BASE EXCESS BLDV CALC-SCNC: 0.3 MMOL/L — SIGNIFICANT CHANGE UP (ref -2–3)
BASOPHILS # BLD AUTO: 0.02 K/UL — SIGNIFICANT CHANGE UP (ref 0–0.2)
BASOPHILS NFR BLD AUTO: 0.2 % — SIGNIFICANT CHANGE UP (ref 0–2)
BUN SERPL-MCNC: 25.4 MG/DL — HIGH (ref 8–20)
BUN SERPL-MCNC: 27.6 MG/DL — HIGH (ref 8–20)
CA-I SERPL-SCNC: 1.25 MMOL/L — SIGNIFICANT CHANGE UP (ref 1.15–1.33)
CALCIUM SERPL-MCNC: 7.6 MG/DL — LOW (ref 8.6–10.2)
CALCIUM SERPL-MCNC: 8.5 MG/DL — LOW (ref 8.6–10.2)
CHLORIDE BLDV-SCNC: 115 MMOL/L — HIGH (ref 98–107)
CHLORIDE SERPL-SCNC: 111 MMOL/L — HIGH (ref 98–107)
CHLORIDE SERPL-SCNC: 114 MMOL/L — HIGH (ref 98–107)
CO2 SERPL-SCNC: 20 MMOL/L — LOW (ref 22–29)
CO2 SERPL-SCNC: 24 MMOL/L — SIGNIFICANT CHANGE UP (ref 22–29)
CREAT SERPL-MCNC: 0.57 MG/DL — SIGNIFICANT CHANGE UP (ref 0.5–1.3)
CREAT SERPL-MCNC: 0.59 MG/DL — SIGNIFICANT CHANGE UP (ref 0.5–1.3)
EOSINOPHIL # BLD AUTO: 0 K/UL — SIGNIFICANT CHANGE UP (ref 0–0.5)
EOSINOPHIL NFR BLD AUTO: 0 % — SIGNIFICANT CHANGE UP (ref 0–6)
GAS PNL BLDA: SIGNIFICANT CHANGE UP
GAS PNL BLDV: 144 MMOL/L — SIGNIFICANT CHANGE UP (ref 136–145)
GAS PNL BLDV: SIGNIFICANT CHANGE UP
GAS PNL BLDV: SIGNIFICANT CHANGE UP
GLUCOSE BLDC GLUCOMTR-MCNC: 115 MG/DL — HIGH (ref 70–99)
GLUCOSE BLDC GLUCOMTR-MCNC: 80 MG/DL — SIGNIFICANT CHANGE UP (ref 70–99)
GLUCOSE BLDC GLUCOMTR-MCNC: 93 MG/DL — SIGNIFICANT CHANGE UP (ref 70–99)
GLUCOSE BLDC GLUCOMTR-MCNC: 97 MG/DL — SIGNIFICANT CHANGE UP (ref 70–99)
GLUCOSE BLDV-MCNC: 102 MG/DL — HIGH (ref 70–99)
GLUCOSE SERPL-MCNC: 103 MG/DL — HIGH (ref 70–99)
GLUCOSE SERPL-MCNC: 119 MG/DL — HIGH (ref 70–99)
HCO3 BLDV-SCNC: 24 MMOL/L — SIGNIFICANT CHANGE UP (ref 22–29)
HCT VFR BLD CALC: 35 % — SIGNIFICANT CHANGE UP (ref 34.5–45)
HCT VFR BLDA CALC: 32 % — LOW (ref 34–46)
HGB BLD CALC-MCNC: 10.6 G/DL — LOW (ref 11.7–16.1)
HGB BLD-MCNC: 11.7 G/DL — SIGNIFICANT CHANGE UP (ref 11.5–15.5)
IMM GRANULOCYTES NFR BLD AUTO: 0.5 % — SIGNIFICANT CHANGE UP (ref 0–1.5)
INR BLD: 1.25 RATIO — HIGH (ref 0.88–1.16)
LACTATE BLDV-MCNC: 1.1 MMOL/L — SIGNIFICANT CHANGE UP (ref 0.5–2)
LYMPHOCYTES # BLD AUTO: 1.12 K/UL — SIGNIFICANT CHANGE UP (ref 1–3.3)
LYMPHOCYTES # BLD AUTO: 8.6 % — LOW (ref 13–44)
MAGNESIUM SERPL-MCNC: 1.9 MG/DL — SIGNIFICANT CHANGE UP (ref 1.6–2.6)
MAGNESIUM SERPL-MCNC: 2 MG/DL — SIGNIFICANT CHANGE UP (ref 1.6–2.6)
MCHC RBC-ENTMCNC: 29 PG — SIGNIFICANT CHANGE UP (ref 27–34)
MCHC RBC-ENTMCNC: 33.4 GM/DL — SIGNIFICANT CHANGE UP (ref 32–36)
MCV RBC AUTO: 86.8 FL — SIGNIFICANT CHANGE UP (ref 80–100)
MONOCYTES # BLD AUTO: 1.39 K/UL — HIGH (ref 0–0.9)
MONOCYTES NFR BLD AUTO: 10.7 % — SIGNIFICANT CHANGE UP (ref 2–14)
NEUTROPHILS # BLD AUTO: 10.45 K/UL — HIGH (ref 1.8–7.4)
NEUTROPHILS NFR BLD AUTO: 80 % — HIGH (ref 43–77)
PCO2 BLDV: 35 MMHG — LOW (ref 39–42)
PH BLDV: 7.45 — HIGH (ref 7.32–7.43)
PHOSPHATE SERPL-MCNC: 2.5 MG/DL — SIGNIFICANT CHANGE UP (ref 2.4–4.7)
PLATELET # BLD AUTO: 259 K/UL — SIGNIFICANT CHANGE UP (ref 150–400)
PO2 BLDV: 248 MMHG — HIGH (ref 25–45)
POTASSIUM BLDV-SCNC: 2.6 MMOL/L — CRITICAL LOW (ref 3.5–5.1)
POTASSIUM SERPL-MCNC: 2.5 MMOL/L — CRITICAL LOW (ref 3.5–5.3)
POTASSIUM SERPL-MCNC: 3 MMOL/L — LOW (ref 3.5–5.3)
POTASSIUM SERPL-SCNC: 2.5 MMOL/L — CRITICAL LOW (ref 3.5–5.3)
POTASSIUM SERPL-SCNC: 3 MMOL/L — LOW (ref 3.5–5.3)
PROTHROM AB SERPL-ACNC: 14.3 SEC — HIGH (ref 10.6–13.6)
RBC # BLD: 4.03 M/UL — SIGNIFICANT CHANGE UP (ref 3.8–5.2)
RBC # FLD: 13.5 % — SIGNIFICANT CHANGE UP (ref 10.3–14.5)
SAO2 % BLDV: 100 % — SIGNIFICANT CHANGE UP
SODIUM SERPL-SCNC: 146 MMOL/L — HIGH (ref 135–145)
SODIUM SERPL-SCNC: 147 MMOL/L — HIGH (ref 135–145)
WBC # BLD: 13.04 K/UL — HIGH (ref 3.8–10.5)
WBC # FLD AUTO: 13.04 K/UL — HIGH (ref 3.8–10.5)

## 2021-11-29 PROCEDURE — 99232 SBSQ HOSP IP/OBS MODERATE 35: CPT | Mod: 57,25

## 2021-11-29 PROCEDURE — 99233 SBSQ HOSP IP/OBS HIGH 50: CPT

## 2021-11-29 PROCEDURE — 88307 TISSUE EXAM BY PATHOLOGIST: CPT | Mod: 26

## 2021-11-29 PROCEDURE — 44143 PARTIAL REMOVAL OF COLON: CPT

## 2021-11-29 RX ORDER — ENOXAPARIN SODIUM 100 MG/ML
40 INJECTION SUBCUTANEOUS DAILY
Refills: 0 | Status: DISCONTINUED | OUTPATIENT
Start: 2021-11-30 | End: 2021-12-20

## 2021-11-29 RX ORDER — DEXTROSE 50 % IN WATER 50 %
25 SYRINGE (ML) INTRAVENOUS ONCE
Refills: 0 | Status: DISCONTINUED | OUTPATIENT
Start: 2021-11-29 | End: 2021-12-16

## 2021-11-29 RX ORDER — SODIUM CHLORIDE 9 MG/ML
1000 INJECTION, SOLUTION INTRAVENOUS
Refills: 0 | Status: DISCONTINUED | OUTPATIENT
Start: 2021-11-29 | End: 2021-11-29

## 2021-11-29 RX ORDER — NEOMYCIN SULFATE 500 MG/1
1000 TABLET ORAL ONCE
Refills: 0 | Status: COMPLETED | OUTPATIENT
Start: 2021-11-29 | End: 2021-11-29

## 2021-11-29 RX ORDER — HEPARIN SODIUM 5000 [USP'U]/ML
5000 INJECTION INTRAVENOUS; SUBCUTANEOUS ONCE
Refills: 0 | Status: COMPLETED | OUTPATIENT
Start: 2021-11-29 | End: 2021-11-29

## 2021-11-29 RX ORDER — DEXTROSE 50 % IN WATER 50 %
12.5 SYRINGE (ML) INTRAVENOUS ONCE
Refills: 0 | Status: DISCONTINUED | OUTPATIENT
Start: 2021-11-29 | End: 2021-12-16

## 2021-11-29 RX ORDER — ESCITALOPRAM OXALATE 10 MG/1
5 TABLET, FILM COATED ORAL DAILY
Refills: 0 | Status: DISCONTINUED | OUTPATIENT
Start: 2021-11-29 | End: 2021-12-20

## 2021-11-29 RX ORDER — LANOLIN ALCOHOL/MO/W.PET/CERES
3 CREAM (GRAM) TOPICAL AT BEDTIME
Refills: 0 | Status: DISCONTINUED | OUTPATIENT
Start: 2021-11-29 | End: 2021-12-08

## 2021-11-29 RX ORDER — LACTOBACILLUS ACIDOPHILUS 100MM CELL
1 CAPSULE ORAL DAILY
Refills: 0 | Status: DISCONTINUED | OUTPATIENT
Start: 2021-11-29 | End: 2021-12-20

## 2021-11-29 RX ORDER — METRONIDAZOLE 500 MG
1000 TABLET ORAL ONCE
Refills: 0 | Status: COMPLETED | OUTPATIENT
Start: 2021-11-29 | End: 2021-11-29

## 2021-11-29 RX ORDER — ONDANSETRON 8 MG/1
4 TABLET, FILM COATED ORAL ONCE
Refills: 0 | Status: DISCONTINUED | OUTPATIENT
Start: 2021-11-29 | End: 2021-11-29

## 2021-11-29 RX ORDER — BUPIVACAINE 13.3 MG/ML
20 INJECTION, SUSPENSION, LIPOSOMAL INFILTRATION ONCE
Refills: 0 | Status: DISCONTINUED | OUTPATIENT
Start: 2021-11-29 | End: 2021-12-01

## 2021-11-29 RX ORDER — SODIUM CHLORIDE 9 MG/ML
1000 INJECTION, SOLUTION INTRAVENOUS
Refills: 0 | Status: DISCONTINUED | OUTPATIENT
Start: 2021-11-29 | End: 2021-12-16

## 2021-11-29 RX ORDER — MESALAMINE 400 MG
800 TABLET, DELAYED RELEASE (ENTERIC COATED) ORAL EVERY 12 HOURS
Refills: 0 | Status: DISCONTINUED | OUTPATIENT
Start: 2021-11-29 | End: 2021-12-20

## 2021-11-29 RX ORDER — CEFOTETAN DISODIUM 1 G
2 VIAL (EA) INJECTION ONCE
Refills: 0 | Status: COMPLETED | OUTPATIENT
Start: 2021-11-29 | End: 2021-11-29

## 2021-11-29 RX ORDER — KETOROLAC TROMETHAMINE 30 MG/ML
15 SYRINGE (ML) INJECTION EVERY 6 HOURS
Refills: 0 | Status: DISCONTINUED | OUTPATIENT
Start: 2021-11-29 | End: 2021-12-02

## 2021-11-29 RX ORDER — ACETAMINOPHEN 500 MG
1000 TABLET ORAL ONCE
Refills: 0 | Status: COMPLETED | OUTPATIENT
Start: 2021-11-29 | End: 2021-11-29

## 2021-11-29 RX ORDER — POTASSIUM CHLORIDE 20 MEQ
10 PACKET (EA) ORAL
Refills: 0 | Status: DISCONTINUED | OUTPATIENT
Start: 2021-11-29 | End: 2021-11-29

## 2021-11-29 RX ORDER — GLUCAGON INJECTION, SOLUTION 0.5 MG/.1ML
1 INJECTION, SOLUTION SUBCUTANEOUS ONCE
Refills: 0 | Status: DISCONTINUED | OUTPATIENT
Start: 2021-11-29 | End: 2021-12-16

## 2021-11-29 RX ORDER — FLUTICASONE PROPIONATE 50 MCG
1 SPRAY, SUSPENSION NASAL EVERY 12 HOURS
Refills: 0 | Status: DISCONTINUED | OUTPATIENT
Start: 2021-11-29 | End: 2021-12-20

## 2021-11-29 RX ORDER — NEOMYCIN SULFATE 500 MG/1
1000 TABLET ORAL ONCE
Refills: 0 | Status: DISCONTINUED | OUTPATIENT
Start: 2021-11-29 | End: 2021-11-29

## 2021-11-29 RX ORDER — LEVOTHYROXINE SODIUM 125 MCG
12.5 TABLET ORAL
Refills: 0 | Status: DISCONTINUED | OUTPATIENT
Start: 2021-11-29 | End: 2021-11-29

## 2021-11-29 RX ORDER — POTASSIUM PHOSPHATE, MONOBASIC POTASSIUM PHOSPHATE, DIBASIC 236; 224 MG/ML; MG/ML
15 INJECTION, SOLUTION INTRAVENOUS ONCE
Refills: 0 | Status: COMPLETED | OUTPATIENT
Start: 2021-11-29 | End: 2021-11-30

## 2021-11-29 RX ORDER — POTASSIUM CHLORIDE 20 MEQ
10 PACKET (EA) ORAL
Refills: 0 | Status: DISCONTINUED | OUTPATIENT
Start: 2021-11-29 | End: 2021-12-08

## 2021-11-29 RX ORDER — MESALAMINE 400 MG
375 TABLET, DELAYED RELEASE (ENTERIC COATED) ORAL DAILY
Refills: 0 | Status: DISCONTINUED | OUTPATIENT
Start: 2021-11-29 | End: 2021-12-06

## 2021-11-29 RX ORDER — POTASSIUM CHLORIDE 20 MEQ
10 PACKET (EA) ORAL
Refills: 0 | Status: COMPLETED | OUTPATIENT
Start: 2021-11-29 | End: 2021-11-29

## 2021-11-29 RX ORDER — ALBUTEROL 90 UG/1
2 AEROSOL, METERED ORAL EVERY 6 HOURS
Refills: 0 | Status: DISCONTINUED | OUTPATIENT
Start: 2021-11-29 | End: 2021-12-20

## 2021-11-29 RX ORDER — METRONIDAZOLE 500 MG
1000 TABLET ORAL ONCE
Refills: 0 | Status: DISCONTINUED | OUTPATIENT
Start: 2021-11-29 | End: 2021-11-29

## 2021-11-29 RX ORDER — ONDANSETRON 8 MG/1
4 TABLET, FILM COATED ORAL EVERY 4 HOURS
Refills: 0 | Status: DISCONTINUED | OUTPATIENT
Start: 2021-11-29 | End: 2021-11-29

## 2021-11-29 RX ORDER — CHOLECALCIFEROL (VITAMIN D3) 125 MCG
2000 CAPSULE ORAL DAILY
Refills: 0 | Status: DISCONTINUED | OUTPATIENT
Start: 2021-11-29 | End: 2021-12-20

## 2021-11-29 RX ORDER — POTASSIUM CHLORIDE 20 MEQ
40 PACKET (EA) ORAL EVERY 4 HOURS
Refills: 0 | Status: DISCONTINUED | OUTPATIENT
Start: 2021-11-29 | End: 2021-11-29

## 2021-11-29 RX ORDER — POLYETHYLENE GLYCOL 3350 17 G/17G
17 POWDER, FOR SOLUTION ORAL DAILY
Refills: 0 | Status: DISCONTINUED | OUTPATIENT
Start: 2021-11-29 | End: 2021-12-20

## 2021-11-29 RX ORDER — FENTANYL CITRATE 50 UG/ML
25 INJECTION INTRAVENOUS
Refills: 0 | Status: DISCONTINUED | OUTPATIENT
Start: 2021-11-29 | End: 2021-11-29

## 2021-11-29 RX ORDER — DEXTROSE 50 % IN WATER 50 %
15 SYRINGE (ML) INTRAVENOUS ONCE
Refills: 0 | Status: DISCONTINUED | OUTPATIENT
Start: 2021-11-29 | End: 2021-12-16

## 2021-11-29 RX ORDER — SODIUM CHLORIDE 9 MG/ML
1000 INJECTION, SOLUTION INTRAVENOUS
Refills: 0 | Status: DISCONTINUED | OUTPATIENT
Start: 2021-11-29 | End: 2021-11-30

## 2021-11-29 RX ORDER — LORATADINE 10 MG/1
10 TABLET ORAL DAILY
Refills: 0 | Status: DISCONTINUED | OUTPATIENT
Start: 2021-11-29 | End: 2021-12-20

## 2021-11-29 RX ORDER — LEVOTHYROXINE SODIUM 125 MCG
12.5 TABLET ORAL
Refills: 0 | Status: DISCONTINUED | OUTPATIENT
Start: 2021-11-29 | End: 2021-12-20

## 2021-11-29 RX ORDER — INSULIN LISPRO 100/ML
VIAL (ML) SUBCUTANEOUS
Refills: 0 | Status: DISCONTINUED | OUTPATIENT
Start: 2021-11-29 | End: 2021-11-30

## 2021-11-29 RX ADMIN — Medication 100 MILLIEQUIVALENT(S): at 09:08

## 2021-11-29 RX ADMIN — SODIUM CHLORIDE 60 MILLILITER(S): 9 INJECTION, SOLUTION INTRAVENOUS at 09:08

## 2021-11-29 RX ADMIN — FENTANYL CITRATE 25 MICROGRAM(S): 50 INJECTION INTRAVENOUS at 16:36

## 2021-11-29 RX ADMIN — Medication 12.5 MICROGRAM(S): at 09:08

## 2021-11-29 RX ADMIN — Medication 100 MILLIEQUIVALENT(S): at 10:23

## 2021-11-29 RX ADMIN — Medication 1000 MILLIGRAM(S): at 22:53

## 2021-11-29 RX ADMIN — Medication 1 SPRAY(S): at 05:04

## 2021-11-29 RX ADMIN — Medication 100 MILLIEQUIVALENT(S): at 18:58

## 2021-11-29 RX ADMIN — Medication 1000 MILLIGRAM(S): at 16:09

## 2021-11-29 RX ADMIN — HEPARIN SODIUM 5000 UNIT(S): 5000 INJECTION INTRAVENOUS; SUBCUTANEOUS at 11:16

## 2021-11-29 RX ADMIN — Medication 15 MILLIGRAM(S): at 18:24

## 2021-11-29 RX ADMIN — NEOMYCIN SULFATE 1000 MILLIGRAM(S): 500 TABLET ORAL at 06:20

## 2021-11-29 RX ADMIN — Medication 1 SPRAY(S): at 17:53

## 2021-11-29 RX ADMIN — Medication 100 GRAM(S): at 11:44

## 2021-11-29 RX ADMIN — Medication 15 MILLIGRAM(S): at 17:54

## 2021-11-29 RX ADMIN — Medication 100 MILLIEQUIVALENT(S): at 07:55

## 2021-11-29 RX ADMIN — FENTANYL CITRATE 25 MICROGRAM(S): 50 INJECTION INTRAVENOUS at 16:06

## 2021-11-29 RX ADMIN — Medication 400 MILLIGRAM(S): at 22:38

## 2021-11-29 RX ADMIN — Medication 100 MILLIEQUIVALENT(S): at 22:38

## 2021-11-29 RX ADMIN — Medication 1000 MILLIGRAM(S): at 06:20

## 2021-11-29 RX ADMIN — Medication 400 MILLIGRAM(S): at 15:39

## 2021-11-29 NOTE — PROGRESS NOTE ADULT - ASSESSMENT
ASSESSMENT: Patient is a 61y old female with severe developmental delay, nonverbal at baseline transferred for sigmoid volvulus s/p sigmoid decompression. Due to likelihood of recurrence, surgical intervention recommended. Plan for lap sigmoidectomy, possible open, possible colostomy  on Monday 11/29. Discussed with mother.     PLAN:    - OR on Monday 11/29 with Dr. Montague   -NPO  -IVF  - undergoing bowel prep  - Med and cards cleared  - Monitor lytes, replete PRN

## 2021-11-29 NOTE — PROGRESS NOTE ADULT - SUBJECTIVE AND OBJECTIVE BOX
HPI/OVERNIGHT EVENTS: Patient seen and examined at bedside this AM. patient reomved rectral tube, however belly is soft, does not appear to be in distress, afebrile, VSS    Vital Signs Last 24 Hrs  T(C): 36.3 (28 Nov 2021 19:50), Max: 37.4 (28 Nov 2021 09:30)  T(F): 97.4 (28 Nov 2021 19:50), Max: 99.4 (28 Nov 2021 09:30)  HR: 100 (28 Nov 2021 19:50) (88 - 100)  BP: 108/68 (28 Nov 2021 19:50) (93/62 - 108/68)  BP(mean): --  RR: 18 (28 Nov 2021 19:50) (17 - 18)  SpO2: 96% (28 Nov 2021 19:50) (92% - 96%)    I&O's Detail    27 Nov 2021 07:01  -  28 Nov 2021 07:00  --------------------------------------------------------  IN:    IV PiggyBack: 100 mL  Total IN: 100 mL    OUT:    Oral Fluid: 0 mL    Voided (mL): 1200 mL  Total OUT: 1200 mL    Total NET: -1100 mL      28 Nov 2021 07:01  -  29 Nov 2021 00:37  --------------------------------------------------------  IN:    sodium chloride 0.9%: 300 mL  Total IN: 300 mL    OUT:  Total OUT: 0 mL    Total NET: 300 mL      Constitutional: patient resting comfortably in bed, in no acute distress  Respiratory: respirations are unlabored, no accessory muscle use, no conversational dyspnea  Cardiovascular: regular rate & rhythm  Gastrointestinal: Abdomen soft, non-tender, non-distended, no rebound tenderness / guarding, previous surgical scar healed   Neurological: Non verbal ; no gross sensory / motor / coordination deficits  Psychiatric: Normal mood, normal affect  Musculoskeletal: Contracted     LABS:                        11.6   15.83 )-----------( 276      ( 28 Nov 2021 06:18 )             35.3     11-28    145  |  107  |  22.1<H>  ----------------------------<  107<H>  2.6<LL>   |  27.0  |  0.58    Ca    8.5<L>      28 Nov 2021 06:18  Phos  2.7     11-27  Mg     2.2     11-27    TPro  5.7<L>  /  Alb  3.5  /  TBili  0.3<L>  /  DBili  x   /  AST  69<H>  /  ALT  74<H>  /  AlkPhos  111  11-27    PT/INR - ( 27 Nov 2021 08:47 )   PT: 13.2 sec;   INR: 1.15 ratio         PTT - ( 27 Nov 2021 08:47 )  PTT:26.2 sec      MEDICATIONS  (STANDING):  cholecalciferol 2000 Unit(s) Oral daily  escitalopram 5 milliGRAM(s) Oral daily  fluticasone propionate 50 MICROgram(s)/spray Nasal Spray 1 Spray(s) Both Nostrils every 12 hours  lactobacillus acidophilus 1 Tablet(s) Oral daily  levothyroxine 25 MICROGram(s) Oral daily  loratadine 10 milliGRAM(s) Oral daily  mesalamine DR Capsule 800 milliGRAM(s) Oral every 12 hours  mesalamine ER Capsule 375 milliGRAM(s) Oral daily  metroNIDAZOLE    Tablet 1000 milliGRAM(s) Oral <User Schedule>  neomycin 1000 milliGRAM(s) Oral <User Schedule>  polyethylene glycol 3350 17 Gram(s) Oral daily  sodium chloride 0.9%. 1000 milliLiter(s) (60 mL/Hr) IV Continuous <Continuous>  torsemide 20 milliGRAM(s) Oral daily    MEDICATIONS  (PRN):  acetaminophen     Tablet .. 650 milliGRAM(s) Oral every 6 hours PRN Temp greater or equal to 38C (100.4F), Mild Pain (1 - 3)  ALBUTerol    90 MICROgram(s) HFA Inhaler 2 Puff(s) Inhalation every 6 hours PRN Shortness of Breath and/or Wheezing  aluminum hydroxide/magnesium hydroxide/simethicone Suspension 30 milliLiter(s) Oral every 4 hours PRN Dyspepsia  melatonin 3 milliGRAM(s) Oral at bedtime PRN Insomnia  ondansetron Injectable 4 milliGRAM(s) IV Push every 8 hours PRN Nausea and/or Vomiting      MICRO:   Cultures     STUDIES:   EKG, CXR, U/S, CT, MRI

## 2021-11-29 NOTE — PROGRESS NOTE ADULT - SUBJECTIVE AND OBJECTIVE BOX
Patient is a 61y old  Female who presents with a chief complaint of Volvulus (29 Nov 2021 00:36)      HPI:  61 year old female with pmh of autism, cataracts, constipation, nonverbal at baseline with mental retardation, osteoporosis, hypothyroidism  found to have sigmoid volvulus. GERD: The patient is unable to give history.  Vitals were stable last night.  No fever.  No reports of nausea vomiting.      REVIEW OF SYSTEMS:  Unable to obtain           MEDICAL & SURGICAL HISTORY:  Mentally disabled    Autism    Depression, major    Hypersalivation    Constipation    Nonverbal    OP (osteoporosis)    Cataract    S/P laparotomy        FAMILY HISTORY:  FHx: blood disorder  abo incompatibility    Family history of retinitis pigmentosa        SOCIAL HISTORY:  Smoking Status: [ ] Current, [ ] Former, [ ] Never  Pack Years:  [  ] EtOH-no  [  ] IVDA    MEDICATIONS:  MEDICATIONS  (STANDING):  cholecalciferol 2000 Unit(s) Oral daily  escitalopram 5 milliGRAM(s) Oral daily  fluticasone propionate 50 MICROgram(s)/spray Nasal Spray 1 Spray(s) Both Nostrils every 12 hours  lactobacillus acidophilus 1 Tablet(s) Oral daily  levothyroxine Injectable 12.5 MICROGram(s) IV Push <User Schedule>  loratadine 10 milliGRAM(s) Oral daily  mesalamine DR Capsule 800 milliGRAM(s) Oral every 12 hours  mesalamine ER Capsule 375 milliGRAM(s) Oral daily  metroNIDAZOLE    Tablet 1000 milliGRAM(s) Oral <User Schedule>  neomycin 1000 milliGRAM(s) Oral <User Schedule>  polyethylene glycol 3350 17 Gram(s) Oral daily  potassium chloride  10 mEq/100 mL IVPB 10 milliEquivalent(s) IV Intermittent every 1 hour  potassium chloride  10 mEq/100 mL IVPB 10 milliEquivalent(s) IV Intermittent every 1 hour  sodium chloride 0.9% 1000 milliLiter(s) (60 mL/Hr) IV Continuous <Continuous>    MEDICATIONS  (PRN):  acetaminophen     Tablet .. 650 milliGRAM(s) Oral every 6 hours PRN Temp greater or equal to 38C (100.4F), Mild Pain (1 - 3)  ALBUTerol    90 MICROgram(s) HFA Inhaler 2 Puff(s) Inhalation every 6 hours PRN Shortness of Breath and/or Wheezing  aluminum hydroxide/magnesium hydroxide/simethicone Suspension 30 milliLiter(s) Oral every 4 hours PRN Dyspepsia  melatonin 3 milliGRAM(s) Oral at bedtime PRN Insomnia  ondansetron Injectable 4 milliGRAM(s) IV Push every 8 hours PRN Nausea and/or Vomiting      Allergies    No Known Allergies    Intolerances        Vital Signs Last 24 Hrs  T(C): 37.1 (29 Nov 2021 04:03), Max: 37.4 (28 Nov 2021 09:30)  T(F): 98.8 (29 Nov 2021 04:03), Max: 99.4 (28 Nov 2021 09:30)  HR: 80 (29 Nov 2021 04:03) (80 - 100)  BP: 98/62 (29 Nov 2021 04:03) (93/62 - 108/68)  BP(mean): --  RR: 18 (29 Nov 2021 04:03) (17 - 18)  SpO2: 93% (29 Nov 2021 04:03) (92% - 96%)    11-28 @ 07:01  -  11-29 @ 07:00  --------------------------------------------------------  IN: 720 mL / OUT: 0 mL / NET: 720 mL          PHYSICAL EXAM:    General: ; in no acute distress- non verbal   HEENT: MMM, conjunctiva and sclera clear  H-RRR  L-CTA  Gastrointestinal: Soft, non-tender non-distended; Normal bowel sounds; No rebound or guarding  Extremities: pt is contracted   Neurological: Alert and oriented xo  Skin: Warm and dry. No obvious rash      LABS:                        11.7   13.04 )-----------( 259      ( 29 Nov 2021 05:39 )             35.0     29 Nov 2021 05:39    147    |  111    |  27.6   ----------------------------<  103    2.5     |  24.0   |  0.59     Ca    8.5        29 Nov 2021 05:39  Mg     2.0       29 Nov 2021 05:39          Hepatitis C Virus S/CO Ratio: 0.09 S/CO (11-28-21 @ 10:54)  Hepatitis C Virus Interpretation: Nonreact (11-28-21 @ 10:54)        RADIOLOGY & ADDITIONAL STUDIES:

## 2021-11-29 NOTE — PROGRESS NOTE ADULT - PROBLEM SELECTOR PLAN 1
Decompressed endoscopically.  Rectal tube was initially placed however it has fallen out.  Patient is to go to the OR today. abdomen soft

## 2021-11-29 NOTE — PROGRESS NOTE ADULT - SUBJECTIVE AND OBJECTIVE BOX
HEALTH ISSUES - PROBLEM Dx:    sigmoid volvulus    INTERVAL HPI/ OVERNIGHT EVENTS:    comfortable lying in bed  did not complete moviprep  and now refusing anything PO    REVIEW OF SYSTEMS:    as above    Vital Signs Last 24 Hrs  T(C): 37.1 (29 Nov 2021 11:18), Max: 37.1 (29 Nov 2021 04:03)  T(F): 98.7 (29 Nov 2021 11:18), Max: 98.8 (29 Nov 2021 04:03)  HR: 78 (29 Nov 2021 11:18) (78 - 100)  BP: 106/74 (29 Nov 2021 11:18) (98/62 - 108/68)  BP(mean): --  RR: 20 (29 Nov 2021 11:18) (18 - 20)  SpO2: 97% (29 Nov 2021 11:18) (92% - 97%)    PHYSICAL EXAM-  GENERAL: comfortable, asleep  HEAD:  Atraumatic, Normocephalic  EYES: asleep  NECK: No JVD,   NERVOUS SYSTEM: Asleep, severely contracted with yonathan knees to the chest and crossed over each other  CHEST/LUNG: CTA bilaterally; No rales, rhonchi, wheezing, or rubs  HEART: Regular rate and rhythm; No murmurs, rubs, or gallops  ABDOMEN: Soft, Nondistended; Bowel sounds present; Rectal tube in with stools+  EXTREMITIES:  2+ Peripheral Pulses, No clubbing, cyanosis, or edema  SKIN: No rashes or lesions    MEDICATIONS  (STANDING):  acetaminophen   IVPB .. 1000 milliGRAM(s) IV Intermittent once  acetaminophen   IVPB .. 1000 milliGRAM(s) IV Intermittent once  BUpivacaine liposome 1.3% Injectable 20 milliLiter(s) Local Injection once  cefoTEtan  IVPB 2 Gram(s) IV Intermittent once  cholecalciferol 2000 Unit(s) Oral daily  escitalopram 5 milliGRAM(s) Oral daily  fluticasone propionate 50 MICROgram(s)/spray Nasal Spray 1 Spray(s) Both Nostrils every 12 hours  ketorolac   Injectable 15 milliGRAM(s) IV Push every 6 hours  lactated ringers. 1000 milliLiter(s) (75 mL/Hr) IV Continuous <Continuous>  lactated ringers. 1000 milliLiter(s) (75 mL/Hr) IV Continuous <Continuous>  lactobacillus acidophilus 1 Tablet(s) Oral daily  levothyroxine Injectable 12.5 MICROGram(s) IV Push <User Schedule>  loratadine 10 milliGRAM(s) Oral daily  mesalamine DR Capsule 800 milliGRAM(s) Oral every 12 hours  mesalamine ER Capsule 375 milliGRAM(s) Oral daily  polyethylene glycol 3350 17 Gram(s) Oral daily  potassium chloride  10 mEq/100 mL IVPB 10 milliEquivalent(s) IV Intermittent every 1 hour  potassium chloride  10 mEq/100 mL IVPB 10 milliEquivalent(s) IV Intermittent every 1 hour    MEDICATIONS  (PRN):  ALBUTerol    90 MICROgram(s) HFA Inhaler 2 Puff(s) Inhalation every 6 hours PRN Shortness of Breath and/or Wheezing  aluminum hydroxide/magnesium hydroxide/simethicone Suspension 30 milliLiter(s) Oral every 4 hours PRN Dyspepsia  fentaNYL    Injectable 25 MICROGram(s) IV Push every 5 minutes PRN Moderate Pain (4 - 6)  melatonin 3 milliGRAM(s) Oral at bedtime PRN Insomnia  ondansetron Injectable 4 milliGRAM(s) IV Push every 4 hours PRN Nausea and/or Vomiting  ondansetron Injectable 4 milliGRAM(s) IV Push once PRN Nausea and/or Vomiting      LABS:                        11.7   13.04 )-----------( 259      ( 29 Nov 2021 05:39 )             35.0     11-29    147<H>  |  111<H>  |  27.6<H>  ----------------------------<  103<H>  2.5<LL>   |  24.0  |  0.59    Ca    8.5<L>      29 Nov 2021 05:39  Mg     2.0     11-29      PT/INR - ( 29 Nov 2021 05:39 )   PT: 14.3 sec;   INR: 1.25 ratio         PTT - ( 29 Nov 2021 05:39 )  PTT:25.9 sec

## 2021-11-29 NOTE — PROGRESS NOTE ADULT - TIME BILLING
reviewed flex sig  report , notes, labs
Seen and examined.  Chart reviewed.  Nonambulatory, nonverbal autistic and intellectually challenged chronic NH patient who was admitted two days earlier for sigmoid volvulus.  Detorsed by flex sig and is now ready for sigmoid resection.   Plan for ex-lap, sigmoidectomy and end colostomy.  R/B/A discussed with pt's mom over phone.  She consents to proceed.

## 2021-11-29 NOTE — BRIEF OPERATIVE NOTE - OPERATION/FINDINGS
midline incision encountered significantly dilated cecum and sigmoid colon. Decompression of sigmoid colon with ~650cc of stool evacuated, sigmoid colon resected and ostomy created. Midline closed with 2-0 PDS and skin closed with interrupted monocryl, SAMMY dressing in place

## 2021-11-30 LAB
ANION GAP SERPL CALC-SCNC: 12 MMOL/L — SIGNIFICANT CHANGE UP (ref 5–17)
ANISOCYTOSIS BLD QL: SLIGHT — SIGNIFICANT CHANGE UP
BASOPHILS # BLD AUTO: 0 K/UL — SIGNIFICANT CHANGE UP (ref 0–0.2)
BASOPHILS NFR BLD AUTO: 0 % — SIGNIFICANT CHANGE UP (ref 0–2)
BUN SERPL-MCNC: 27.1 MG/DL — HIGH (ref 8–20)
BURR CELLS BLD QL SMEAR: PRESENT — SIGNIFICANT CHANGE UP
CALCIUM SERPL-MCNC: 8.4 MG/DL — LOW (ref 8.6–10.2)
CHLORIDE SERPL-SCNC: 116 MMOL/L — HIGH (ref 98–107)
CO2 SERPL-SCNC: 20 MMOL/L — LOW (ref 22–29)
CREAT SERPL-MCNC: 0.88 MG/DL — SIGNIFICANT CHANGE UP (ref 0.5–1.3)
EOSINOPHIL # BLD AUTO: 0 K/UL — SIGNIFICANT CHANGE UP (ref 0–0.5)
EOSINOPHIL NFR BLD AUTO: 0 % — SIGNIFICANT CHANGE UP (ref 0–6)
GLUCOSE BLDC GLUCOMTR-MCNC: 101 MG/DL — HIGH (ref 70–99)
GLUCOSE BLDC GLUCOMTR-MCNC: 110 MG/DL — HIGH (ref 70–99)
GLUCOSE BLDC GLUCOMTR-MCNC: 113 MG/DL — HIGH (ref 70–99)
GLUCOSE BLDC GLUCOMTR-MCNC: 125 MG/DL — HIGH (ref 70–99)
GLUCOSE BLDC GLUCOMTR-MCNC: 93 MG/DL — SIGNIFICANT CHANGE UP (ref 70–99)
GLUCOSE SERPL-MCNC: 110 MG/DL — HIGH (ref 70–99)
HCT VFR BLD CALC: 38.3 % — SIGNIFICANT CHANGE UP (ref 34.5–45)
HGB BLD-MCNC: 12 G/DL — SIGNIFICANT CHANGE UP (ref 11.5–15.5)
LYMPHOCYTES # BLD AUTO: 1.4 K/UL — SIGNIFICANT CHANGE UP (ref 1–3.3)
LYMPHOCYTES # BLD AUTO: 12 % — LOW (ref 13–44)
MACROCYTES BLD QL: SLIGHT — SIGNIFICANT CHANGE UP
MAGNESIUM SERPL-MCNC: 2.1 MG/DL — SIGNIFICANT CHANGE UP (ref 1.8–2.6)
MANUAL SMEAR VERIFICATION: SIGNIFICANT CHANGE UP
MCHC RBC-ENTMCNC: 28.8 PG — SIGNIFICANT CHANGE UP (ref 27–34)
MCHC RBC-ENTMCNC: 31.3 GM/DL — LOW (ref 32–36)
MCV RBC AUTO: 91.8 FL — SIGNIFICANT CHANGE UP (ref 80–100)
MICROCYTES BLD QL: SLIGHT — SIGNIFICANT CHANGE UP
MONOCYTES # BLD AUTO: 0.7 K/UL — SIGNIFICANT CHANGE UP (ref 0–0.9)
MONOCYTES NFR BLD AUTO: 6 % — SIGNIFICANT CHANGE UP (ref 2–14)
NEUTROPHILS # BLD AUTO: 9.33 K/UL — HIGH (ref 1.8–7.4)
NEUTROPHILS NFR BLD AUTO: 70 % — SIGNIFICANT CHANGE UP (ref 43–77)
NEUTS BAND # BLD: 10 % — HIGH (ref 0–8)
NRBC # BLD: 0 /100 — SIGNIFICANT CHANGE UP (ref 0–0)
PHOSPHATE SERPL-MCNC: 4.1 MG/DL — SIGNIFICANT CHANGE UP (ref 2.4–4.7)
PLAT MORPH BLD: NORMAL — SIGNIFICANT CHANGE UP
PLATELET # BLD AUTO: 228 K/UL — SIGNIFICANT CHANGE UP (ref 150–400)
POIKILOCYTOSIS BLD QL AUTO: SLIGHT — SIGNIFICANT CHANGE UP
POLYCHROMASIA BLD QL SMEAR: SLIGHT — SIGNIFICANT CHANGE UP
POTASSIUM SERPL-MCNC: 3.9 MMOL/L — SIGNIFICANT CHANGE UP (ref 3.5–5.3)
POTASSIUM SERPL-SCNC: 3.9 MMOL/L — SIGNIFICANT CHANGE UP (ref 3.5–5.3)
RBC # BLD: 4.17 M/UL — SIGNIFICANT CHANGE UP (ref 3.8–5.2)
RBC # FLD: 14.1 % — SIGNIFICANT CHANGE UP (ref 10.3–14.5)
RBC BLD AUTO: ABNORMAL
SODIUM SERPL-SCNC: 148 MMOL/L — HIGH (ref 135–145)
VARIANT LYMPHS # BLD: 2 % — SIGNIFICANT CHANGE UP (ref 0–6)
WBC # BLD: 11.66 K/UL — HIGH (ref 3.8–10.5)
WBC # FLD AUTO: 11.66 K/UL — HIGH (ref 3.8–10.5)

## 2021-11-30 PROCEDURE — 99233 SBSQ HOSP IP/OBS HIGH 50: CPT

## 2021-11-30 RX ORDER — INSULIN LISPRO 100/ML
VIAL (ML) SUBCUTANEOUS EVERY 6 HOURS
Refills: 0 | Status: DISCONTINUED | OUTPATIENT
Start: 2021-11-30 | End: 2021-12-16

## 2021-11-30 RX ORDER — CEFOTETAN DISODIUM 1 G
2 VIAL (EA) INJECTION EVERY 12 HOURS
Refills: 0 | Status: COMPLETED | OUTPATIENT
Start: 2021-11-30 | End: 2021-11-30

## 2021-11-30 RX ORDER — SODIUM CHLORIDE 9 MG/ML
1000 INJECTION, SOLUTION INTRAVENOUS
Refills: 0 | Status: DISCONTINUED | OUTPATIENT
Start: 2021-11-30 | End: 2021-12-01

## 2021-11-30 RX ADMIN — SODIUM CHLORIDE 75 MILLILITER(S): 9 INJECTION, SOLUTION INTRAVENOUS at 00:22

## 2021-11-30 RX ADMIN — Medication 15 MILLIGRAM(S): at 00:38

## 2021-11-30 RX ADMIN — Medication 15 MILLIGRAM(S): at 18:10

## 2021-11-30 RX ADMIN — Medication 100 GRAM(S): at 06:26

## 2021-11-30 RX ADMIN — Medication 15 MILLIGRAM(S): at 06:27

## 2021-11-30 RX ADMIN — Medication 800 MILLIGRAM(S): at 06:27

## 2021-11-30 RX ADMIN — SODIUM CHLORIDE 70 MILLILITER(S): 9 INJECTION, SOLUTION INTRAVENOUS at 23:47

## 2021-11-30 RX ADMIN — Medication 12.5 MICROGRAM(S): at 06:26

## 2021-11-30 RX ADMIN — Medication 100 GRAM(S): at 18:51

## 2021-11-30 RX ADMIN — Medication 15 MILLIGRAM(S): at 13:54

## 2021-11-30 RX ADMIN — Medication 15 MILLIGRAM(S): at 23:43

## 2021-11-30 RX ADMIN — Medication 15 MILLIGRAM(S): at 13:24

## 2021-11-30 RX ADMIN — Medication 15 MILLIGRAM(S): at 06:42

## 2021-11-30 RX ADMIN — ENOXAPARIN SODIUM 40 MILLIGRAM(S): 100 INJECTION SUBCUTANEOUS at 11:26

## 2021-11-30 RX ADMIN — Medication 15 MILLIGRAM(S): at 23:42

## 2021-11-30 RX ADMIN — Medication 1 SPRAY(S): at 17:40

## 2021-11-30 RX ADMIN — Medication 100 MILLIEQUIVALENT(S): at 00:22

## 2021-11-30 RX ADMIN — SODIUM CHLORIDE 70 MILLILITER(S): 9 INJECTION, SOLUTION INTRAVENOUS at 11:25

## 2021-11-30 RX ADMIN — POTASSIUM PHOSPHATE, MONOBASIC POTASSIUM PHOSPHATE, DIBASIC 62.5 MILLIMOLE(S): 236; 224 INJECTION, SOLUTION INTRAVENOUS at 00:23

## 2021-11-30 RX ADMIN — Medication 15 MILLIGRAM(S): at 00:23

## 2021-11-30 RX ADMIN — Medication 15 MILLIGRAM(S): at 17:40

## 2021-11-30 RX ADMIN — Medication 1 SPRAY(S): at 06:26

## 2021-11-30 NOTE — PROGRESS NOTE ADULT - SUBJECTIVE AND OBJECTIVE BOX
INTERVAL HPI/OVERNIGHT EVENTS:    Patient seen and evaluated at bedside and found hemodynamically stable and in no acute distress. No acute events overnight. Pain is well controlled with ATC tylenol and toradol for 3 day. Currently NPO, no signs of nausea or emesis, ostomy pink and viable with mild sweat. Denies fevers, chills, chest pain, SOB, coughing, dizziness, n/v/d, or generalized malaise.       STATUS POST:  Bruce's procedure     POST OPERATIVE DAY #: 1    SUBJECTIVE:      MEDICATIONS  (STANDING):  BUpivacaine liposome 1.3% Injectable 20 milliLiter(s) Local Injection once  cholecalciferol 2000 Unit(s) Oral daily  dextrose 40% Gel 15 Gram(s) Oral once  dextrose 5%. 1000 milliLiter(s) (50 mL/Hr) IV Continuous <Continuous>  dextrose 5%. 1000 milliLiter(s) (100 mL/Hr) IV Continuous <Continuous>  dextrose 50% Injectable 25 Gram(s) IV Push once  dextrose 50% Injectable 12.5 Gram(s) IV Push once  dextrose 50% Injectable 25 Gram(s) IV Push once  enoxaparin Injectable 40 milliGRAM(s) SubCutaneous daily  escitalopram 5 milliGRAM(s) Oral daily  fluticasone propionate 50 MICROgram(s)/spray Nasal Spray 1 Spray(s) Both Nostrils every 12 hours  glucagon  Injectable 1 milliGRAM(s) IntraMuscular once  insulin lispro (ADMELOG) corrective regimen sliding scale   SubCutaneous three times a day before meals  ketorolac   Injectable 15 milliGRAM(s) IV Push every 6 hours  lactated ringers. 1000 milliLiter(s) (75 mL/Hr) IV Continuous <Continuous>  lactobacillus acidophilus 1 Tablet(s) Oral daily  levothyroxine Injectable 12.5 MICROGram(s) IV Push <User Schedule>  loratadine 10 milliGRAM(s) Oral daily  mesalamine DR Capsule 800 milliGRAM(s) Oral every 12 hours  mesalamine ER Capsule 375 milliGRAM(s) Oral daily  polyethylene glycol 3350 17 Gram(s) Oral daily  potassium chloride  10 mEq/100 mL IVPB 10 milliEquivalent(s) IV Intermittent every 1 hour  potassium chloride  10 mEq/100 mL IVPB 10 milliEquivalent(s) IV Intermittent every 1 hour    MEDICATIONS  (PRN):  ALBUTerol    90 MICROgram(s) HFA Inhaler 2 Puff(s) Inhalation every 6 hours PRN Shortness of Breath and/or Wheezing  aluminum hydroxide/magnesium hydroxide/simethicone Suspension 30 milliLiter(s) Oral every 4 hours PRN Dyspepsia  melatonin 3 milliGRAM(s) Oral at bedtime PRN Insomnia      Vital Signs Last 24 Hrs  T(C): 36.7 (29 Nov 2021 22:00), Max: 37.1 (29 Nov 2021 04:03)  T(F): 98.1 (29 Nov 2021 22:00), Max: 98.8 (29 Nov 2021 04:03)  HR: 98 (29 Nov 2021 22:00) (64 - 98)  BP: 90/58 (29 Nov 2021 22:00) (90/58 - 114/71)  BP(mean): --  RR: 18 (29 Nov 2021 22:00) (16 - 22)  SpO2: 94% (29 Nov 2021 22:00) (92% - 100%)    PHYSICAL EXAM:    General: lying in bed in no acute distress  HEENT: Neck supple  Chest: non-labored breathing or conversational dyspnea   Abdomen: non-distended, soft and depressible, non-tender. No guarding or rebound, non-peritonitic. Ostomy pink and viable, mild ostomy sweat  Ext: no edema or cyanosis    I&O's Detail    28 Nov 2021 07:01  -  29 Nov 2021 07:00  --------------------------------------------------------  IN:    sodium chloride 0.9%: 720 mL  Total IN: 720 mL    OUT:  Total OUT: 0 mL    Total NET: 720 mL      29 Nov 2021 07:01  -  30 Nov 2021 01:22  --------------------------------------------------------  IN:  Total IN: 0 mL    OUT:    Indwelling Catheter - Urethral (mL): 300 mL  Total OUT: 300 mL    Total NET: -300 mL          LABS:                        11.7   13.04 )-----------( 259      ( 29 Nov 2021 05:39 )             35.0     11-29    146<H>  |  114<H>  |  25.4<H>  ----------------------------<  119<H>  3.0<L>   |  20.0<L>  |  0.57    Ca    7.6<L>      29 Nov 2021 16:48  Phos  2.5     11-29  Mg     1.9     11-29      PT/INR - ( 29 Nov 2021 05:39 )   PT: 14.3 sec;   INR: 1.25 ratio         PTT - ( 29 Nov 2021 05:39 )  PTT:25.9 sec

## 2021-11-30 NOTE — PROGRESS NOTE ADULT - ASSESSMENT
Patient is a 61y old female with severe developmental delay, nonverbal at baseline transferred for sigmoid volvulus s/p sigmoid decompression. Due to likelihood of recurrence, surgical intervention recommended. Now s/p open sigmoidectomy with ostomy creation     PLAN:    -NPO/IVF  -Pain control - ATC rather than PRN   -Awaiting ostomy function  -Monitor lytes, replete PRN

## 2021-11-30 NOTE — PROGRESS NOTE ADULT - ATTENDING COMMENTS
61yoF POD#1 s/p Justin procedure for sigmoid volvulus.  Patient appears comfortable and passed flatus into her appliance during my evaluation.  Abdomen is soft, nondistended.  Midline dressing saturated and needs to be changed.  Ostomy pink and well-perfused in appearance.    Diet may be advanced slowly - can begin clear liquids in small amounts.  Heparin being switched to Lovenox.

## 2021-11-30 NOTE — PROGRESS NOTE ADULT - ASSESSMENT
61 year old female with PMH of autism, cataracts, constipation, nonverbal at baseline with mental retardation, osteoporosis, hypothyroidism coming to hospital after being transferred from [facility] for sigmoid volvulus. patient unable to provided history given non verbal w/ mental retardation. seen by surgery and GI for flex sigmoidoscopy today via GI for reduction of volvulus. at baseline patient tolerating puree with nectar thick diet. per chart patient was transfer to [facility] [place] given abdominal distention with agitation and respiratory depression after dinner.     # Sigmoid volvulus- now post op  - s/p status post colonoscopic decompression and rectal tube placement 11/27  - s/p Bruce's procedure on 11/29  - Await ostomy functioning before starting PO diet  - Pain meds to be given RTC instead of PRN  - to observe for nonverbal cues of pain  - Gentle IVF    # Mild hypernatremia  - sec to IVF  - changed IVF to Dext + LR    # s/p Hypokalemia  repleted    # Intellectual disability, Non verbal at baseline, Autism,   - at baseline  - supportive care    # IBS  - mesalamine    # depression   - escitalopram    # Hypothyroidism  - levothyroxine changed to IV, since NPO    #dvt prophylaxis  - heparin SC       patient rodriguez Howe on phone [phone] is the [facility]    patient group [facility] [phone]  61 year old female with PMH of autism, cataracts, constipation, nonverbal at baseline with mental retardation, osteoporosis, hypothyroidism coming to hospital after being transferred from [facility] [place] for sigmoid volvulus. patient unable to provided history given non verbal w/ mental retardation. seen by surgery and GI for flex sigmoidoscopy today via GI for reduction of volvulus. at baseline patient tolerating puree with nectar thick diet. per chart patient was transfer to [facility] given abdominal distention with agitation and respiratory depression after dinner.     # Sigmoid volvulus- now post op  - s/p status post colonoscopic decompression and rectal tube placement 11/27  - s/p Bruce's procedure on 11/29  - Await ostomy functioning before starting PO diet  - Pain meds to be given RTC instead of PRN  - to observe for nonverbal cues of pain  - Gentle IVF    # Mild hypernatremia  - sec to IVF  - changed IVF to Dext + LR    # s/p Hypokalemia  repleted    # Intellectual disability, Non verbal at baseline, Autism,   - at baseline  - supportive care    # IBS  - mesalamine    # depression   - escitalopram    # Hypothyroidism  - levothyroxine changed to IV, since NPO    # Functional quadriplegia  - full supportive care    #dvt prophylaxis  - heparin SC       patient rodriguez Howe on phone [phone] is the [facility]    patient group [facility] [phone]

## 2021-11-30 NOTE — PROGRESS NOTE ADULT - SUBJECTIVE AND OBJECTIVE BOX
HEALTH ISSUES - PROBLEM Dx:    sigmoid volvulus s/p Bruce's procedure - post op    INTERVAL HPI/ OVERNIGHT EVENTS:    lying in bed  appears comfortable  tries to wake up to her name  but does not focus  goes back to sleep  non verbal at baseline    REVIEW OF SYSTEMS:    as above    Vital Signs Last 24 Hrs  T(C): 36.7 (30 Nov 2021 15:38), Max: 36.8 (30 Nov 2021 11:09)  T(F): 98.1 (30 Nov 2021 15:38), Max: 98.2 (30 Nov 2021 11:09)  HR: 86 (30 Nov 2021 15:38) (73 - 98)  BP: 91/52 (30 Nov 2021 15:38) (90/58 - 113/70)  BP(mean): --  RR: 18 (30 Nov 2021 15:38) (18 - 18)  SpO2: 92% (30 Nov 2021 15:38) (92% - 94%)    PHYSICAL EXAM-  GENERAL: comfortable, asleep  HEAD:  Atraumatic, Normocephalic  EYES: asleep  NECK: No JVD,   NERVOUS SYSTEM: Asleep, attempts to wake up to name, legs stretched out with flexed knees today, keeps her UE crossed  CHEST/LUNG: CTA bilaterally; No rales, rhonchi, wheezing, or rubs  HEART: Regular rate and rhythm; No murmurs, rubs, or gallops  ABDOMEN: Soft, Nondistended; Bowel sounds present; Ostomy with sweat in the bag +  EXTREMITIES:  2+ Peripheral Pulses, No clubbing, cyanosis, or edema  SKIN: No rashes or lesions    MEDICATIONS  (STANDING):  BUpivacaine liposome 1.3% Injectable 20 milliLiter(s) Local Injection once  cholecalciferol 2000 Unit(s) Oral daily  dextrose 40% Gel 15 Gram(s) Oral once  dextrose 5% + lactated ringers. 1000 milliLiter(s) (70 mL/Hr) IV Continuous <Continuous>  dextrose 5%. 1000 milliLiter(s) (50 mL/Hr) IV Continuous <Continuous>  dextrose 5%. 1000 milliLiter(s) (100 mL/Hr) IV Continuous <Continuous>  dextrose 50% Injectable 25 Gram(s) IV Push once  dextrose 50% Injectable 12.5 Gram(s) IV Push once  dextrose 50% Injectable 25 Gram(s) IV Push once  enoxaparin Injectable 40 milliGRAM(s) SubCutaneous daily  escitalopram 5 milliGRAM(s) Oral daily  fluticasone propionate 50 MICROgram(s)/spray Nasal Spray 1 Spray(s) Both Nostrils every 12 hours  glucagon  Injectable 1 milliGRAM(s) IntraMuscular once  insulin lispro (ADMELOG) corrective regimen sliding scale   SubCutaneous every 6 hours  ketorolac   Injectable 15 milliGRAM(s) IV Push every 6 hours  lactobacillus acidophilus 1 Tablet(s) Oral daily  levothyroxine Injectable 12.5 MICROGram(s) IV Push <User Schedule>  loratadine 10 milliGRAM(s) Oral daily  mesalamine DR Capsule 800 milliGRAM(s) Oral every 12 hours  mesalamine ER Capsule 375 milliGRAM(s) Oral daily  polyethylene glycol 3350 17 Gram(s) Oral daily  potassium chloride  10 mEq/100 mL IVPB 10 milliEquivalent(s) IV Intermittent every 1 hour  potassium chloride  10 mEq/100 mL IVPB 10 milliEquivalent(s) IV Intermittent every 1 hour    MEDICATIONS  (PRN):  ALBUTerol    90 MICROgram(s) HFA Inhaler 2 Puff(s) Inhalation every 6 hours PRN Shortness of Breath and/or Wheezing  aluminum hydroxide/magnesium hydroxide/simethicone Suspension 30 milliLiter(s) Oral every 4 hours PRN Dyspepsia  melatonin 3 milliGRAM(s) Oral at bedtime PRN Insomnia      LABS:                        12.0   11.66 )-----------( 228      ( 30 Nov 2021 05:26 )             38.3     11-30    148<H>  |  116<H>  |  27.1<H>  ----------------------------<  110<H>  3.9   |  20.0<L>  |  0.88    Ca    8.4<L>      30 Nov 2021 05:26  Phos  4.1     11-30  Mg     2.1     11-30      PT/INR - ( 29 Nov 2021 05:39 )   PT: 14.3 sec;   INR: 1.25 ratio         PTT - ( 29 Nov 2021 05:39 )  PTT:25.9 sec

## 2021-11-30 NOTE — CDI QUERY NOTE - NSCDIOTHERTXTBX_GEN_ALL_CORE_HH
H&P- Physical Exam:  Constitutional: NAD, laying in bed contracted  #nonverbal w/ mental retardation     Cardio- Pt is bedbound and has no functional capacity    Functional Assessment    Functional Screen Current(every 3 days/sig change)  Ambulation Ambulation: 4 = completely dependent  Transferrin = completely dependent  Toiletin = completely dependent  Bathin = completely dependent  Dressin = completely dependent  Eatin = completely dependent  Communication: 3 = unable to understand or speak (not related to language barrier)    Is there a diagnosis associated with above findings?    1) Functional quadriplegia  2) Complete immobility due to severe physical disability  3) Other  4) Not clinically significant

## 2021-12-01 LAB
ANION GAP SERPL CALC-SCNC: 9 MMOL/L — SIGNIFICANT CHANGE UP (ref 5–17)
BUN SERPL-MCNC: 22.9 MG/DL — HIGH (ref 8–20)
CALCIUM SERPL-MCNC: 8 MG/DL — LOW (ref 8.6–10.2)
CHLORIDE SERPL-SCNC: 116 MMOL/L — HIGH (ref 98–107)
CO2 SERPL-SCNC: 24 MMOL/L — SIGNIFICANT CHANGE UP (ref 22–29)
CREAT SERPL-MCNC: 0.56 MG/DL — SIGNIFICANT CHANGE UP (ref 0.5–1.3)
GLUCOSE BLDC GLUCOMTR-MCNC: 102 MG/DL — HIGH (ref 70–99)
GLUCOSE BLDC GLUCOMTR-MCNC: 103 MG/DL — HIGH (ref 70–99)
GLUCOSE BLDC GLUCOMTR-MCNC: 127 MG/DL — HIGH (ref 70–99)
GLUCOSE BLDC GLUCOMTR-MCNC: 87 MG/DL — SIGNIFICANT CHANGE UP (ref 70–99)
GLUCOSE SERPL-MCNC: 126 MG/DL — HIGH (ref 70–99)
HCT VFR BLD CALC: 31.8 % — LOW (ref 34.5–45)
HGB BLD-MCNC: 10.3 G/DL — LOW (ref 11.5–15.5)
MAGNESIUM SERPL-MCNC: 2.1 MG/DL — SIGNIFICANT CHANGE UP (ref 1.6–2.6)
MCHC RBC-ENTMCNC: 28.6 PG — SIGNIFICANT CHANGE UP (ref 27–34)
MCHC RBC-ENTMCNC: 32.4 GM/DL — SIGNIFICANT CHANGE UP (ref 32–36)
MCV RBC AUTO: 88.3 FL — SIGNIFICANT CHANGE UP (ref 80–100)
PLATELET # BLD AUTO: 233 K/UL — SIGNIFICANT CHANGE UP (ref 150–400)
POTASSIUM SERPL-MCNC: 3.2 MMOL/L — LOW (ref 3.5–5.3)
POTASSIUM SERPL-SCNC: 3.2 MMOL/L — LOW (ref 3.5–5.3)
RBC # BLD: 3.6 M/UL — LOW (ref 3.8–5.2)
RBC # FLD: 14.2 % — SIGNIFICANT CHANGE UP (ref 10.3–14.5)
SODIUM SERPL-SCNC: 149 MMOL/L — HIGH (ref 135–145)
WBC # BLD: 9.63 K/UL — SIGNIFICANT CHANGE UP (ref 3.8–10.5)
WBC # FLD AUTO: 9.63 K/UL — SIGNIFICANT CHANGE UP (ref 3.8–10.5)

## 2021-12-01 PROCEDURE — 99233 SBSQ HOSP IP/OBS HIGH 50: CPT

## 2021-12-01 RX ORDER — SODIUM CHLORIDE 9 MG/ML
1000 INJECTION, SOLUTION INTRAVENOUS
Refills: 0 | Status: DISCONTINUED | OUTPATIENT
Start: 2021-12-01 | End: 2021-12-03

## 2021-12-01 RX ORDER — POTASSIUM CHLORIDE 20 MEQ
10 PACKET (EA) ORAL EVERY 6 HOURS
Refills: 0 | Status: COMPLETED | OUTPATIENT
Start: 2021-12-01 | End: 2021-12-01

## 2021-12-01 RX ORDER — SODIUM CHLORIDE 9 MG/ML
1000 INJECTION, SOLUTION INTRAVENOUS
Refills: 0 | Status: DISCONTINUED | OUTPATIENT
Start: 2021-12-01 | End: 2021-12-01

## 2021-12-01 RX ORDER — SODIUM CHLORIDE 9 MG/ML
1000 INJECTION, SOLUTION INTRAVENOUS ONCE
Refills: 0 | Status: COMPLETED | OUTPATIENT
Start: 2021-12-01 | End: 2021-12-01

## 2021-12-01 RX ADMIN — POLYETHYLENE GLYCOL 3350 17 GRAM(S): 17 POWDER, FOR SOLUTION ORAL at 13:10

## 2021-12-01 RX ADMIN — SODIUM CHLORIDE 250 MILLILITER(S): 9 INJECTION, SOLUTION INTRAVENOUS at 17:46

## 2021-12-01 RX ADMIN — ENOXAPARIN SODIUM 40 MILLIGRAM(S): 100 INJECTION SUBCUTANEOUS at 13:09

## 2021-12-01 RX ADMIN — Medication 15 MILLIGRAM(S): at 22:41

## 2021-12-01 RX ADMIN — Medication 15 MILLIGRAM(S): at 13:10

## 2021-12-01 RX ADMIN — Medication 1 SPRAY(S): at 18:01

## 2021-12-01 RX ADMIN — Medication 12.5 MICROGRAM(S): at 05:54

## 2021-12-01 RX ADMIN — Medication 15 MILLIGRAM(S): at 05:55

## 2021-12-01 RX ADMIN — Medication 15 MILLIGRAM(S): at 23:15

## 2021-12-01 RX ADMIN — Medication 15 MILLIGRAM(S): at 18:02

## 2021-12-01 RX ADMIN — Medication 15 MILLIGRAM(S): at 17:46

## 2021-12-01 RX ADMIN — Medication 1 SPRAY(S): at 05:56

## 2021-12-01 RX ADMIN — Medication 100 MILLIEQUIVALENT(S): at 17:46

## 2021-12-01 RX ADMIN — Medication 2000 UNIT(S): at 13:09

## 2021-12-01 RX ADMIN — Medication 1 TABLET(S): at 13:09

## 2021-12-01 RX ADMIN — Medication 15 MILLIGRAM(S): at 13:40

## 2021-12-01 RX ADMIN — Medication 15 MILLIGRAM(S): at 05:56

## 2021-12-01 RX ADMIN — Medication 100 MILLIEQUIVALENT(S): at 22:40

## 2021-12-01 RX ADMIN — ESCITALOPRAM OXALATE 5 MILLIGRAM(S): 10 TABLET, FILM COATED ORAL at 13:10

## 2021-12-01 RX ADMIN — Medication 800 MILLIGRAM(S): at 17:46

## 2021-12-01 RX ADMIN — LORATADINE 10 MILLIGRAM(S): 10 TABLET ORAL at 17:46

## 2021-12-01 NOTE — PROGRESS NOTE ADULT - SUBJECTIVE AND OBJECTIVE BOX
HPI/OVERNIGHT EVENTS:  NAEON. Patient medically stable. Bowel sweat via ostomy, abdomen is soft. Patient unable to provide subjective.     MEDICATIONS  (STANDING):  BUpivacaine liposome 1.3% Injectable 20 milliLiter(s) Local Injection once  cholecalciferol 2000 Unit(s) Oral daily  dextrose 40% Gel 15 Gram(s) Oral once  dextrose 5% + lactated ringers. 1000 milliLiter(s) (70 mL/Hr) IV Continuous <Continuous>  dextrose 5%. 1000 milliLiter(s) (50 mL/Hr) IV Continuous <Continuous>  dextrose 5%. 1000 milliLiter(s) (100 mL/Hr) IV Continuous <Continuous>  dextrose 50% Injectable 25 Gram(s) IV Push once  dextrose 50% Injectable 12.5 Gram(s) IV Push once  dextrose 50% Injectable 25 Gram(s) IV Push once  enoxaparin Injectable 40 milliGRAM(s) SubCutaneous daily  escitalopram 5 milliGRAM(s) Oral daily  fluticasone propionate 50 MICROgram(s)/spray Nasal Spray 1 Spray(s) Both Nostrils every 12 hours  glucagon  Injectable 1 milliGRAM(s) IntraMuscular once  insulin lispro (ADMELOG) corrective regimen sliding scale   SubCutaneous every 6 hours  ketorolac   Injectable 15 milliGRAM(s) IV Push every 6 hours  lactobacillus acidophilus 1 Tablet(s) Oral daily  levothyroxine Injectable 12.5 MICROGram(s) IV Push <User Schedule>  loratadine 10 milliGRAM(s) Oral daily  mesalamine DR Capsule 800 milliGRAM(s) Oral every 12 hours  mesalamine ER Capsule 375 milliGRAM(s) Oral daily  polyethylene glycol 3350 17 Gram(s) Oral daily  potassium chloride  10 mEq/100 mL IVPB 10 milliEquivalent(s) IV Intermittent every 1 hour  potassium chloride  10 mEq/100 mL IVPB 10 milliEquivalent(s) IV Intermittent every 1 hour    MEDICATIONS  (PRN):  ALBUTerol    90 MICROgram(s) HFA Inhaler 2 Puff(s) Inhalation every 6 hours PRN Shortness of Breath and/or Wheezing  aluminum hydroxide/magnesium hydroxide/simethicone Suspension 30 milliLiter(s) Oral every 4 hours PRN Dyspepsia  melatonin 3 milliGRAM(s) Oral at bedtime PRN Insomnia      Vital Signs Last 24 Hrs  T(C): 36.7 (30 Nov 2021 23:57), Max: 36.8 (30 Nov 2021 11:09)  T(F): 98 (30 Nov 2021 23:57), Max: 98.2 (30 Nov 2021 11:09)  HR: 91 (30 Nov 2021 23:57) (73 - 99)  BP: 124/76 (30 Nov 2021 23:57) (91/52 - 124/76)  BP(mean): --  RR: 18 (30 Nov 2021 23:57) (18 - 18)  SpO2: 93% (30 Nov 2021 23:57) (92% - 93%)    Constitutional: patient resting comfortably in bed, in no acute distress  Respiratory: respirations are unlabored, no accessory muscle use, no conversational dyspnea  Gastrointestinal: Abdomen soft, non-tender, non-distended, no rebound tenderness / guarding, ostomy pink and patent   Musculoskeletal: severely contracted       I&O's Detail    29 Nov 2021 07:01  -  30 Nov 2021 07:00  --------------------------------------------------------  IN:  Total IN: 0 mL    OUT:    Indwelling Catheter - Urethral (mL): 650 mL  Total OUT: 650 mL    Total NET: -650 mL      30 Nov 2021 07:01  -  01 Dec 2021 03:38  --------------------------------------------------------  IN:    dextrose 5% + lactated ringers: 560 mL  Total IN: 560 mL    OUT:    Indwelling Catheter - Urethral (mL): 350 mL    Oral Fluid: 0 mL  Total OUT: 350 mL    Total NET: 210 mL          LABS:                        12.0   11.66 )-----------( 228      ( 30 Nov 2021 05:26 )             38.3     11-30    148<H>  |  116<H>  |  27.1<H>  ----------------------------<  110<H>  3.9   |  20.0<L>  |  0.88    Ca    8.4<L>      30 Nov 2021 05:26  Phos  4.1     11-30  Mg     2.1     11-30      PT/INR - ( 29 Nov 2021 05:39 )   PT: 14.3 sec;   INR: 1.25 ratio         PTT - ( 29 Nov 2021 05:39 )  PTT:25.9 sec     HPI/OVERNIGHT EVENTS:  NAEON. Patient medically stable. Bowel sweat via ostomy, abdomen is soft. Patient unable to provide subjective.     MEDICATIONS  (STANDING):  BUpivacaine liposome 1.3% Injectable 20 milliLiter(s) Local Injection once  cholecalciferol 2000 Unit(s) Oral daily  dextrose 40% Gel 15 Gram(s) Oral once  dextrose 5% + lactated ringers. 1000 milliLiter(s) (70 mL/Hr) IV Continuous <Continuous>  dextrose 5%. 1000 milliLiter(s) (50 mL/Hr) IV Continuous <Continuous>  dextrose 5%. 1000 milliLiter(s) (100 mL/Hr) IV Continuous <Continuous>  dextrose 50% Injectable 25 Gram(s) IV Push once  dextrose 50% Injectable 12.5 Gram(s) IV Push once  dextrose 50% Injectable 25 Gram(s) IV Push once  enoxaparin Injectable 40 milliGRAM(s) SubCutaneous daily  escitalopram 5 milliGRAM(s) Oral daily  fluticasone propionate 50 MICROgram(s)/spray Nasal Spray 1 Spray(s) Both Nostrils every 12 hours  glucagon  Injectable 1 milliGRAM(s) IntraMuscular once  insulin lispro (ADMELOG) corrective regimen sliding scale   SubCutaneous every 6 hours  ketorolac   Injectable 15 milliGRAM(s) IV Push every 6 hours  lactobacillus acidophilus 1 Tablet(s) Oral daily  levothyroxine Injectable 12.5 MICROGram(s) IV Push <User Schedule>  loratadine 10 milliGRAM(s) Oral daily  mesalamine DR Capsule 800 milliGRAM(s) Oral every 12 hours  mesalamine ER Capsule 375 milliGRAM(s) Oral daily  polyethylene glycol 3350 17 Gram(s) Oral daily  potassium chloride  10 mEq/100 mL IVPB 10 milliEquivalent(s) IV Intermittent every 1 hour  potassium chloride  10 mEq/100 mL IVPB 10 milliEquivalent(s) IV Intermittent every 1 hour    MEDICATIONS  (PRN):  ALBUTerol    90 MICROgram(s) HFA Inhaler 2 Puff(s) Inhalation every 6 hours PRN Shortness of Breath and/or Wheezing  aluminum hydroxide/magnesium hydroxide/simethicone Suspension 30 milliLiter(s) Oral every 4 hours PRN Dyspepsia  melatonin 3 milliGRAM(s) Oral at bedtime PRN Insomnia      Vital Signs Last 24 Hrs  T(C): 36.7 (30 Nov 2021 23:57), Max: 36.8 (30 Nov 2021 11:09)  T(F): 98 (30 Nov 2021 23:57), Max: 98.2 (30 Nov 2021 11:09)  HR: 91 (30 Nov 2021 23:57) (73 - 99)  BP: 124/76 (30 Nov 2021 23:57) (91/52 - 124/76)  RR: 18 (30 Nov 2021 23:57) (18 - 18)  SpO2: 93% (30 Nov 2021 23:57) (92% - 93%)    Constitutional: patient resting comfortably in bed, in no acute distress  Respiratory: respirations are unlabored, no accessory muscle use, no conversational dyspnea  Gastrointestinal: Abdomen soft, non-tender, non-distended, no rebound tenderness / guarding, ostomy pink and patent   Musculoskeletal: severely contracted       I&O's Detail    29 Nov 2021 07:01  -  30 Nov 2021 07:00  --------------------------------------------------------  IN:  Total IN: 0 mL    OUT:    Indwelling Catheter - Urethral (mL): 650 mL  Total OUT: 650 mL    Total NET: -650 mL      30 Nov 2021 07:01  -  01 Dec 2021 03:38  --------------------------------------------------------  IN:    dextrose 5% + lactated ringers: 560 mL  Total IN: 560 mL    OUT:    Indwelling Catheter - Urethral (mL): 350 mL    Oral Fluid: 0 mL  Total OUT: 350 mL    Total NET: 210 mL    LABS:                        12.0   11.66 )-----------( 228      ( 30 Nov 2021 05:26 )             38.3     11-30    148<H>  |  116<H>  |  27.1<H>  ----------------------------<  110<H>  3.9   |  20.0<L>  |  0.88    Ca    8.4<L>      30 Nov 2021 05:26  Phos  4.1     11-30  Mg     2.1     11-30      PT/INR - ( 29 Nov 2021 05:39 )   PT: 14.3 sec;   INR: 1.25 ratio         PTT - ( 29 Nov 2021 05:39 )  PTT:25.9 sec

## 2021-12-01 NOTE — PROGRESS NOTE ADULT - ATTENDING COMMENTS
Patient seen and examined. She has been stable. She is s/p lau's for sigmoid volvulus. Abdomen is soft. No subjective information can be obtained. Colostomy is healthy and there is air in the bag, no stool yet. D/c jain, start liquid diet. Have pt sitting up in bed as much as possible. Free water for hypernatremia, currently getting IVFs. Replace potassium. Monitor sodium. Hgb 10 from 12, anemia from surgery. No concern for acute bleeding.

## 2021-12-01 NOTE — PROGRESS NOTE ADULT - SUBJECTIVE AND OBJECTIVE BOX
HEALTH ISSUES - PROBLEM Dx:    sigmoid volvulus s/p Bruce's procedure - post op    INTERVAL HPI/ OVERNIGHT EVENTS:    lying in bed  awake, grabbing at air  does not focus  pushes hands away for abd exam  and same for trying to feed PO liquids  appears comfortable    REVIEW OF SYSTEMS:    as above    Vital Signs Last 24 Hrs  T(C): 36.3 (01 Dec 2021 10:22), Max: 36.7 (30 Nov 2021 15:38)  T(F): 97.3 (01 Dec 2021 10:22), Max: 98.1 (30 Nov 2021 15:38)  HR: 90 (01 Dec 2021 10:22) (79 - 99)  BP: 101/71 (01 Dec 2021 10:22) (91/52 - 124/76)  BP(mean): --  RR: 18 (01 Dec 2021 10:22) (18 - 18)  SpO2: 93% (01 Dec 2021 10:22) (92% - 93%)    PHYSICAL EXAM-  GENERAL: comfortable, awake, grabbing at air  HEAD:  Atraumatic, Normocephalic  EYES: conjunctiva clear  NECK: No JVD,   NERVOUS SYSTEM: awake, does not focus, non verbal, grabbing at air, does thwart hands away when offered PO liquids or for abd exam, legs stretched with flexed knees  CHEST/LUNG: CTA bilaterally; No rales, rhonchi, wheezing, or rubs  HEART: Regular rate and rhythm; No murmurs, rubs, or gallops  ABDOMEN: Soft, Nondistended; Bowel sounds not appreciable; Ostomy with sweat in the bag + and some flatus  EXTREMITIES:  2+ Peripheral Pulses, No clubbing, cyanosis, or edema  SKIN: No rashes or lesions    MEDICATIONS  (STANDING):  cholecalciferol 2000 Unit(s) Oral daily  dextrose 40% Gel 15 Gram(s) Oral once  dextrose 5% + lactated ringers 1000 milliLiter(s) (100 mL/Hr) IV Continuous <Continuous>  dextrose 5%. 1000 milliLiter(s) (50 mL/Hr) IV Continuous <Continuous>  dextrose 5%. 1000 milliLiter(s) (100 mL/Hr) IV Continuous <Continuous>  dextrose 50% Injectable 25 Gram(s) IV Push once  dextrose 50% Injectable 12.5 Gram(s) IV Push once  dextrose 50% Injectable 25 Gram(s) IV Push once  enoxaparin Injectable 40 milliGRAM(s) SubCutaneous daily  escitalopram 5 milliGRAM(s) Oral daily  fluticasone propionate 50 MICROgram(s)/spray Nasal Spray 1 Spray(s) Both Nostrils every 12 hours  glucagon  Injectable 1 milliGRAM(s) IntraMuscular once  insulin lispro (ADMELOG) corrective regimen sliding scale   SubCutaneous every 6 hours  ketorolac   Injectable 15 milliGRAM(s) IV Push every 6 hours  lactobacillus acidophilus 1 Tablet(s) Oral daily  levothyroxine Injectable 12.5 MICROGram(s) IV Push <User Schedule>  loratadine 10 milliGRAM(s) Oral daily  mesalamine DR Capsule 800 milliGRAM(s) Oral every 12 hours  mesalamine ER Capsule 375 milliGRAM(s) Oral daily  polyethylene glycol 3350 17 Gram(s) Oral daily  potassium chloride  10 mEq/100 mL IVPB 10 milliEquivalent(s) IV Intermittent every 1 hour  potassium chloride  10 mEq/100 mL IVPB 10 milliEquivalent(s) IV Intermittent every 1 hour  potassium chloride  10 mEq/100 mL IVPB 10 milliEquivalent(s) IV Intermittent every 6 hours    MEDICATIONS  (PRN):  ALBUTerol    90 MICROgram(s) HFA Inhaler 2 Puff(s) Inhalation every 6 hours PRN Shortness of Breath and/or Wheezing  aluminum hydroxide/magnesium hydroxide/simethicone Suspension 30 milliLiter(s) Oral every 4 hours PRN Dyspepsia  melatonin 3 milliGRAM(s) Oral at bedtime PRN Insomnia      LABS:                        10.3   9.63  )-----------( 233      ( 01 Dec 2021 09:58 )             31.8     12-01    149<H>  |  116<H>  |  22.9<H>  ----------------------------<  126<H>  3.2<L>   |  24.0  |  0.56    Ca    8.0<L>      01 Dec 2021 09:58  Phos  4.1     11-30  Mg     2.1     12-01

## 2021-12-01 NOTE — PROGRESS NOTE ADULT - ASSESSMENT
Patient is a 61y old female with severe developmental delay, nonverbal at baseline transferred for sigmoid volvulus s/p sigmoid decompression. Due to likelihood of recurrence, surgical intervention recommended. Now s/p open sigmoidectomy with ostomy creation     PLAN:    - Pain control MMD   - Monitor ostomy o/p   - ADAT, consider speech and swallow eval   -Monitor lytes, replete PRN   - Remainder of care per primary team

## 2021-12-01 NOTE — PROGRESS NOTE ADULT - ASSESSMENT
61 year old female with PMH of autism, cataracts, constipation, nonverbal at baseline with mental retardation, osteoporosis, hypothyroidism coming to hospital after being transferred from [facility] for sigmoid volvulus. patient unable to provided history given non verbal w/ mental retardation. seen by surgery and GI for flex sigmoidoscopy today via GI for reduction of volvulus. at baseline patient tolerating puree with nectar thick diet. per chart patient was [place] [facility] [place] abdominal distention with agitation and respiratory depression after dinner.     # Sigmoid volvulus- now post op  - s/p status post colonoscopic decompression and rectal tube placement 11/27  - s/p Bruce's procedure on 11/29  - Ostomy with flatus, no stools yet  - Clears to start today. Aspiration precautions. Complete feeding assistance  - Pain meds to be given RTC instead of PRN  - to observe for nonverbal cues of pain  - IVF to cont for now    # Hypernatremia  - sec to normal saline  - changed IVF to Dext + LR and Kcl  - if persisting, can change to Dextrose alone without LR    # Hypokalemia  - replete    # Intellectual disability, Non verbal at baseline, Autism,   - at baseline  - supportive care    # IBS  - mesalamine    # depression   - escitalopram    # Hypothyroidism  - levothyroxine changed to IV, can change to PO when diet advanced    # Functional quadriplegia  - full supportive care    #dvt prophylaxis  - heparin SC       patient rodriguez Howe on phone [phone] is the [facility]    patient group [facility] [phone]  61 year old female with PMH of autism, cataracts, constipation, nonverbal at baseline with mental retardation, osteoporosis, hypothyroidism coming to hospital after being transferred from [facility] for sigmoid volvulus. patient unable to provided history given non verbal w/ mental retardation. seen by surgery and GI for flex sigmoidoscopy today via GI for reduction of volvulus. at baseline patient tolerating puree with nectar thick diet. per chart patient was transfer to [facility] given abdominal distention with agitation and respiratory depression after dinner.     # Sigmoid volvulus- now post op  - s/p status post colonoscopic decompression and rectal tube placement 11/27  - s/p Bruce's procedure on 11/29  - Ostomy with flatus, no stools yet  - Clears to start today. Aspiration precautions. Complete feeding assistance  - Pain meds to be given RTC instead of PRN  - to observe for nonverbal cues of pain  - IVF to cont for now    # Hypernatremia  - sec to normal saline  - changed IVF to Dext + LR and Kcl  - if persisting, can change to Dextrose alone without LR    # Hypokalemia  - replete    # Acute postoperative Anemia due to blood loss with surgery  - Hgb 12-->10  - Monitor  - Hemodynamically stable    # Intellectual disability, Non verbal at baseline, Autism,   - at baseline  - supportive care    # IBS  - mesalamine    # depression   - escitalopram    # Hypothyroidism  - levothyroxine changed to IV, can change to PO when diet advanced    # Functional quadriplegia  - full supportive care    #dvt prophylaxis  - heparin SC       patient rodriguez Howe on phone [phone] is the [facility]    patient group [facility] [phone]  61 year old female with PMH of autism, cataracts, constipation, nonverbal at baseline with mental retardation, osteoporosis, hypothyroidism coming to hospital after being transferred from [facility] for sigmoid volvulus. patient unable to provided history given non verbal w/ mental retardation. seen by surgery and GI for flex sigmoidoscopy today via GI for reduction of volvulus. at baseline patient tolerating puree with nectar thick diet. per chart patient was transfer to [facility] given abdominal distention with agitation and respiratory depression after dinner.     # Sigmoid volvulus- now post op  - s/p status post colonoscopic decompression and rectal tube placement 11/27  - s/p Bruce's procedure on 11/29  - Ostomy with flatus, no stools yet  - Clears to start today. Aspiration precautions. Complete feeding assistance  - Pain meds to be given RTC instead of PRN  - to observe for nonverbal cues of pain  - IVF to cont for now    # Hypernatremia  - sec to normal saline  - changed IVF to Dext + LR and Kcl  - if persisting, can change to Dextrose alone without LR    # Hypokalemia  - replete    # Acute postoperative Anemia due to blood loss with surgery  - Hgb 12-->10  - Monitor  - Hemodynamically stable    # Intellectual disability, Non verbal at baseline, Autism,   - at baseline  - supportive care    # IBS  - mesalamine    # depression   - escitalopram    # Hypothyroidism  - levothyroxine changed to IV, can change to PO when diet advanced    # Functional quadriplegia  - full supportive care    #dvt prophylaxis  - heparin SC       patient rodriguez Howe on phone [phone] is the [facility]    patient group [facility] [phone]     Surgical service taking over the care of this patient henceforth. Consult Medicine PRN.

## 2021-12-02 LAB
GLUCOSE BLDC GLUCOMTR-MCNC: 108 MG/DL — HIGH (ref 70–99)
GLUCOSE BLDC GLUCOMTR-MCNC: 110 MG/DL — HIGH (ref 70–99)
GLUCOSE BLDC GLUCOMTR-MCNC: 121 MG/DL — HIGH (ref 70–99)
SARS-COV-2 RNA SPEC QL NAA+PROBE: SIGNIFICANT CHANGE UP

## 2021-12-02 RX ADMIN — Medication 1 SPRAY(S): at 17:11

## 2021-12-02 RX ADMIN — Medication 1 SPRAY(S): at 06:00

## 2021-12-02 RX ADMIN — Medication 3 MILLIGRAM(S): at 21:18

## 2021-12-02 RX ADMIN — Medication 15 MILLIGRAM(S): at 13:50

## 2021-12-02 RX ADMIN — SODIUM CHLORIDE 100 MILLILITER(S): 9 INJECTION, SOLUTION INTRAVENOUS at 17:10

## 2021-12-02 RX ADMIN — Medication 15 MILLIGRAM(S): at 13:20

## 2021-12-02 RX ADMIN — Medication 15 MILLIGRAM(S): at 06:00

## 2021-12-02 RX ADMIN — Medication 800 MILLIGRAM(S): at 06:25

## 2021-12-02 RX ADMIN — LORATADINE 10 MILLIGRAM(S): 10 TABLET ORAL at 13:21

## 2021-12-02 RX ADMIN — POLYETHYLENE GLYCOL 3350 17 GRAM(S): 17 POWDER, FOR SOLUTION ORAL at 13:22

## 2021-12-02 RX ADMIN — Medication 15 MILLIGRAM(S): at 06:27

## 2021-12-02 RX ADMIN — Medication 1 TABLET(S): at 13:21

## 2021-12-02 RX ADMIN — ESCITALOPRAM OXALATE 5 MILLIGRAM(S): 10 TABLET, FILM COATED ORAL at 13:21

## 2021-12-02 RX ADMIN — Medication 2000 UNIT(S): at 13:21

## 2021-12-02 RX ADMIN — Medication 12.5 MICROGRAM(S): at 06:08

## 2021-12-02 RX ADMIN — ENOXAPARIN SODIUM 40 MILLIGRAM(S): 100 INJECTION SUBCUTANEOUS at 13:21

## 2021-12-02 RX ADMIN — Medication 800 MILLIGRAM(S): at 17:10

## 2021-12-02 NOTE — DIETITIAN NUTRITION RISK NOTIFICATION - ADDITIONAL COMMENTS/DIETITIAN RECOMMENDATIONS
Change diet to full fluid; mildly thick liquids.  Ensure Enlive (mildly thick) to optimize po intake and provide an additional 350 kcal, 20g protein per serving.  Recommend SLP swallow evaluation prior to diet advancement.   Rx: MVI and vitamin C 500mg daily as feasible.   Bowel regimen PRN.   Obtain daily weights to monitor trends.

## 2021-12-02 NOTE — PROGRESS NOTE ADULT - ASSESSMENT
Patient is a 61y old female with severe developmental delay, nonverbal at baseline transferred for sigmoid volvulus s/p sigmoid decompression. Due to likelihood of recurrence, surgical intervention recommended. Now s/p open sigmoidectomy with ostomy creation     PLAN:    - Pain control MMD   - Monitor ostomy o/p   - ADAT, consider speech and swallow eval   - Monitor lytes, replete PRN   - Remainder of care per primary team

## 2021-12-02 NOTE — DIETITIAN INITIAL EVALUATION ADULT. - HEIGHT FOR BMI (CENTIMETERS)
END OF SHIFT NOTE:    Intake/Output  09/21 0701 - 09/21 1900  In: 117 [I.V.:549]  Out: 900 [Urine:900]   Voiding: YES  Catheter: NO  Drain:              Stool:  0 occurrences. Stool Assessment  Stool Color: Brown (09/19/17 2332)  Stool Appearance: Loose; Soft (09/19/17 2332)  Stool Amount: Small (09/19/17 2332)  Stool Source/Status: Rectum (09/19/17 2332)    Emesis:  0 occurrences. VITAL SIGNS  Patient Vitals for the past 12 hrs:   Temp Pulse Resp BP SpO2   09/21/17 1625 98.6 °F (37 °C) (!) 101 17 159/73 97 %   09/21/17 1524 - 97 17 135/65 96 %   09/21/17 1508 - 99 16 136/61 96 %   09/21/17 1453 - (!) 105 15 147/69 97 %   09/21/17 1438 - (!) 109 16 150/69 98 %   09/21/17 1433 - (!) 110 17 161/72 98 %   09/21/17 1428 - (!) 106 16 148/70 98 %   09/21/17 1425 - (!) 108 15 166/71 97 %   09/21/17 1420 - (!) 101 15 169/78 98 %   09/21/17 1415 - (!) 109 14 172/81 97 %   09/21/17 1323 - 99 16 164/74 95 %   09/21/17 1050 97.3 °F (36.3 °C) 94 18 142/64 97 %   09/21/17 0711 97.8 °F (36.6 °C) 94 18 141/68 96 %   09/21/17 0635 97.6 °F (36.4 °C) 94 18 (!) 147/95 95 %       Pain Assessment  Pain 1  Pain Scale 1: Numeric (0 - 10) (09/21/17 1659)  Pain Intensity 1: 5 (09/21/17 1659)  Patient Stated Pain Goal: 0 (09/21/17 0303)  Pain Reassessment 1: Yes (09/21/17 1659)  Pain Onset 1: ongoing (09/21/17 1323)  Pain Location 1: Back; Abdomen (09/21/17 1604)  Pain Orientation 1: Upper (upperback) (09/20/17 1607)  Pain Description 1: Aching (09/21/17 1604)  Pain Intervention(s) 1: Medication (see MAR) (09/21/17 1604)    Ambulating  Yes    Additional Information: Pain management with [place]. [place] for throat and cough. Uses walker to ambulate. VSS. No needs at present. Hold Xarelto until 9/22 PM.    Shift report given to oncoming nurse at the bedside.     Antonietta Machado [phone]

## 2021-12-02 NOTE — PROGRESS NOTE ADULT - SUBJECTIVE AND OBJECTIVE BOX
Colorectal Surgery Progress Note:    Patient had a prima-fit placed for urinary output measurement.  Around 2300 patient had urinary output but it was unable to caught by prima-fit device (unknown amount).      Otherwise, no acute overnight events. Patient afebrile, VSS. Pain seems to be well controlled (patient unable to provide subjective report). Tolerating clear liquid diet. Denies n/v/f/c/cp/sob.     Diet, Clear Liquid:   Supplement Feeding Modality:  Oral  Ensure Clear Cans or Servings Per Day:  1       Frequency:  Three Times a day (12-01-21 @ 13:12)      Scheduled Medications:   cholecalciferol 2000 Unit(s) Oral daily  dextrose 40% Gel 15 Gram(s) Oral once  dextrose 5% + lactated ringers 1000 milliLiter(s) (100 mL/Hr) IV Continuous <Continuous>  dextrose 5%. 1000 milliLiter(s) (100 mL/Hr) IV Continuous <Continuous>  dextrose 5%. 1000 milliLiter(s) (50 mL/Hr) IV Continuous <Continuous>  dextrose 50% Injectable 25 Gram(s) IV Push once  dextrose 50% Injectable 12.5 Gram(s) IV Push once  dextrose 50% Injectable 25 Gram(s) IV Push once  enoxaparin Injectable 40 milliGRAM(s) SubCutaneous daily  escitalopram 5 milliGRAM(s) Oral daily  fluticasone propionate 50 MICROgram(s)/spray Nasal Spray 1 Spray(s) Both Nostrils every 12 hours  glucagon  Injectable 1 milliGRAM(s) IntraMuscular once  insulin lispro (ADMELOG) corrective regimen sliding scale   SubCutaneous every 6 hours  ketorolac   Injectable 15 milliGRAM(s) IV Push every 6 hours  lactobacillus acidophilus 1 Tablet(s) Oral daily  levothyroxine Injectable 12.5 MICROGram(s) IV Push <User Schedule>  loratadine 10 milliGRAM(s) Oral daily  mesalamine DR Capsule 800 milliGRAM(s) Oral every 12 hours  mesalamine ER Capsule 375 milliGRAM(s) Oral daily  polyethylene glycol 3350 17 Gram(s) Oral daily  potassium chloride  10 mEq/100 mL IVPB 10 milliEquivalent(s) IV Intermittent every 1 hour  potassium chloride  10 mEq/100 mL IVPB 10 milliEquivalent(s) IV Intermittent every 1 hour    PRN Medications:  ALBUTerol    90 MICROgram(s) HFA Inhaler 2 Puff(s) Inhalation every 6 hours PRN Shortness of Breath and/or Wheezing  aluminum hydroxide/magnesium hydroxide/simethicone Suspension 30 milliLiter(s) Oral every 4 hours PRN Dyspepsia  melatonin 3 milliGRAM(s) Oral at bedtime PRN Insomnia      Objective:   T(F): 97.5 (12-02 @ 00:04), Max: 98 (12-01 @ 15:30)  HR: 75 (12-02 @ 00:04) (72 - 90)  BP: 111/70 (12-02 @ 00:04) (101/67 - 116/74)  BP(mean): --  ABP: --  ABP(mean): --  RR: 18 (12-02 @ 00:04) (18 - 18)  SpO2: 91% (12-02 @ 00:04) (91% - 95%)      Physical Exam:   GEN: patient resting in bed, in no acute distress  RESP: respirations are unlabored, no accessory muscle use  GI: Abdomen soft, non-tender, non-distended, no rebound tenderness / guarding; stoma pink and patient with continued gas in bag but without fluid output  MSK: all extremities contracted to chest, as per baseline    I&O's    11-30 @ 07:01  -  12-01 @ 07:00  --------------------------------------------------------  IN:    dextrose 5% + lactated ringers: 840 mL  Total IN: 840 mL    OUT:    Colostomy (mL): 50 mL    Indwelling Catheter - Urethral (mL): 550 mL    Oral Fluid: 0 mL  Total OUT: 600 mL    Total NET: 240 mL      12-01 @ 07:01  -  12-02 @ 01:54  --------------------------------------------------------  IN:  Total IN: 0 mL    OUT:    Colostomy (mL): 100 mL    Indwelling Catheter - Urethral (mL): 0 mL  Total OUT: 100 mL    Total NET: -100 mL          LABS:                        10.3   9.63  )-----------( 233      ( 01 Dec 2021 09:58 )             31.8     12-01    149<H>  |  116<H>  |  22.9<H>  ----------------------------<  126<H>  3.2<L>   |  24.0  |  0.56    Ca    8.0<L>      01 Dec 2021 09:58  Phos  4.1     11-30  Mg     2.1     12-01            MICROBIOLOGY:       PATHOLOGY:

## 2021-12-02 NOTE — DIETITIAN INITIAL EVALUATION ADULT. - PERTINENT LABORATORY DATA
12-01 Na149 mmol/L<H> Glu 126 mg/dL<H> K+ 3.2 mmol/L<L> Cr  0.56 mg/dL BUN 22.9 mg/dL<H> Phos n/a   Alb n/a   PAB n/a

## 2021-12-02 NOTE — DIETITIAN INITIAL EVALUATION ADULT. - ADD RECOMMEND
Recommend SLP swallow evaluation prior to diet advancement. Rx: MVI and vitamin C 500mg daily as feasible. Bowel regimen PRN. Obtain daily weights to monitor trends.

## 2021-12-02 NOTE — DIETITIAN INITIAL EVALUATION ADULT. - LAB (SPECIFY)
[facility] [place]    Pediatrics General Progress Note    Date of Service (when Attending saw the patient): 2017    Interval History   Small mid-feed spit up this AM 0630. Extremely gassy per nursing notes. No BM in greater than 24 hours, so RN gave suppository.       Assessment & Plan   Medical Student Note Resident Note   Assessment and Plan (Student)    Assessment:  Sally Booker is a 4 month old former preemie (29&5/7) with a history of respiratory distress syndrome, ASD and VSD not requiring repair, retrognathia s/p jaw distractors in August, and feeding difficulties who was transferred from [facility] for a second opinion regarding feeding strategies / gtube. Requiring 65% volume via NG with demonstrated 60g weight gain in 3 days on current regimen of 115-125kcal/kg. .     Plan:  Failure to thrive due to feeding difficulties: repeat eval after [place]. Concern for increasing oral aversion due to painful, difficult feeds 2/2 jaw distraction. Speech suggesting feeds for pleasure only. Weight 60g weight gain since admission (4.81 on 9/23, 4.87 on 9/26). VSS found to have severe oral dysphagia with aerophagia 2/2 posterior tongue and reduced mandibular ROM. Likely to require G-tube for nutrition needs.  - appreciate Nutrition recs: cont pta simethicone and zinc, cont fe, 93mL Neosure Q3 hours, PO first then remaining volume via Gtube  - appreciate PT consultation: Developmental delay, PT 3x/wk, parent education regarding birth to 3 services and developmental stages  - appreciate OT consult: birth to 3 services, OT 2-3x/wk for 2 wks for fine motor skills/visual engagement  - appreciate speech consultation: video swallow study showing delay in swallow & no aspiration , positional changes/feeding instructions:  -Warm-up sucking skills on QUIRINO or Nuk pacifier (orthodontic/flat shape)  -Use QUIRINO bottle with level 1 nipple  -Swaddle to provide stability and soothing.  -Position patient on  "her side (ear, shoulder, hip all in a line) to support respiration while feeding.  -Give cheek support if she tolerates  -Aim for positive feeding experience, not volume     Oral candidiasis: Persistent plaque posterior palate   - Nystatin suspension QID until resolved    Neck fold candidiasis: erythematous linear area in neck fold with white debris characteristic of candida in setting of oral candidiasis and emesis.    - Nystatin ointment PRN      Retrognathia s/p jaw distractor displacement: Placed 8/15/17. Plan for removal 2-3 months. Completed 14 days abx (completed 9/6/17) for device infection.   - Clean with mupirocin application TID   - Appreciate ENT consult: Multifactorial cause of oral dysphagia. If g-tube placement in [facility] system, ENT to coordinate DL. ENT happy to follow as OP.     Social determinants  Mom feels that she is able to feed adequately PO with no additional medical interventions and has set a goal of discharge by Friday with no NG or G-tube. Mom's discontent with the [facility] system precluded conversations in the past. Continued conversations with mom and [facility] team have built some trust and rapport with this hospital system. Pt likely to need G-Tube. Mom's concern for Sally going home with \"tubes\" stems from concern that they would be pulled out given her social situation (2 year old sib, family that \"doesn't listen\"), however she is willing to explore this as an option. Concern for distance to [facility] services for OP follow-up, however mom strongly apposed to returning to [place].    - NG tube feed and care education   - Maintain thorough communication with mother   - Provide handouts on the care of premature infant / child, retrognathia, and gtube.    - Evaluate feasibility of outpatient follow-up (establish care with PCP, PT 3x/week, OT 2-3x/week x2weeks, Ophthalmology evaluation, GI / surgery)       FEN: try po with each feed for pleasure only, q 3 hrs, gavage remainder to meet " goal (Neosure per above)  Disposition Plan: To home or [place] tomorrow pending conversation and NG teaching with parents.    Sally Booker is a 4 month old female who is a former 29 week preemie with a history of RDS, CLD, and ASD and VSD not requiring repair post natally. Additionally she had congential retrognathia which was treated at [place] with jaw detraction surgery on 2017. Pre-operatively she was taking ~80% of feeds PO but had significant feeding difficulties. Post operatively difficulties increased and she was worked up by at [place] for possible oral aversion secondary to pain and poor suck/swallow. She was transferred to our care on 2017 for a second opinion and investigation of failure to thrive in the setting of low caloric intake. At this time Mother was frustrated about care at [facility] as she felt that she was not listened to and kept up to date on her daughters care. On admission, she stated that her daughter was eating enough and also potentially overweight and at risk for diabetes. Since admission Sally has had very poor oral intake with ~10% PO intake and is currently <3rd%ile on the growth curve. Speech and OT feel her poor feeding is likely related to pain and oral aversion and at this time Sally can be managed as an outpatient. She is a candidate for G tube or NG insertion in order to provide adequate nutrition during OT/speech therapy to treat her oral aversion. Family at this time has agreed to discharge with NG tube. They have elected to fully transfer all specialty care to  system, will need extensive care coordination and support during this process.      Changes today:   -Nystatin cream for neck rash  -Oral nystatin for thrush, gentian violet d/c  -Family to arrive tomorrow AM to learn feeding schedule and tube education prior to discharge   -Care coordination involved for help with specialty clinics.    #Failure to thrive- due to poor po intake: Per [facility] records, Sally was trialed  on several oral feeding challenges and was unable to maintain volume goals orally. Her weight gain trajectory dipped during these challenges as well. She is now on Q3 PO/NG feedings to meet volume goals, and the team at [place] discussed placing a G-tube.  Previously family stated they did not want a g-tube or NG placed, but have reconsidered this option as a bridge to full oral feeds with OT therapy. Will need NG to discharge to ensure adequate nutrition. Will need outpatient consultation with surgery for G tube insertion. Mother has agreed to d/c home with NG tube  - Evaluations from RD, SLP, PT, OT, appreciate recommendations re: feeding goals       -Per speech does not aspirate with swallowing       -Likely oral aversion, should feed only for                 pleasure with nutrition supplied via NG or G tube.  - Currently on, Similac NeoSure 24 kcal/oz, 93 mL Q3 hours (offer bottle first, then complete volume via NG tube). Observe for weight gain  - Strict I&Os  - Daily weights  - Continue PTA simethicone QID  - Poly vi sol to iron 1.5 mg/kg/day  - Continue zinc sulfate daily  - Consider genetics consult at some point given dysconjugate gaze and retrognathia - outpatient  - Per outside records, intraoperative upper GI was normal      #Retrognathia s/p jaw distractor placement: distractors placed 8/15/17, planned removal in 2-3 months with plastic surgery. She did have concern for device infection in August and she completed 14 days of antibiotics on 9/6/17.   - Continue PTA regimen of TID cleaning and topical mupirocin  - Will need removal in 2-3 months and mother would like to do this with a [facility] surgeon - will need ENT approval  -ENT service is amicable to assist in removal of external device when necessary, no immediate intervention needed at this time      #Oral thrush:  - Oral Nystatin suspension 4x/day  --d/c gentian violet     #Neck rash -  Likely related to skin moisture and spitup  -Nystatin cream  "PRN      #Dysconjugate gaze:  - Consider ophthalmology consult or outpatient follow up      Dispo: Likely 9/27 pending family agreement to tube feeding and outpatient OT/speech therapies    Physical Exam (Student)  Temp:  [97.8  F (36.6  C)-98.4  F (36.9  C)] 98.4  F (36.9  C)  Heart Rate:  [120-162] 140  Resp:  [40-55] 43  BP: ()/(46-74) 97/51  SpO2:  [98 %-100 %] 99 %     Constitutional: Well appearing infant sleeping in bed  HEENT: Normocephalic with normal anterior fontanelle, Disconjugate gaze, anicteric. NG tube in R nare, L nare patent, collapsed bridge of nose. Pins bilateral jaw. Palate in tact, MMM (stained blue from gentian violet).   Neck: Erythematous linear area in neck fold. No satellite lesions, no involvement on implant, no pustules or papules. No adenopathy  Respiratory: Clear to auscultation bilaterally, no grunting, flaring, retractions or stridor  Cardiovascular: Normal S1/S2, no murmurs appreciated.  GI: Normal bowel sounds, soft, non-tender to palpation, no hepatosplenomegaly  Skin/Integumen: No rashes except as above in neck area  Neuro: Moves all limbs equally    Data Interpretation  I have reviewed today's vital signs, medications, labs and imaging which are significant for VSS no aspiration, slow swallow, normal VS.     Physical Exam (Resident)  BP 97/51  Pulse 134  Temp 98.4  F (36.9  C) (Axillary)  Resp (!) 43  Ht 0.52 m (1' 8.47\")  Wt 4.91 kg (10 lb 13.2 oz)  HC 37 cm (14.57\")  SpO2 99%  BMI 18.16 kg/m2    Constitutional: Sleeping comfortably, awakens with exam, fussy on exam  HEENT: normocephalic, atraumatic, anterior fontanelle flat, disconjugate gaze. Nares patent, no rhinorhea, no cleft palate. Pins in bilateral jaw, MMM with blue tinge from gentian violet.   Respiratory: lungs clear throughout, no increased WOB, no retractions, wheezes, stridor or rhonchi  Cardiovascular: RRR, no murmurs rubs or gallops appreciated  GI: abdomen soft, non-tender, non distended. BS " present and normal  Genitourinary: normal female genitalia, Teto 1  Skin: Mild diaper rash covered wit Desitin, improved.. Candida plaques visible on upper palate. Erythematous area in right sided neck fold without satellite lesions. No other rashes or lesion  Musculoskeletal: moves extremities equally.   Neurologic: appears appropriate for age, difficult to assess    Data Interpretation  I have reviewed today's vital signs and medications: No laboratory tests ordered today.   Oliva Kuhn MS3 2017 8:37 AM Kelsy Bhatt MD  PL1 - Pediatrics  [facility]  pager [phone]   2017 4:05 PM     Medications        nystatin  100,000 Units Oral 4x Daily     ferrous sulfate  1.5 mg/kg/day Oral Daily     zinc sulfate  22 mg Oral Daily     mupirocin   Topical TID     simethicone  20 mg Oral 4x Daily       Data   No lab results found in last 7 days.   Temp:  [97.8  F (36.6  C)-98.4  F (36.9  C)] 98.4  F (36.9  C)  Heart Rate:  [120-162] 140  Resp:  [40-55] 43  BP: ()/(46-74) 97/51  SpO2:  [98 %-100 %] 99 %      Intake/Output Summary (Last 24 hours) at 09/27/17 0837  Last data filed at 09/27/17 0800   Gross per 24 hour   Intake            661.5 ml   Output              418 ml   Net            243.5 ml     I: 129 PO, 522 NG (20%PO)  O: 3.3 mL/kg/hr    Vitals:    09/23/17 1947 09/26/17 1224 09/27/17 1400   Weight: 4.81 kg (10 lb 9.7 oz) 4.87 kg (10 lb 11.8 oz) 4.91 kg (10 lb 13.2 oz)       No results found for this or any previous visit (from the past 24 hour(s)).        chem 8, mg, phos, H/H

## 2021-12-02 NOTE — DIETITIAN INITIAL EVALUATION ADULT. - ETIOLOGY
related to inability to meet sufficient protein-energy in setting of MR, dysphagia, now with sigmoid volvulus s/p open sigmoidectomy with ostomy creation related to inability to meet sufficient protein-energy in setting of MR, dysphagia, poor skin integrity, now with sigmoid volvulus s/p open sigmoidectomy with ostomy creation

## 2021-12-02 NOTE — DIETITIAN INITIAL EVALUATION ADULT. - ORAL NUTRITION SUPPLEMENTS
Ensure Enlive (mildly thick) to optimize po intake and provide an additional 350 kcal, 20g protein per serving.

## 2021-12-02 NOTE — DIETITIAN INITIAL EVALUATION ADULT. - PERTINENT MEDS FT
MEDICATIONS  (STANDING):  cholecalciferol 2000 Unit(s) Oral daily  dextrose 40% Gel 15 Gram(s) Oral once  dextrose 5% + lactated ringers 1000 milliLiter(s) (100 mL/Hr) IV Continuous <Continuous>  dextrose 5%. 1000 milliLiter(s) (50 mL/Hr) IV Continuous <Continuous>  dextrose 5%. 1000 milliLiter(s) (100 mL/Hr) IV Continuous <Continuous>  dextrose 50% Injectable 25 Gram(s) IV Push once  dextrose 50% Injectable 12.5 Gram(s) IV Push once  dextrose 50% Injectable 25 Gram(s) IV Push once  enoxaparin Injectable 40 milliGRAM(s) SubCutaneous daily  escitalopram 5 milliGRAM(s) Oral daily  fluticasone propionate 50 MICROgram(s)/spray Nasal Spray 1 Spray(s) Both Nostrils every 12 hours  glucagon  Injectable 1 milliGRAM(s) IntraMuscular once  insulin lispro (ADMELOG) corrective regimen sliding scale   SubCutaneous every 6 hours  ketorolac   Injectable 15 milliGRAM(s) IV Push every 6 hours  lactobacillus acidophilus 1 Tablet(s) Oral daily  levothyroxine Injectable 12.5 MICROGram(s) IV Push <User Schedule>  loratadine 10 milliGRAM(s) Oral daily  mesalamine DR Capsule 800 milliGRAM(s) Oral every 12 hours  mesalamine ER Capsule 375 milliGRAM(s) Oral daily  polyethylene glycol 3350 17 Gram(s) Oral daily  potassium chloride  10 mEq/100 mL IVPB 10 milliEquivalent(s) IV Intermittent every 1 hour  potassium chloride  10 mEq/100 mL IVPB 10 milliEquivalent(s) IV Intermittent every 1 hour    MEDICATIONS  (PRN):  ALBUTerol    90 MICROgram(s) HFA Inhaler 2 Puff(s) Inhalation every 6 hours PRN Shortness of Breath and/or Wheezing  aluminum hydroxide/magnesium hydroxide/simethicone Suspension 30 milliLiter(s) Oral every 4 hours PRN Dyspepsia  melatonin 3 milliGRAM(s) Oral at bedtime PRN Insomnia

## 2021-12-02 NOTE — DIETITIAN NUTRITION RISK NOTIFICATION - INADEQUATE ENERGY INTAKE
rheumatologist at [place] briefly, otherwise saw Lindy Barba at [facility] [id number] IgM + </= 50% for >/= 5 days

## 2021-12-02 NOTE — PROGRESS NOTE ADULT - ATTENDING COMMENTS
Patient was seen and examined at bedside. Patient is doing well s/p Bruce's for sigmoid volvulus. Abdomen is soft, appropriate, nondistended, ostomy is pink patent and functioning of air and sweat. No subjective information can be obtained. Advance to pureed diet, follow up morning labs and correct electrolytes as needed. We will speak wit case management to begin discharge planing.

## 2021-12-02 NOTE — DIETITIAN INITIAL EVALUATION ADULT. - OTHER INFO
61 year old female with severe developmental delay, nonverbal at baseline transferred for sigmoid volvulus s/p sigmoid decompression. Now s/p open sigmoidectomy with ostomy creation. Pt unable to provide nutrition hx. Per transfer sheet, pt receives a pureed diet with nectar thick liquids at group home. Noted with poor appetite/po intake. Pt has been NPO/clears since admission, diet advanced to fulls with morning, pt consumed 0% per tray observation. Limited NFPE conducted. RD to follow up. 61 year old female with severe developmental delay, nonverbal at baseline transferred for sigmoid volvulus s/p sigmoid decompression. Now s/p open sigmoidectomy with ostomy creation. Pt unable to provide nutrition hx. Per transfer sheet, pt receives a pureed diet with nectar thick liquids at group home. Noted with poor appetite/po intake. Pt has been NPO/clears since admission, diet advanced to fulls with morning, pt consumed 0% per tray observation. Noted with stage I pressure injury to sacrum per flow sheet. Limited NFPE conducted. RD to follow up.

## 2021-12-03 ENCOUNTER — TRANSCRIPTION ENCOUNTER (OUTPATIENT)
Age: 61
End: 2021-12-03

## 2021-12-03 LAB
ANION GAP SERPL CALC-SCNC: 10 MMOL/L — SIGNIFICANT CHANGE UP (ref 5–17)
ANION GAP SERPL CALC-SCNC: 7 MMOL/L — SIGNIFICANT CHANGE UP (ref 5–17)
BUN SERPL-MCNC: 15.7 MG/DL — SIGNIFICANT CHANGE UP (ref 8–20)
BUN SERPL-MCNC: 19 MG/DL — SIGNIFICANT CHANGE UP (ref 8–20)
CALCIUM SERPL-MCNC: 8.1 MG/DL — LOW (ref 8.6–10.2)
CALCIUM SERPL-MCNC: 8.5 MG/DL — LOW (ref 8.6–10.2)
CHLORIDE SERPL-SCNC: 118 MMOL/L — HIGH (ref 98–107)
CHLORIDE SERPL-SCNC: 118 MMOL/L — HIGH (ref 98–107)
CO2 SERPL-SCNC: 23 MMOL/L — SIGNIFICANT CHANGE UP (ref 22–29)
CO2 SERPL-SCNC: 27 MMOL/L — SIGNIFICANT CHANGE UP (ref 22–29)
CREAT SERPL-MCNC: 0.36 MG/DL — LOW (ref 0.5–1.3)
CREAT SERPL-MCNC: 0.43 MG/DL — LOW (ref 0.5–1.3)
GLUCOSE BLDC GLUCOMTR-MCNC: 103 MG/DL — HIGH (ref 70–99)
GLUCOSE BLDC GLUCOMTR-MCNC: 111 MG/DL — HIGH (ref 70–99)
GLUCOSE BLDC GLUCOMTR-MCNC: 82 MG/DL — SIGNIFICANT CHANGE UP (ref 70–99)
GLUCOSE BLDC GLUCOMTR-MCNC: 82 MG/DL — SIGNIFICANT CHANGE UP (ref 70–99)
GLUCOSE BLDC GLUCOMTR-MCNC: 92 MG/DL — SIGNIFICANT CHANGE UP (ref 70–99)
GLUCOSE SERPL-MCNC: 112 MG/DL — HIGH (ref 70–99)
GLUCOSE SERPL-MCNC: 96 MG/DL — SIGNIFICANT CHANGE UP (ref 70–99)
HCT VFR BLD CALC: 34.4 % — LOW (ref 34.5–45)
HGB BLD-MCNC: 11.1 G/DL — LOW (ref 11.5–15.5)
MAGNESIUM SERPL-MCNC: 1.9 MG/DL — SIGNIFICANT CHANGE UP (ref 1.8–2.6)
MCHC RBC-ENTMCNC: 28.8 PG — SIGNIFICANT CHANGE UP (ref 27–34)
MCHC RBC-ENTMCNC: 32.3 GM/DL — SIGNIFICANT CHANGE UP (ref 32–36)
MCV RBC AUTO: 89.1 FL — SIGNIFICANT CHANGE UP (ref 80–100)
PHOSPHATE SERPL-MCNC: 1.6 MG/DL — LOW (ref 2.4–4.7)
PLATELET # BLD AUTO: 288 K/UL — SIGNIFICANT CHANGE UP (ref 150–400)
POTASSIUM SERPL-MCNC: 3.6 MMOL/L — SIGNIFICANT CHANGE UP (ref 3.5–5.3)
POTASSIUM SERPL-MCNC: 4.1 MMOL/L — SIGNIFICANT CHANGE UP (ref 3.5–5.3)
POTASSIUM SERPL-SCNC: 3.6 MMOL/L — SIGNIFICANT CHANGE UP (ref 3.5–5.3)
POTASSIUM SERPL-SCNC: 4.1 MMOL/L — SIGNIFICANT CHANGE UP (ref 3.5–5.3)
RBC # BLD: 3.86 M/UL — SIGNIFICANT CHANGE UP (ref 3.8–5.2)
RBC # FLD: 14.4 % — SIGNIFICANT CHANGE UP (ref 10.3–14.5)
SODIUM SERPL-SCNC: 151 MMOL/L — HIGH (ref 135–145)
SODIUM SERPL-SCNC: 152 MMOL/L — HIGH (ref 135–145)
WBC # BLD: 9.59 K/UL — SIGNIFICANT CHANGE UP (ref 3.8–10.5)
WBC # FLD AUTO: 9.59 K/UL — SIGNIFICANT CHANGE UP (ref 3.8–10.5)

## 2021-12-03 RX ORDER — SODIUM CHLORIDE 9 MG/ML
1000 INJECTION, SOLUTION INTRAVENOUS
Refills: 0 | Status: DISCONTINUED | OUTPATIENT
Start: 2021-12-03 | End: 2021-12-08

## 2021-12-03 RX ORDER — SODIUM CHLORIDE 9 MG/ML
1000 INJECTION, SOLUTION INTRAVENOUS
Refills: 0 | Status: DISCONTINUED | OUTPATIENT
Start: 2021-12-03 | End: 2021-12-04

## 2021-12-03 RX ORDER — POTASSIUM PHOSPHATE, MONOBASIC POTASSIUM PHOSPHATE, DIBASIC 236; 224 MG/ML; MG/ML
30 INJECTION, SOLUTION INTRAVENOUS ONCE
Refills: 0 | Status: COMPLETED | OUTPATIENT
Start: 2021-12-03 | End: 2021-12-04

## 2021-12-03 RX ADMIN — ENOXAPARIN SODIUM 40 MILLIGRAM(S): 100 INJECTION SUBCUTANEOUS at 11:35

## 2021-12-03 RX ADMIN — LORATADINE 10 MILLIGRAM(S): 10 TABLET ORAL at 11:36

## 2021-12-03 RX ADMIN — SODIUM CHLORIDE 120 MILLILITER(S): 9 INJECTION, SOLUTION INTRAVENOUS at 17:39

## 2021-12-03 RX ADMIN — Medication 2000 UNIT(S): at 11:36

## 2021-12-03 RX ADMIN — Medication 1 SPRAY(S): at 18:14

## 2021-12-03 RX ADMIN — Medication 800 MILLIGRAM(S): at 18:14

## 2021-12-03 RX ADMIN — Medication 800 MILLIGRAM(S): at 05:40

## 2021-12-03 RX ADMIN — Medication 12.5 MICROGRAM(S): at 05:39

## 2021-12-03 RX ADMIN — POLYETHYLENE GLYCOL 3350 17 GRAM(S): 17 POWDER, FOR SOLUTION ORAL at 11:35

## 2021-12-03 RX ADMIN — Medication 1 TABLET(S): at 11:35

## 2021-12-03 RX ADMIN — ESCITALOPRAM OXALATE 5 MILLIGRAM(S): 10 TABLET, FILM COATED ORAL at 11:36

## 2021-12-03 RX ADMIN — Medication 1 SPRAY(S): at 05:44

## 2021-12-03 NOTE — DISCHARGE NOTE NURSING/CASE MANAGEMENT/SOCIAL WORK - NSDCPEFALRISK_GEN_ALL_CORE
For information on Fall & Injury Prevention, visit: [email or web address] OR  [email or web address] OR  https://www.cdc.gov/steadi/patient.html

## 2021-12-03 NOTE — DISCHARGE NOTE PROVIDER - CARE PROVIDERS DIRECT ADDRESSES
[email or web address]

## 2021-12-03 NOTE — ADVANCED PRACTICE NURSE CONSULT - ASSESSMENT
Patient is non-verbal, severe developmental delay and unable to participate in care or learn ostomy appliance care.  Patient appears comfortable with no visible/audible signs of pain/distress.  Patient s/p sigmoidectomy with colostomy creation on 11/29    ·	Colostomy - Stoma (41mm), red, budded, moist stoma, 3cm protrusion, with moderate semi-formed effluence noted in the ostomy pouch approx. 500mL) with appropriate effluent odor.  Peristomal skin erythema, moist intact.

## 2021-12-03 NOTE — PROGRESS NOTE ADULT - ATTENDING COMMENTS
Patient was seen and examined at bedside. Patient is doing well s/p Bruce's for sigmoid volvulus. Abdomen is soft, appropriate, nondistended, ostomy is pink patent and functioning of air and sweat. No subjective information can be obtained. Advance to pureed diet, follow up morning labs and correct electrolytes as needed. We will speak wit case management to begin discharge planing. Seen and examined.  No acute overnight issues.  AFVSS  NAD  incision CDI, colostomy healthy and productive, soft, NTND  Labs reviewed  POD 4  OK to d/c back to nursing facility.

## 2021-12-03 NOTE — DISCHARGE NOTE PROVIDER - PROVIDER TOKENS
PROVIDER:[TOKEN:[[id number]],FOLLOWUP:[2 weeks]] PROVIDER:[TOKEN:[[id number]],FOLLOWUP:[2 weeks]],PROVIDER:[TOKEN:[[id number]],FOLLOWUP:[1 week]]

## 2021-12-03 NOTE — DISCHARGE NOTE PROVIDER - HOSPITAL COURSE
61 year old female with pmh of autism, cataracts, constipation, nonverbal at baseline with mental retardation, osteoporosis, hypothyroidism coming to hospital after being transferred from [facility] [place] for sigmoid volvulus. patient unable to provided history given non verbal w/ mental retardation. seen by surgery and GI for flex sigmoidoscopy today via GI for reduction of volvulus. at baseline patient tolerating puree with nectar thick diet. per chart patient was transfer to [facility] [place] given abdominal distention with agitation and respiratory depression after dinner.    She was discovered to have a volvulus on this presentation after work up with a CT showing Markedly primarily air dilated colon suggestive of but not definitive for volvulus as described above. Marked dilatation of the colon and redundancy with resultant crowding of organs and patient positioning markedly limits evaluation. There is no colonic wall thickening.    The patient underwent a colonic sigmoid decompression, however she did not progress and required a sigmoidectomy and end colostomy.  She did well post operatively, tolerated diet. Discharged back to facility.    . 61 year old female with pmh of autism, cataracts, constipation, nonverbal at baseline with mental retardation, osteoporosis, hypothyroidism coming to hospital after [place] [facility] [place]moid [place]. patient unable to provided history given non verbal w/ mental retardation. seen by surgery and GI for flex sigmoidoscopy today via GI for reduction of volvulus. at baseline patient tolerating puree with nectar thick diet. per chart patient [place] [facility] [place] abdominal distention with agitation and respiratory depression after dinner.    She was discovered to have a volvulus on this presentation after work up with a CT showing Markedly primarily air dilated colon suggestive of but not definitive for volvulus as described above. Marked dilatation of the colon and redundancy with resultant crowding of organs and patient positioning markedly limits evaluation. There is no colonic wall thickening.    The patient underwent a colonic sigmoid decompression, however she did not progress and required a sigmoidectomy and end colostomy.  Post operatively she had a complicated post operative course.  She had issues tolerated her pureed moderately thick diet, required tube feed supplementation and developed ascites and a pneumoperitoneum post op with repeat imaging with no isolated areas of perforation.  She had drains placed for ascites by IR which grew enterococcus fecalis requiring antibiotic directed therapy.  She will be discharged on antibiotics and will have a repeat CT A/P with PO contrast as a outpatient to evaluated ascites and for resolution of the pneumoperitoneum.  She will have close follow up, a week of additional antibiotics and drain care as a outpatient.     61 year old female with pmh of autism, cataracts, constipation, nonverbal at baseline with mental retardation, osteoporosis, hypothyroidism coming to hospital after being transferred from [facility] [place] for sigmoid volvulus. patient unable to provided history given non verbal w/ mental retardation. seen by surgery and GI for flex sigmoidoscopy today via GI for reduction of volvulus. at baseline patient tolerating puree with nectar thick diet. per chart patient was transfer to [facility] [place] given abdominal distention with agitation and respiratory depression after dinner.    She was discovered to have a volvulus on this presentation after work up with a CT showing Markedly primarily air dilated colon suggestive of but not definitive for volvulus as described above. Marked dilatation of the colon and redundancy with resultant crowding of organs and patient positioning markedly limits evaluation. There is no colonic wall thickening.    The patient underwent a colonic sigmoid decompression, however she did not progress and required a sigmoidectomy and end colostomy.  Post operatively she had a complicated post operative course.  She had issues tolerated her pureed moderately thick diet, required tube feed supplementation and developed ascites and a pneumoperitoneum post op with repeat imaging with no isolated areas of perforation.  She had drains placed for ascites by IR which grew enterococcus fecalis requiring antibiotic directed therapy.  When output decreased the drains were removed. She will be discharged on antibiotics and will have a repeat CT A/P with PO contrast as a outpatient to evaluated ascites and for resolution of the pneumoperitoneum.  She will have close follow up, a week of additional antibiotics and drain care as a outpatient.     61 year old female with pmh of autism, cataracts, constipation, nonverbal at baseline with mental retardation, osteoporosis, hypothyroidism coming to hospital after being transferred from [facility] [place] for sigmoid volvulus. patient unable to provided history given non verbal w/ mental retardation. seen by surgery and GI for flex sigmoidoscopy today via GI for reduction of volvulus. at baseline patient tolerating puree with nectar thick diet. per chart patient was transfer to [facility] [place] given abdominal distention with agitation and respiratory depression after dinner.    She was discovered to have a volvulus on this presentation after work up with a CT showing Markedly primarily air dilated colon suggestive of but not definitive for volvulus as described above. Marked dilatation of the colon and redundancy with resultant crowding of organs and patient positioning markedly limits evaluation. There is no colonic wall thickening.    The patient underwent a colonic sigmoid decompression, however she did not progress and required a sigmoidectomy and end colostomy.  Post operatively she had a complicated post operative course.  She had issues tolerated her pureed moderately thick diet, required tube feed supplementation and developed ascites and a pneumoperitoneum post op with repeat imaging with no isolated areas of perforation.  She had drains placed for ascites by IR which grew enterococcus fecalis requiring antibiotic directed therapy.  When output decreased the drains were removed. She will be discharged on antibiotics and will have a repeat CT A/P with PO contrast as a outpatient , the week of 12/21-12/24, or sometime before your follow-up appointment in the next 2 weeks. to evaluated ascites and for resolution of the pneumoperitoneum.  She will have close follow up, a week of additional antibiotics and drain care as a outpatient.     61 year old female with pmh of autism, cataracts, constipation, nonverbal at baseline with mental retardation, osteoporosis, hypothyroidism coming to hospital after being transferred from [facility] for sigmoid volvulus. patient unable to provided history given non verbal w/ mental retardation. seen by surgery and GI for flex sigmoidoscopy today via GI for reduction of volvulus.  per chart [place] [facility] [place]ention with agitation and respiratory depression after dinner.    She was discovered to have a volvulus on this presentation after work up with a CT showing Markedly primarily air dilated colon suggestive of but not definitive for volvulus as described above. Marked dilatation of the colon and redundancy with resultant crowding of organs and patient positioning markedly limits evaluation. There is no colonic wall thickening.    The patient underwent a colonic sigmoid decompression, however she did not progress and required a sigmoidectomy and end colostomy.  Post operatively she had a complicated post operative course.  She had issues tolerated her pureed moderately thick diet, required tube feed supplementation and developed ascites and a pneumoperitoneum post op with repeat imaging with no isolated areas of perforation.  She had drains placed for ascites by IR which grew enterococcus fecalis requiring antibiotic directed therapy.  When output decreased the drains were removed. She will be discharged on antibiotics and will have a repeat CT A/P with PO contrast as a outpatient , the week of 12/21-12/24, or sometime before your follow-up appointment in the next 2 weeks. to evaluated ascites and for resolution of the pneumoperitoneum.  She will have close follow up, a week of additional antibiotics and drain care as a outpatient.    She will go home on a pureed honey diet.

## 2021-12-03 NOTE — PROGRESS NOTE ADULT - SUBJECTIVE AND OBJECTIVE BOX
Colorectal Surgery Progress Note:  Patient evaluated at bedside. No acute distress. No acute events over night. Patient non-verbal at baseline seems comfortable.     Diet, Clear Liquid:   Supplement Feeding Modality:  Oral  Ensure Clear Cans or Servings Per Day:  1       Frequency:  Three Times a day (12-01-21 @ 13:12)      Scheduled Medications:   cholecalciferol 2000 Unit(s) Oral daily  dextrose 40% Gel 15 Gram(s) Oral once  dextrose 5% + lactated ringers 1000 milliLiter(s) (100 mL/Hr) IV Continuous <Continuous>  dextrose 5%. 1000 milliLiter(s) (100 mL/Hr) IV Continuous <Continuous>  dextrose 5%. 1000 milliLiter(s) (50 mL/Hr) IV Continuous <Continuous>  dextrose 50% Injectable 25 Gram(s) IV Push once  dextrose 50% Injectable 12.5 Gram(s) IV Push once  dextrose 50% Injectable 25 Gram(s) IV Push once  enoxaparin Injectable 40 milliGRAM(s) SubCutaneous daily  escitalopram 5 milliGRAM(s) Oral daily  fluticasone propionate 50 MICROgram(s)/spray Nasal Spray 1 Spray(s) Both Nostrils every 12 hours  glucagon  Injectable 1 milliGRAM(s) IntraMuscular once  insulin lispro (ADMELOG) corrective regimen sliding scale   SubCutaneous every 6 hours  ketorolac   Injectable 15 milliGRAM(s) IV Push every 6 hours  lactobacillus acidophilus 1 Tablet(s) Oral daily  levothyroxine Injectable 12.5 MICROGram(s) IV Push <User Schedule>  loratadine 10 milliGRAM(s) Oral daily  mesalamine DR Capsule 800 milliGRAM(s) Oral every 12 hours  mesalamine ER Capsule 375 milliGRAM(s) Oral daily  polyethylene glycol 3350 17 Gram(s) Oral daily  potassium chloride  10 mEq/100 mL IVPB 10 milliEquivalent(s) IV Intermittent every 1 hour  potassium chloride  10 mEq/100 mL IVPB 10 milliEquivalent(s) IV Intermittent every 1 hour    PRN Medications:  ALBUTerol    90 MICROgram(s) HFA Inhaler 2 Puff(s) Inhalation every 6 hours PRN Shortness of Breath and/or Wheezing  aluminum hydroxide/magnesium hydroxide/simethicone Suspension 30 milliLiter(s) Oral every 4 hours PRN Dyspepsia  melatonin 3 milliGRAM(s) Oral at bedtime PRN Insomnia      Objective:   T(F): 97.5 (12-02 @ 00:04), Max: 98 (12-01 @ 15:30)  HR: 75 (12-02 @ 00:04) (72 - 90)  BP: 111/70 (12-02 @ 00:04) (101/67 - 116/74)  BP(mean): --  ABP: --  ABP(mean): --  RR: 18 (12-02 @ 00:04) (18 - 18)  SpO2: 91% (12-02 @ 00:04) (91% - 95%)      Physical Exam:   GEN: patient resting in bed, in no acute distress  RESP: respirations are unlabored, no accessory muscle use  GI: Abdomen soft, non-tender, non-distended, no rebound tenderness / guarding; stoma pink and patient with continued gas in bag but without fluid output  MSK: all extremities contracted to chest, as per baseline    I&O's    11-30 @ 07:01  -  12-01 @ 07:00  --------------------------------------------------------  IN:    dextrose 5% + lactated ringers: 840 mL  Total IN: 840 mL    OUT:    Colostomy (mL): 50 mL    Indwelling Catheter - Urethral (mL): 550 mL    Oral Fluid: 0 mL  Total OUT: 600 mL    Total NET: 240 mL      12-01 @ 07:01  -  12-02 @ 01:54  --------------------------------------------------------  IN:  Total IN: 0 mL    OUT:    Colostomy (mL): 100 mL    Indwelling Catheter - Urethral (mL): 0 mL  Total OUT: 100 mL    Total NET: -100 mL          LABS:                        10.3   9.63  )-----------( 233      ( 01 Dec 2021 09:58 )             31.8     12-01    149<H>  |  116<H>  |  22.9<H>  ----------------------------<  126<H>  3.2<L>   |  24.0  |  0.56    Ca    8.0<L>      01 Dec 2021 09:58  Phos  4.1     11-30  Mg     2.1     12-01            MICROBIOLOGY:       PATHOLOGY:

## 2021-12-03 NOTE — DISCHARGE NOTE PROVIDER - DETAILS OF MALNUTRITION DIAGNOSIS/DIAGNOSES
This patient has been assessed with a concern for Malnutrition and was treated during this hospitalization for the following Nutrition diagnosis/diagnoses:     -  12/02/2021: Severe protein-calorie malnutrition

## 2021-12-03 NOTE — ADVANCED PRACTICE NURSE CONSULT - RECOMMEDATIONS
·	Continue Ostomy care as directed  ·	Apply Cavilon skin barrier with wafer appliance changes  ·	Patient resides at long term facility, history provided by aid, if aid is available/ agreeable for ostomy education, please reconsult.

## 2021-12-03 NOTE — PROGRESS NOTE ADULT - ASSESSMENT
Patient is a 61y old female with severe developmental delay, nonverbal at baseline transferred for sigmoid volvulus s/p sigmoid decompression. Due to likelihood of recurrence, surgical intervention recommended. Now s/p open sigmoidectomy with ostomy creation. Patient progressing well with fuynctioning ostomy.     PLAN:    - Pain control MMD   - Monitor ostomy o/p   - Advance to pureed diet,  - Monitor lytes, replete PRN   - CM for dispo planning  - Remainder of care per primary team

## 2021-12-03 NOTE — DISCHARGE NOTE PROVIDER - NSDCFUADDINST_GEN_ALL_CORE_FT
No heavy lifting.    Continue ostomy care as instructed.   No heavy lifting.    Continue ostomy care as instructed.    Please organize CT Abdomen and Pelvis with PO contrast as a outpatient.   Please continue antibiotics as a outpatient for seven days.  Please maintain close follow up with your colorectal surgeon and infectious disease.   No heavy lifting.    Continue ostomy care as instructed.    Please organize CT Abdomen and Pelvis with PO contrast as a outpatient.   Please continue antibiotics as a outpatient for five days.  Please maintain close follow up with your colorectal surgeon and infectious disease.

## 2021-12-03 NOTE — DISCHARGE NOTE PROVIDER - CARE PROVIDER_API CALL
Eleazar De La Rosa)  ColonRectal Surgery; Surgery  [address] [address]  Phone: [phone]  Fax: [phone]  Follow Up Time: 2 weeks   Eleazar De La Rosa)  ColonRectal Surgery; Surgery  [address], [address]  [place] [address]  Phone: [phone]  Fax: [phone]  Follow Up Time: 2 weeks    Florida Bartlett)  Internal Medicine  [address] [place] [address]  Phone: [phone]  Fax: [phone]  Follow Up Time: 1 week

## 2021-12-03 NOTE — DISCHARGE NOTE PROVIDER - NSDCMRMEDTOKEN_GEN_ALL_CORE_FT
Acidophilus oral tablet: 1 tab(s) orally 2 times a day  albuterol 90 mcg/inh inhalation aerosol: 2 puff(s) inhaled every 6 hours  Calcium 600+D oral tablet: 1 tab(s) orally 2 times a day  docusate sodium 100 mg oral tablet: 1 tab(s) orally once a day  escitalopram 5 mg oral tablet: 1 tab(s) orally once a day  fexofenadine 180 mg oral tablet: 1 tab(s) orally once a day  fluticasone 50 mcg/inh inhalation powder: 1 puff(s) inhaled once a day  levothyroxine 25 mcg (0.025 mg) oral tablet: 1 tab(s) orally once a day  mesalamine 0.375 g oral capsule, extended release: 1 cap(s) orally once a day (in the morning)  mesalamine 800 mg oral delayed release tablet: 1 tab(s) orally 2 times a day  MiraLax oral powder for reconstitution: 17 gram(s) orally once a day  potassium chloride 10 mEq oral tablet, extended release: 1 tab(s) orally once a day  Prolia 60 mg/mL subcutaneous solution: 60 milligram(s) subcutaneous every 6 months  torsemide 20 mg oral tablet: 1 tab(s) orally once a day  Vitamin D3 50 mcg (2000 intl units) oral capsule: 1 cap(s) orally once a day   Acidophilus oral tablet: 1 tab(s) orally 2 times a day  albuterol 90 mcg/inh inhalation aerosol: 2 puff(s) inhaled every 6 hours  amoxicillin-clavulanate 875 mg-125 mg oral tablet: 1 tab(s) orally once a day   Calcium 600+D oral tablet: 1 tab(s) orally 2 times a day  escitalopram 5 mg oral tablet: 1 tab(s) orally once a day  fexofenadine 180 mg oral tablet: 1 tab(s) orally once a day  fluticasone 50 mcg/inh inhalation powder: 1 puff(s) inhaled once a day  levothyroxine 25 mcg (0.025 mg) oral tablet: 1 tab(s) orally once a day  mesalamine 0.375 g oral capsule, extended release: 1 cap(s) orally once a day (in the morning)  mesalamine 800 mg oral delayed release tablet: 1 tab(s) orally 2 times a day  MiraLax oral powder for reconstitution: 17 gram(s) orally once a day  Prolia 60 mg/mL subcutaneous solution: 60 milligram(s) subcutaneous every 6 months  torsemide 20 mg oral tablet: 1 tab(s) orally once a day  Vitamin D3 50 mcg (2000 intl units) oral capsule: 1 cap(s) orally once a day   Acidophilus oral tablet: 1 tab(s) orally 2 times a day  albuterol 90 mcg/inh inhalation aerosol: 2 puff(s) inhaled every 6 hours  amoxicillin-clavulanate 875 mg-125 mg oral tablet: 1 tab(s) orally every 12 hours   Calcium 600+D oral tablet: 1 tab(s) orally 2 times a day  escitalopram 5 mg oral tablet: 1 tab(s) orally once a day  fexofenadine 180 mg oral tablet: 1 tab(s) orally once a day  fluticasone 50 mcg/inh inhalation powder: 1 puff(s) inhaled once a day  levothyroxine 25 mcg (0.025 mg) oral tablet: 1 tab(s) orally once a day  mesalamine 0.375 g oral capsule, extended release: 1 cap(s) orally once a day (in the morning)  mesalamine 800 mg oral delayed release tablet: 1 tab(s) orally 2 times a day  MiraLax oral powder for reconstitution: 17 gram(s) orally once a day  Prolia 60 mg/mL subcutaneous solution: 60 milligram(s) subcutaneous every 6 months  torsemide 20 mg oral tablet: 1 tab(s) orally once a day  Vitamin D3 50 mcg (2000 intl units) oral capsule: 1 cap(s) orally once a day

## 2021-12-03 NOTE — DISCHARGE NOTE PROVIDER - NSDCCPCAREPLAN_GEN_ALL_CORE_FT
PRINCIPAL DISCHARGE DIAGNOSIS  Diagnosis: Sigmoid volvulus  Assessment and Plan of Treatment:        PRINCIPAL DISCHARGE DIAGNOSIS  Diagnosis: Sigmoid volvulus  Assessment and Plan of Treatment: WOUND CARE: PLease empty drain twice a day and record the outputs to notify surgeon at follow-up appointment.  You may place dry gauze and paper tape around the drain site daily.   BATHING: Please do not submerge wound underwater. You may shower and/or sponge bathe.   DIET: A pureed honey diet is recommended.  Resume all previous activities and medication. You will be discharged on 5 days og Augmentin (antibiotics) as prescribed).   NOTIFY YOUR SURGEON IF: You have any bleeding that does not stop, any pus draining from your wound(s), any fever (over 100.4 F) or chills, persistent nausea/vomiting, persistent diarrhea, or if your pain is not controlled on your discharge pain medications.  FOLLOW-UP: Please follow up with colorectal surgery in 2 weeks.         PRINCIPAL DISCHARGE DIAGNOSIS  Diagnosis: Sigmoid volvulus  Assessment and Plan of Treatment: WOUND CARE:  You may place dry gauze and paper tape to the RLQ drain site, or leave open to air if it is not draining.   BATHING: Please do not submerge wound underwater. You may shower and/or sponge bathe.   DIET: A pureed honey diet is recommended.  Resume all previous activities and medication. You will be discharged on 5 days og Augmentin (antibiotics) as prescribed).   NOTIFY YOUR SURGEON IF: You have any bleeding that does not stop, any pus draining from your wound(s), any fever (over 100.4 F) or chills, persistent nausea/vomiting, persistent diarrhea, or if your pain is not controlled on your discharge pain medications.  FOLLOW-UP: Please follow up with colorectal surgery in 2 weeks.         PRINCIPAL DISCHARGE DIAGNOSIS  Diagnosis: Sigmoid volvulus  Assessment and Plan of Treatment: WOUND CARE:  You may place dry gauze and paper tape to the RLQ drain site, or leave open to air if it is not draining.   BATHING: Please do not submerge wound underwater. You may shower and/or sponge bathe.   DIET: A pureed honey diet is recommended.  Resume all previous activities and medication. You will be discharged on 5 days og Augmentin (antibiotics) as prescribed).   NOTIFY YOUR SURGEON IF: You have any bleeding that does not stop, any pus draining from your wound(s), any fever (over 100.4 F) or chills, persistent nausea/vomiting, persistent diarrhea, or if your pain is not controlled on your discharge pain medications.  FOLLOW-UP: Please follow up with colorectal surgery in 2 weeks.  You will need your CT scan before  your follow-up apointment,

## 2021-12-03 NOTE — DISCHARGE NOTE NURSING/CASE MANAGEMENT/SOCIAL WORK - PATIENT PORTAL LINK FT
You can access the FollowMyHealth Patient Portal offered by [facility] by registering at the following website: [email or web address]. By joining [facility]’s FollowMyHealth portal, you will also be able to view your health information using other applications (apps) compatible with our system.

## 2021-12-04 DIAGNOSIS — E43 UNSPECIFIED SEVERE PROTEIN-CALORIE MALNUTRITION: ICD-10-CM

## 2021-12-04 LAB
ALBUMIN SERPL ELPH-MCNC: 2.1 G/DL — LOW (ref 3.3–5.2)
ANION GAP SERPL CALC-SCNC: 6 MMOL/L — SIGNIFICANT CHANGE UP (ref 5–17)
BASOPHILS # BLD AUTO: 0.01 K/UL — SIGNIFICANT CHANGE UP (ref 0–0.2)
BASOPHILS NFR BLD AUTO: 0.1 % — SIGNIFICANT CHANGE UP (ref 0–2)
BUN SERPL-MCNC: 12.1 MG/DL — SIGNIFICANT CHANGE UP (ref 8–20)
CALCIUM SERPL-MCNC: 8.1 MG/DL — LOW (ref 8.6–10.2)
CHLORIDE SERPL-SCNC: 116 MMOL/L — HIGH (ref 98–107)
CO2 SERPL-SCNC: 27 MMOL/L — SIGNIFICANT CHANGE UP (ref 22–29)
CREAT SERPL-MCNC: 0.36 MG/DL — LOW (ref 0.5–1.3)
EOSINOPHIL # BLD AUTO: 0.19 K/UL — SIGNIFICANT CHANGE UP (ref 0–0.5)
EOSINOPHIL NFR BLD AUTO: 2.3 % — SIGNIFICANT CHANGE UP (ref 0–6)
GLUCOSE BLDC GLUCOMTR-MCNC: 73 MG/DL — SIGNIFICANT CHANGE UP (ref 70–99)
GLUCOSE BLDC GLUCOMTR-MCNC: 80 MG/DL — SIGNIFICANT CHANGE UP (ref 70–99)
GLUCOSE BLDC GLUCOMTR-MCNC: 96 MG/DL — SIGNIFICANT CHANGE UP (ref 70–99)
GLUCOSE BLDC GLUCOMTR-MCNC: 96 MG/DL — SIGNIFICANT CHANGE UP (ref 70–99)
GLUCOSE BLDC GLUCOMTR-MCNC: 99 MG/DL — SIGNIFICANT CHANGE UP (ref 70–99)
GLUCOSE SERPL-MCNC: 118 MG/DL — HIGH (ref 70–99)
HCT VFR BLD CALC: 33.3 % — LOW (ref 34.5–45)
HGB BLD-MCNC: 10.9 G/DL — LOW (ref 11.5–15.5)
IMM GRANULOCYTES NFR BLD AUTO: 1.2 % — SIGNIFICANT CHANGE UP (ref 0–1.5)
LYMPHOCYTES # BLD AUTO: 0.97 K/UL — LOW (ref 1–3.3)
LYMPHOCYTES # BLD AUTO: 11.9 % — LOW (ref 13–44)
MAGNESIUM SERPL-MCNC: 1.9 MG/DL — SIGNIFICANT CHANGE UP (ref 1.6–2.6)
MCHC RBC-ENTMCNC: 28.8 PG — SIGNIFICANT CHANGE UP (ref 27–34)
MCHC RBC-ENTMCNC: 32.7 GM/DL — SIGNIFICANT CHANGE UP (ref 32–36)
MCV RBC AUTO: 87.9 FL — SIGNIFICANT CHANGE UP (ref 80–100)
MONOCYTES # BLD AUTO: 0.88 K/UL — SIGNIFICANT CHANGE UP (ref 0–0.9)
MONOCYTES NFR BLD AUTO: 10.8 % — SIGNIFICANT CHANGE UP (ref 2–14)
NEUTROPHILS # BLD AUTO: 6.01 K/UL — SIGNIFICANT CHANGE UP (ref 1.8–7.4)
NEUTROPHILS NFR BLD AUTO: 73.7 % — SIGNIFICANT CHANGE UP (ref 43–77)
PHOSPHATE SERPL-MCNC: 2.4 MG/DL — SIGNIFICANT CHANGE UP (ref 2.4–4.7)
PLATELET # BLD AUTO: 309 K/UL — SIGNIFICANT CHANGE UP (ref 150–400)
POTASSIUM SERPL-MCNC: 3.9 MMOL/L — SIGNIFICANT CHANGE UP (ref 3.5–5.3)
POTASSIUM SERPL-SCNC: 3.9 MMOL/L — SIGNIFICANT CHANGE UP (ref 3.5–5.3)
PREALB SERPL-MCNC: 8 MG/DL — LOW (ref 18–38)
RBC # BLD: 3.79 M/UL — LOW (ref 3.8–5.2)
RBC # FLD: 14.5 % — SIGNIFICANT CHANGE UP (ref 10.3–14.5)
SODIUM SERPL-SCNC: 149 MMOL/L — HIGH (ref 135–145)
WBC # BLD: 8.16 K/UL — SIGNIFICANT CHANGE UP (ref 3.8–10.5)
WBC # FLD AUTO: 8.16 K/UL — SIGNIFICANT CHANGE UP (ref 3.8–10.5)

## 2021-12-04 PROCEDURE — 99233 SBSQ HOSP IP/OBS HIGH 50: CPT

## 2021-12-04 RX ADMIN — Medication 12.5 MICROGRAM(S): at 06:03

## 2021-12-04 RX ADMIN — Medication 800 MILLIGRAM(S): at 06:01

## 2021-12-04 RX ADMIN — ENOXAPARIN SODIUM 40 MILLIGRAM(S): 100 INJECTION SUBCUTANEOUS at 12:09

## 2021-12-04 RX ADMIN — Medication 1 SPRAY(S): at 06:05

## 2021-12-04 RX ADMIN — POTASSIUM PHOSPHATE, MONOBASIC POTASSIUM PHOSPHATE, DIBASIC 83.33 MILLIMOLE(S): 236; 224 INJECTION, SOLUTION INTRAVENOUS at 10:14

## 2021-12-04 RX ADMIN — Medication 1 SPRAY(S): at 18:06

## 2021-12-04 RX ADMIN — SODIUM CHLORIDE 120 MILLILITER(S): 9 INJECTION, SOLUTION INTRAVENOUS at 06:05

## 2021-12-04 NOTE — PROGRESS NOTE ADULT - ASSESSMENT
Patient is a 61y old female with severe developmental delay, nonverbal at baseline transferred for sigmoid volvulus s/p sigmoid decompression. Due to likelihood of recurrence, surgical intervention recommended. Now s/p open sigmoidectomy with ostomy creation. Patient progressing well with fuynctioning ostomy.     PLAN:    - Pain control MMD   - Monitor ostomy o/p   - Continue puree diet for 24h then discharge to facility   - Monitor lytes, replete PRN

## 2021-12-04 NOTE — PROGRESS NOTE ADULT - PROBLEM SELECTOR PLAN 2
Patient with sigmoid volvulus, now s/p sigmoidectomy, colostomy placement  Patient refusing to eat. Her pre albumin 8.0  Plan same as above

## 2021-12-04 NOTE — PROGRESS NOTE ADULT - ATTENDING COMMENTS
Patient seen and examined at bedside. 61y old female with severe developmental delay, sigmoid volvulus s/p sigmoid decompression, now s/p surgical intervention --> open sigmoidectomy with ostomy creation. Patient appears in NAD, evaluated by speech pathologist and is not appropriate for PO intake. Patient will need alternate route for nutrition.  We will initiate IV nutrition and consult GI for evaluation and PEG-tube placement.

## 2021-12-04 NOTE — PROGRESS NOTE ADULT - ATTENDING COMMENTS
Initial consultation was performed by our group when she was first admitted and we signed off after she underwent surgery.  At this time her colostomy appears to be functioning well and there is no evidence of any issues that would prevent enteral feeds which is much safer and more physiologic than TPN.  As above would recommend Dobbhoff tube be placed and enteral feeds to be started.  If after for 5 days her mental status does not improve to the point where she can take orally once again she would probably require a gastrostomy tube but would need family consent.  She is not a candidate for TPN due to the above and to give TPN for only 3-4 or 5 days is an appropriate.

## 2021-12-04 NOTE — PROGRESS NOTE ADULT - SUBJECTIVE AND OBJECTIVE BOX
Chief Complaint: This is a 61y old woman patient being seen in follow-up consult for TPN vs gastrostomy tube placement.  Patient was originally seen by us during this admission for sigmoid volvulus for which she underwent K[place] sigmoid decompression and end colostomy on 11/29. Patient is now cleared to start oral intake, however patient is uncooperative and refused to eat. She was evaluated by Speech therapist and patient continue to be uncooperative with swallow evaluation. Gastroenterology is now reconsulted for possible TPN/ gastrostomy tube placement.      Review of Systems: Unable to obtain due to her mental status.       PAST MEDICAL/SURGICAL HISTORY:  Mentally disabled    Autism    Depression, major    Hypersalivation    Constipation    Nonverbal    OP (osteoporosis)    Cataract    S/P laparotomy      MEDICATIONS  (STANDING):  cholecalciferol 2000 Unit(s) Oral daily  dextrose 40% Gel 15 Gram(s) Oral once  dextrose 5% + lactated ringers. 1000 milliLiter(s) (120 mL/Hr) IV Continuous <Continuous>  dextrose 5%. 1000 milliLiter(s) (50 mL/Hr) IV Continuous <Continuous>  dextrose 5%. 1000 milliLiter(s) (100 mL/Hr) IV Continuous <Continuous>  dextrose 50% Injectable 25 Gram(s) IV Push once  dextrose 50% Injectable 12.5 Gram(s) IV Push once  dextrose 50% Injectable 25 Gram(s) IV Push once  enoxaparin Injectable 40 milliGRAM(s) SubCutaneous daily  escitalopram 5 milliGRAM(s) Oral daily  fluticasone propionate 50 MICROgram(s)/spray Nasal Spray 1 Spray(s) Both Nostrils every 12 hours  glucagon  Injectable 1 milliGRAM(s) IntraMuscular once  insulin lispro (ADMELOG) corrective regimen sliding scale   SubCutaneous every 6 hours  lactated ringers. 1000 milliLiter(s) (250 mL/Hr) IV Continuous <Continuous>  lactobacillus acidophilus 1 Tablet(s) Oral daily  levothyroxine Injectable 12.5 MICROGram(s) IV Push <User Schedule>  loratadine 10 milliGRAM(s) Oral daily  mesalamine DR Capsule 800 milliGRAM(s) Oral every 12 hours  mesalamine ER Capsule 375 milliGRAM(s) Oral daily  polyethylene glycol 3350 17 Gram(s) Oral daily  potassium chloride  10 mEq/100 mL IVPB 10 milliEquivalent(s) IV Intermittent every 1 hour  potassium chloride  10 mEq/100 mL IVPB 10 milliEquivalent(s) IV Intermittent every 1 hour    MEDICATIONS  (PRN):  ALBUTerol    90 MICROgram(s) HFA Inhaler 2 Puff(s) Inhalation every 6 hours PRN Shortness of Breath and/or Wheezing  aluminum hydroxide/magnesium hydroxide/simethicone Suspension 30 milliLiter(s) Oral every 4 hours PRN Dyspepsia  melatonin 3 milliGRAM(s) Oral at bedtime PRN Insomnia    No Known Allergies    T(C): 36.6 (12-04-21 @ 04:16), Max: 36.9 (12-03-21 @ 16:58)  HR: 100 (12-04-21 @ 04:16) (88 - 100)  BP: 120/72 (12-04-21 @ 04:16) (100/70 - 120/72)  RR: 18 (12-04-21 @ 04:16) (18 - 18)  SpO2: 97% (12-04-21 @ 04:16) (94% - 97%) on room air      03 Dec 2021 07:01  -  04 Dec 2021 07:00  --------------------------------------------------------  IN: 960 mL / OUT: 450 mL / NET: 510 mL      PHYSICAL EXAM:  Constitutional: No acute distress  Neuro: Awake alert, nonverbal, open eyes to verbal stimuli,   HEENT: PERRL, anicteric sclerae, oral mucosa pink and moist  Neck: supple, no JVD  CV:  +S1S2,   Pulm/chest: lung sounds CTA and equal bilaterally, no accessory muscle use noted  Abd: soft, NT, ND, +BS. Colostomy bag with brown liquid stool   Ext:  no Cyanosis, clubbing, +3 BLE edema  Skin: warm, well perfused, no jaundice   Psych: calm,      LABS:               10.9   8.16  )-----------( 309      ( 12-04 @ 06:16 )             33.3                11.1   9.59  )-----------( 288      ( 12-03 @ 06:28 )             34.4       12-04    149<H>  |  116<H>  |  12.1  ----------------------------<  118<H>  3.9   |  27.0  |  0.36<L>    Ca    8.1<L>      04 Dec 2021 06:16  Phos  2.4     12-04  Mg     1.9     12-04    TPro  x   /  Alb  2.1<L>  /  TBili  x   /  DBili  x   /  AST  x   /  ALT  x   /  AlkPhos  x   12-04    LIVER FUNCTIONS - ( 04 Dec 2021 06:16 )  Alb: 2.1 g/dL / Pro: x     / ALK PHOS: x     / ALT: x     / AST: x     / GGT: x              Chief Complaint: This is a 61y old woman patient being seen in follow-up consult for TPN vs gastrostomy tube placement.  Patient was originally seen by us during this admission for sigmoid volvulus and underwent a partial colonoscopy on admission by  with decompression and placement of a rectal tube. She subsequently  underwent a sigmoid resection and end colostomy on 11/29. Patient is now cleared to start oral intake, however patient is uncooperative and appears to be unable to eat due to poor mental status and failed bedside swallowing evaluation.   Gastroenterology is now reconsulted for possible TPN versus gastrostomy tube placement.      Review of Systems: Unable to obtain due to her mental status.       PAST MEDICAL/SURGICAL HISTORY:  Mentally disabled    Autism    Depression, major    Hypersalivation    Constipation    Nonverbal    OP (osteoporosis)    Cataract    S/P laparotomy      MEDICATIONS  (STANDING):  cholecalciferol 2000 Unit(s) Oral daily  dextrose 40% Gel 15 Gram(s) Oral once  dextrose 5% + lactated ringers. 1000 milliLiter(s) (120 mL/Hr) IV Continuous <Continuous>  dextrose 5%. 1000 milliLiter(s) (50 mL/Hr) IV Continuous <Continuous>  dextrose 5%. 1000 milliLiter(s) (100 mL/Hr) IV Continuous <Continuous>  dextrose 50% Injectable 25 Gram(s) IV Push once  dextrose 50% Injectable 12.5 Gram(s) IV Push once  dextrose 50% Injectable 25 Gram(s) IV Push once  enoxaparin Injectable 40 milliGRAM(s) SubCutaneous daily  escitalopram 5 milliGRAM(s) Oral daily  fluticasone propionate 50 MICROgram(s)/spray Nasal Spray 1 Spray(s) Both Nostrils every 12 hours  glucagon  Injectable 1 milliGRAM(s) IntraMuscular once  insulin lispro (ADMELOG) corrective regimen sliding scale   SubCutaneous every 6 hours  lactated ringers. 1000 milliLiter(s) (250 mL/Hr) IV Continuous <Continuous>  lactobacillus acidophilus 1 Tablet(s) Oral daily  levothyroxine Injectable 12.5 MICROGram(s) IV Push <User Schedule>  loratadine 10 milliGRAM(s) Oral daily  mesalamine DR Capsule 800 milliGRAM(s) Oral every 12 hours  mesalamine ER Capsule 375 milliGRAM(s) Oral daily  polyethylene glycol 3350 17 Gram(s) Oral daily  potassium chloride  10 mEq/100 mL IVPB 10 milliEquivalent(s) IV Intermittent every 1 hour  potassium chloride  10 mEq/100 mL IVPB 10 milliEquivalent(s) IV Intermittent every 1 hour    MEDICATIONS  (PRN):  ALBUTerol    90 MICROgram(s) HFA Inhaler 2 Puff(s) Inhalation every 6 hours PRN Shortness of Breath and/or Wheezing  aluminum hydroxide/magnesium hydroxide/simethicone Suspension 30 milliLiter(s) Oral every 4 hours PRN Dyspepsia  melatonin 3 milliGRAM(s) Oral at bedtime PRN Insomnia    No Known Allergies    T(C): 36.6 (12-04-21 @ 04:16), Max: 36.9 (12-03-21 @ 16:58)  HR: 100 (12-04-21 @ 04:16) (88 - 100)  BP: 120/72 (12-04-21 @ 04:16) (100/70 - 120/72)  RR: 18 (12-04-21 @ 04:16) (18 - 18)  SpO2: 97% (12-04-21 @ 04:16) (94% - 97%) on room air      03 Dec 2021 07:01  -  04 Dec 2021 07:00  --------------------------------------------------------  IN: 960 mL / OUT: 450 mL / NET: 510 mL      PHYSICAL EXAM:  Constitutional: No acute distress but very thin  Neuro: Awake alert, nonverbal, open eyes to verbal stimuli,   HEENT: PERRL, anicteric sclerae, crusted oral mucosa  Neck: supple, no JVD  CV:  +S1S2,   Pulm/chest: lung sounds CTA and equal bilaterally, no accessory muscle use noted  Abd: soft, NT, ND, +BS. Colostomy bag with brown liquid stool   Ext:  no Cyanosis, clubbing, +3 BLE edema, flexion contractures present throughout  Skin: warm, well perfused, no jaundice   Psych: nonverbal and does not follow simple commands      LABS:               10.9   8.16  )-----------( 309      ( 12-04 @ 06:16 )             33.3                11.1   9.59  )-----------( 288      ( 12-03 @ 06:28 )             34.4       12-04    149<H>  |  116<H>  |  12.1  ----------------------------<  118<H>  3.9   |  27.0  |  0.36<L>    Ca    8.1<L>      04 Dec 2021 06:16  Phos  2.4     12-04  Mg     1.9     12-04    TPro  x   /  Alb  2.1<L>  /  TBili  x   /  DBili  x   /  AST  x   /  ALT  x   /  AlkPhos  x   12-04    LIVER FUNCTIONS - ( 04 Dec 2021 06:16 )  Alb: 2.1 g/dL / Pro: x     / ALK PHOS: x     / ALT: x     / AST: x     / GGT: x

## 2021-12-04 NOTE — SWALLOW BEDSIDE ASSESSMENT ADULT - NS SPL SWALLOW CLINIC TRIAL FT
Pt demonstrated a labial seal to an empty utensil; however, reduced. When presented with 1/4 tsp of puree pt had reduced labial seal & absent oral stripping of puree from utensil. Therefore unable to assess oral & pharyngeal stage. Trace PO was removed manually by clinician & RN.  Pt not appropriate for PO at this time given presentation.

## 2021-12-04 NOTE — SWALLOW BEDSIDE ASSESSMENT ADULT - SLP GENERAL OBSERVATIONS
Pt received awake in bed, +reduced cognition, +non-verbal, +right corner drooling, +resting open mouth posture, +chapped lips, difficulty maintaining upright posture, 0/10 pain via non-verbal pain scale pre & post eval

## 2021-12-04 NOTE — PROGRESS NOTE ADULT - PROBLEM SELECTOR PLAN 1
Now s/p sigmoidectomy and colostomy. Colostomy bag with brown liquid stool, normal bowel sounds, no abdominal distension noted. Cleared to start oral intake by surgical team, puree diet with mildly thickened liquids is ordered. However patient refused to eat and she was evaluated by speech therapist, and was unsuccessful as patient was not cooperating per note.  There is no evidence of guarded bowel function, and enteral feeds will be beneficial rather than starting TPN for this patient. Therefore I will recommend to start enteral feeds by Dobbhoff for now and if the patient continue to be uncooperative and her mental status did not improves, we will consider placing a gastrostomy tube for enteral feeding Now s/p sigmoidectomy and colostomy. Colostomy bag with brown liquid stool, normal bowel sounds, no abdominal distension noted. Cleared to start oral intake by surgical team, puree diet with mildly thickened liquids is ordered. However patient refused or is unable to eat and she was evaluated by speech therapist, and was unsuccessful as patient was not cooperating per note.  There is no evidence of compromised bowel function and therefore enteral feeds will be beneficial rather than starting TPN for this patient. Therefore I will recommend to start enteral feeds by Dobbhoff for now and if the patient continue to be uncooperative and her mental status did not improve, we will consider placing a gastrostomy tube for enteral feeding if permission given by family

## 2021-12-04 NOTE — SWALLOW BEDSIDE ASSESSMENT ADULT - SWALLOW EVAL: DIAGNOSIS
Oral preparatory dysphagia significantly impacted by cognition. Unable to assess oral and pharyngeal stages

## 2021-12-04 NOTE — PROGRESS NOTE ADULT - SUBJECTIVE AND OBJECTIVE BOX
Colorectal Surgery Progress Note:  Patient evaluated at bedside. No acute distress. No acute events over night. Patient non-verbal at baseline seems comfortable. Discharged today, pending 24h of diet tolerance then will be transferred back to facility.      Diet, Pureed:   Mildly Thick Liquids (MILDTHICKLIQS) (12-03-21 @ 11:27) [Active]      Scheduled Medications:   cholecalciferol 2000 Unit(s) Oral daily  dextrose 40% Gel 15 Gram(s) Oral once  dextrose 5% + lactated ringers 1000 milliLiter(s) (100 mL/Hr) IV Continuous <Continuous>  dextrose 5%. 1000 milliLiter(s) (100 mL/Hr) IV Continuous <Continuous>  dextrose 5%. 1000 milliLiter(s) (50 mL/Hr) IV Continuous <Continuous>  dextrose 50% Injectable 25 Gram(s) IV Push once  dextrose 50% Injectable 12.5 Gram(s) IV Push once  dextrose 50% Injectable 25 Gram(s) IV Push once  enoxaparin Injectable 40 milliGRAM(s) SubCutaneous daily  escitalopram 5 milliGRAM(s) Oral daily  fluticasone propionate 50 MICROgram(s)/spray Nasal Spray 1 Spray(s) Both Nostrils every 12 hours  glucagon  Injectable 1 milliGRAM(s) IntraMuscular once  insulin lispro (ADMELOG) corrective regimen sliding scale   SubCutaneous every 6 hours  ketorolac   Injectable 15 milliGRAM(s) IV Push every 6 hours  lactobacillus acidophilus 1 Tablet(s) Oral daily  levothyroxine Injectable 12.5 MICROGram(s) IV Push <User Schedule>  loratadine 10 milliGRAM(s) Oral daily  mesalamine DR Capsule 800 milliGRAM(s) Oral every 12 hours  mesalamine ER Capsule 375 milliGRAM(s) Oral daily  polyethylene glycol 3350 17 Gram(s) Oral daily  potassium chloride  10 mEq/100 mL IVPB 10 milliEquivalent(s) IV Intermittent every 1 hour  potassium chloride  10 mEq/100 mL IVPB 10 milliEquivalent(s) IV Intermittent every 1 hour    PRN Medications:  ALBUTerol    90 MICROgram(s) HFA Inhaler 2 Puff(s) Inhalation every 6 hours PRN Shortness of Breath and/or Wheezing  aluminum hydroxide/magnesium hydroxide/simethicone Suspension 30 milliLiter(s) Oral every 4 hours PRN Dyspepsia  melatonin 3 milliGRAM(s) Oral at bedtime PRN Insomnia      Objective:   Vital Signs Last 24 Hrs  T(C): 36.4 (03 Dec 2021 22:07), Max: 36.9 (03 Dec 2021 16:58)  T(F): 97.5 (03 Dec 2021 22:07), Max: 98.5 (03 Dec 2021 16:58)  HR: 93 (03 Dec 2021 22:07) (83 - 93)  BP: 117/73 (03 Dec 2021 22:07) (100/70 - 138/86)  BP(mean): --  ABP: --  ABP(mean): --  RR: 18 (03 Dec 2021 22:07) (18 - 18)  SpO2: 95% (03 Dec 2021 22:07) (94% - 96%)      Physical Exam:   GEN: patient resting in bed, in no acute distress  RESP: respirations are unlabored, no accessory muscle use  GI: Abdomen soft, non-tender, non-distended, no rebound tenderness / guarding; stoma pink and patient with continued gas in bag but without fluid output  MSK: all extremities contracted to chest, as per baseline    I&O's Detail    02 Dec 2021 07:01  -  03 Dec 2021 07:00  --------------------------------------------------------  IN:    dextrose 5% + lactated ringers w/ Additives: 1200 mL  Total IN: 1200 mL    OUT:    Colostomy (mL): 225 mL    Voided (mL): 400 mL  Total OUT: 625 mL    Total NET: 575 mL      .  LABS:                         11.1   9.59  )-----------( 288      ( 03 Dec 2021 06:28 )             34.4     12-03    151<H>  |  118<H>  |  15.7  ----------------------------<  96  4.1   |  23.0  |  0.36<L>    Ca    8.5<L>      03 Dec 2021 15:17  Phos  1.6     12-03  Mg     1.9     12-03                RADIOLOGY, EKG & ADDITIONAL TESTS: Reviewed.

## 2021-12-04 NOTE — SWALLOW BEDSIDE ASSESSMENT ADULT - COMMENTS
RN reporting pt with oral holding with any presented puree. She performed oral care earlier this date clearing oral cavity of any remaining PO.

## 2021-12-04 NOTE — SWALLOW BEDSIDE ASSESSMENT ADULT - SLP PERTINENT HISTORY OF CURRENT PROBLEM
As per charting, "Patient is a 61y old female with severe developmental delay, nonverbal at baseline transferred for sigmoid volvulus s/p sigmoid decompression. Due to likelihood of recurrence, surgical intervention recommended. Now s/p open sigmoidectomy with ostomy creation. Patient progressing well with functioning ostomy. "

## 2021-12-05 LAB
ANION GAP SERPL CALC-SCNC: 9 MMOL/L — SIGNIFICANT CHANGE UP (ref 5–17)
BUN SERPL-MCNC: 8.6 MG/DL — SIGNIFICANT CHANGE UP (ref 8–20)
CALCIUM SERPL-MCNC: 7.9 MG/DL — LOW (ref 8.6–10.2)
CHLORIDE SERPL-SCNC: 112 MMOL/L — HIGH (ref 98–107)
CO2 SERPL-SCNC: 21 MMOL/L — LOW (ref 22–29)
CREAT SERPL-MCNC: 0.36 MG/DL — LOW (ref 0.5–1.3)
GLUCOSE BLDC GLUCOMTR-MCNC: 103 MG/DL — HIGH (ref 70–99)
GLUCOSE BLDC GLUCOMTR-MCNC: 113 MG/DL — HIGH (ref 70–99)
GLUCOSE BLDC GLUCOMTR-MCNC: 88 MG/DL — SIGNIFICANT CHANGE UP (ref 70–99)
GLUCOSE BLDC GLUCOMTR-MCNC: 97 MG/DL — SIGNIFICANT CHANGE UP (ref 70–99)
GLUCOSE SERPL-MCNC: 93 MG/DL — SIGNIFICANT CHANGE UP (ref 70–99)
MAGNESIUM SERPL-MCNC: 1.9 MG/DL — SIGNIFICANT CHANGE UP (ref 1.6–2.6)
PHOSPHATE SERPL-MCNC: 3.3 MG/DL — SIGNIFICANT CHANGE UP (ref 2.4–4.7)
POTASSIUM SERPL-MCNC: 5 MMOL/L — SIGNIFICANT CHANGE UP (ref 3.5–5.3)
POTASSIUM SERPL-SCNC: 5 MMOL/L — SIGNIFICANT CHANGE UP (ref 3.5–5.3)
SARS-COV-2 RNA SPEC QL NAA+PROBE: SIGNIFICANT CHANGE UP
SODIUM SERPL-SCNC: 142 MMOL/L — SIGNIFICANT CHANGE UP (ref 135–145)

## 2021-12-05 PROCEDURE — 71045 X-RAY EXAM CHEST 1 VIEW: CPT | Mod: 26

## 2021-12-05 PROCEDURE — 99233 SBSQ HOSP IP/OBS HIGH 50: CPT

## 2021-12-05 RX ADMIN — Medication 1 SPRAY(S): at 18:00

## 2021-12-05 RX ADMIN — ENOXAPARIN SODIUM 40 MILLIGRAM(S): 100 INJECTION SUBCUTANEOUS at 11:27

## 2021-12-05 RX ADMIN — Medication 12.5 MICROGRAM(S): at 06:05

## 2021-12-05 RX ADMIN — Medication 1 SPRAY(S): at 06:05

## 2021-12-05 NOTE — PROGRESS NOTE ADULT - ASSESSMENT
As per my previous recommendation, the patient appears to have a functioning got and therefore if forced feeds are warranted I would recommend a Dobbhoff tube with enteral feeds.  If it appears that this is going to be a longstanding issue a percutaneous endoscopic gastrostomy tube should ultimately be placed.  Otherwise we will see as needed your request only.

## 2021-12-05 NOTE — PROGRESS NOTE ADULT - ATTENDING COMMENTS
Patient seen and examined at bedside. 61y old female with severe developmental delay, sigmoid volvulus s/p sigmoid decompression, now s/p surgical intervention --> open sigmoidectomy with ostomy creation. Patient appears in NAD, evaluated by speech pathologist and is not appropriate for PO intake. Patient will need alternate route for nutrition.    GI consulted for evaluation and PEG-tube placement  Recommending placement of nasogastric feeding tube, follow up eval for PEG.

## 2021-12-05 NOTE — PROGRESS NOTE ADULT - ASSESSMENT
Patient is a 61y old female with severe developmental delay, nonverbal at baseline transferred for sigmoid volvulus s/p sigmoid decompression. Due to likelihood of recurrence, surgical intervention recommended. Now s/p open sigmoidectomy with ostomy creation. Patient progressing well with functioning ostomy however not tolerating PO.   Albumin 2.1 and Pre-albumin 8.    PLAN:    - Pain control MMD   - Monitor ostomy o/p   - NPO until nutrition access can be achieved  - FU GI- Dobbhoff recommended instead of TPN/PICC.  If peg tube is an option, patient will need consent by family member.  - Monitor lytes, replete PRN    Patient is a 61y old female with severe developmental delay, nonverbal at baseline transferred for sigmoid volvulus s/p sigmoid decompression. Due to likelihood of recurrence, surgical intervention recommended. Now s/p open sigmoidectomy with ostomy creation. Patient progressing well with functioning ostomy however not tolerating PO.   Albumin 2.1 and Pre-albumin 8.    PLAN:    - Pain control MMD   - Monitor ostomy o/p   - NPO until nutrition access can be achieved  - FU GI- Dobbhoff recommended instead of TPN/PICC.  If peg tube is an option, patient will need consent by family member.  - Monitor lytes, replete PRN   - Monitor lower extremity edema

## 2021-12-05 NOTE — PROGRESS NOTE ADULT - SUBJECTIVE AND OBJECTIVE BOX
Colorectal Surgery Progress Note:    Patient is s/p GI decompression with c-scope on 11/27/21 and sigmoidectomy w/ ostomy creation on 11/29.  Stoma continues to be pink and patent with good fluid output.  Patient is nonverbal and unable to share symptoms.  However, nurse 12/4 reported patient to spit out any PO fluid intake and medications.  Speech and swallow evaluation rendered patient unable to tolerate PO safely and recommended NPO.      GI evaluated patient and recommended Dobbhoff rather than TPN/PICC.    No acute overnight events. Patient afebrile, VSS. Pain well controlled. Denies n/v/f/c/cp/sob.     Diet, NPO:   Except Medications (12-04-21 @ 20:03)      Scheduled Medications:   cholecalciferol 2000 Unit(s) Oral daily  dextrose 40% Gel 15 Gram(s) Oral once  dextrose 5% + lactated ringers. 1000 milliLiter(s) (75 mL/Hr) IV Continuous <Continuous>  dextrose 5%. 1000 milliLiter(s) (100 mL/Hr) IV Continuous <Continuous>  dextrose 5%. 1000 milliLiter(s) (50 mL/Hr) IV Continuous <Continuous>  dextrose 50% Injectable 25 Gram(s) IV Push once  dextrose 50% Injectable 12.5 Gram(s) IV Push once  dextrose 50% Injectable 25 Gram(s) IV Push once  enoxaparin Injectable 40 milliGRAM(s) SubCutaneous daily  escitalopram 5 milliGRAM(s) Oral daily  fluticasone propionate 50 MICROgram(s)/spray Nasal Spray 1 Spray(s) Both Nostrils every 12 hours  glucagon  Injectable 1 milliGRAM(s) IntraMuscular once  insulin lispro (ADMELOG) corrective regimen sliding scale   SubCutaneous every 6 hours  lactobacillus acidophilus 1 Tablet(s) Oral daily  levothyroxine Injectable 12.5 MICROGram(s) IV Push <User Schedule>  loratadine 10 milliGRAM(s) Oral daily  mesalamine DR Capsule 800 milliGRAM(s) Oral every 12 hours  mesalamine ER Capsule 375 milliGRAM(s) Oral daily  polyethylene glycol 3350 17 Gram(s) Oral daily  potassium chloride  10 mEq/100 mL IVPB 10 milliEquivalent(s) IV Intermittent every 1 hour  potassium chloride  10 mEq/100 mL IVPB 10 milliEquivalent(s) IV Intermittent every 1 hour    PRN Medications:  ALBUTerol    90 MICROgram(s) HFA Inhaler 2 Puff(s) Inhalation every 6 hours PRN Shortness of Breath and/or Wheezing  aluminum hydroxide/magnesium hydroxide/simethicone Suspension 30 milliLiter(s) Oral every 4 hours PRN Dyspepsia  melatonin 3 milliGRAM(s) Oral at bedtime PRN Insomnia      Objective:   T(F): 98.4 (12-04 @ 21:26), Max: 98.4 (12-04 @ 21:26)  HR: 81 (12-04 @ 21:26) (81 - 100)  BP: 114/65 (12-04 @ 21:26) (101/65 - 120/72)  BP(mean): --  ABP: --  ABP(mean): --  RR: 18 (12-04 @ 21:26) (18 - 19)  SpO2: 94% (12-04 @ 21:26) (92% - 100%)      Physical Exam:   GEN: patient resting comfortably in bed, in no acute distress  RESP: respirations are unlabored, no accessory muscle use, no conversational dyspnea  CVS: RRR  GI: Abdomen soft, non-tender, non-distended, no rebound tenderness / guarding, stoma pink and patent above skin, thick fecal fluid material in bag  MSK: lower extremity 1+ pitting edema bilaterally    I&O's    12-03 @ 07:01  -  12-04 @ 07:00  --------------------------------------------------------  IN:    dextrose 5% + lactated ringers: 960 mL  Total IN: 960 mL    OUT:    Colostomy (mL): 250 mL    Voided (mL): 200 mL  Total OUT: 450 mL    Total NET: 510 mL      12-04 @ 07:01  -  12-05 @ 03:47  --------------------------------------------------------  IN:  Total IN: 0 mL    OUT:    Colostomy (mL): 350 mL    Voided (mL): 650 mL  Total OUT: 1000 mL    Total NET: -1000 mL          LABS:                        10.9   8.16  )-----------( 309      ( 04 Dec 2021 06:16 )             33.3     12-04    149<H>  |  116<H>  |  12.1  ----------------------------<  118<H>  3.9   |  27.0  |  0.36<L>    Ca    8.1<L>      04 Dec 2021 06:16  Phos  2.4     12-04  Mg     1.9     12-04    TPro  x   /  Alb  2.1<L>  /  TBili  x   /  DBili  x   /  AST  x   /  ALT  x   /  AlkPhos  x   12-04          MICROBIOLOGY:       PATHOLOGY:

## 2021-12-05 NOTE — PROGRESS NOTE ADULT - SUBJECTIVE AND OBJECTIVE BOX
Pt seen and examined: Follow-up for oropharyngeal dysphagia and autistic patient with recent sigmoid resection for volvulus with end colostomy    According to the nurse the patient will occasionally eat applesauce and yogurt without difficulty but has trouble with clear liquids and occasionally she will be intolerant of these feeds as the food will simply sit in her mouth.  The patient continues to be nonverbal without any recent change.    REVIEW OF SYSTEMS: unable to obtain        MEDICATIONS:  MEDICATIONS  (STANDING):  cholecalciferol 2000 Unit(s) Oral daily  dextrose 40% Gel 15 Gram(s) Oral once  dextrose 5% + lactated ringers. 1000 milliLiter(s) (75 mL/Hr) IV Continuous <Continuous>  dextrose 5%. 1000 milliLiter(s) (50 mL/Hr) IV Continuous <Continuous>  dextrose 5%. 1000 milliLiter(s) (100 mL/Hr) IV Continuous <Continuous>  dextrose 50% Injectable 25 Gram(s) IV Push once  dextrose 50% Injectable 12.5 Gram(s) IV Push once  dextrose 50% Injectable 25 Gram(s) IV Push once  enoxaparin Injectable 40 milliGRAM(s) SubCutaneous daily  escitalopram 5 milliGRAM(s) Oral daily  fluticasone propionate 50 MICROgram(s)/spray Nasal Spray 1 Spray(s) Both Nostrils every 12 hours  glucagon  Injectable 1 milliGRAM(s) IntraMuscular once  insulin lispro (ADMELOG) corrective regimen sliding scale   SubCutaneous every 6 hours  lactobacillus acidophilus 1 Tablet(s) Oral daily  levothyroxine Injectable 12.5 MICROGram(s) IV Push <User Schedule>  loratadine 10 milliGRAM(s) Oral daily  mesalamine DR Capsule 800 milliGRAM(s) Oral every 12 hours  mesalamine ER Capsule 375 milliGRAM(s) Oral daily  polyethylene glycol 3350 17 Gram(s) Oral daily  potassium chloride  10 mEq/100 mL IVPB 10 milliEquivalent(s) IV Intermittent every 1 hour  potassium chloride  10 mEq/100 mL IVPB 10 milliEquivalent(s) IV Intermittent every 1 hour    MEDICATIONS  (PRN):  ALBUTerol    90 MICROgram(s) HFA Inhaler 2 Puff(s) Inhalation every 6 hours PRN Shortness of Breath and/or Wheezing  aluminum hydroxide/magnesium hydroxide/simethicone Suspension 30 milliLiter(s) Oral every 4 hours PRN Dyspepsia  melatonin 3 milliGRAM(s) Oral at bedtime PRN Insomnia      Allergies    No Known Allergies    Intolerances        Vital Signs Last 24 Hrs  T(C): 36.9 (04 Dec 2021 21:26), Max: 36.9 (04 Dec 2021 21:26)  T(F): 98.4 (04 Dec 2021 21:26), Max: 98.4 (04 Dec 2021 21:26)  HR: 81 (04 Dec 2021 21:26) (81 - 100)  BP: 114/65 (04 Dec 2021 21:26) (101/65 - 114/65)  BP(mean): --  RR: 18 (04 Dec 2021 21:26) (18 - 19)  SpO2: 94% (04 Dec 2021 21:26) (92% - 100%)    12-04 @ 07:01  -  12-05 @ 07:00  --------------------------------------------------------  IN: 0 mL / OUT: 1800 mL / NET: -1800 mL    12-05 @ 07:01  -  12-05 @ 13:23  --------------------------------------------------------  IN: 0 mL / OUT: 350 mL / NET: -350 mL        PHYSICAL EXAM:    General: thin, almost cachectic female in no acute distress, nonverbal  HEENT: MMM, conjunctiva and sclera clear  Gastrointestinal:Abdomen: Soft non-tender non-distended; Normal bowel sounds; No hepatosplenomegaly,  Functioning colostomy in LLQ  Extremities: no cyanosis, clubbing or edema. Flexion contractures present  Skin: Warm and dry. No obvious rash    LABS:      CBC Full  -  ( 04 Dec 2021 06:16 )  WBC Count : 8.16 K/uL  RBC Count : 3.79 M/uL  Hemoglobin : 10.9 g/dL  Hematocrit : 33.3 %  Platelet Count - Automated : 309 K/uL  Mean Cell Volume : 87.9 fl  Mean Cell Hemoglobin : 28.8 pg  Mean Cell Hemoglobin Concentration : 32.7 gm/dL  Auto Neutrophil # : 6.01 K/uL  Auto Lymphocyte # : 0.97 K/uL  Auto Monocyte # : 0.88 K/uL  Auto Eosinophil # : 0.19 K/uL  Auto Basophil # : 0.01 K/uL  Auto Neutrophil % : 73.7 %  Auto Lymphocyte % : 11.9 %  Auto Monocyte % : 10.8 %  Auto Eosinophil % : 2.3 %  Auto Basophil % : 0.1 %    12-04    149<H>  |  116<H>  |  12.1  ----------------------------<  118<H>  3.9   |  27.0  |  0.36<L>    Ca    8.1<L>      04 Dec 2021 06:16  Phos  2.4     12-04  Mg     1.9     12-04    TPro  x   /  Alb  2.1<L>  /  TBili  x   /  DBili  x   /  AST  x   /  ALT  x   /  AlkPhos  x   12-04

## 2021-12-06 LAB
ANION GAP SERPL CALC-SCNC: 7 MMOL/L — SIGNIFICANT CHANGE UP (ref 5–17)
BASOPHILS # BLD AUTO: 0.02 K/UL — SIGNIFICANT CHANGE UP (ref 0–0.2)
BASOPHILS NFR BLD AUTO: 0.2 % — SIGNIFICANT CHANGE UP (ref 0–2)
BUN SERPL-MCNC: 10.1 MG/DL — SIGNIFICANT CHANGE UP (ref 8–20)
CALCIUM SERPL-MCNC: 8.1 MG/DL — LOW (ref 8.6–10.2)
CHLORIDE SERPL-SCNC: 111 MMOL/L — HIGH (ref 98–107)
CO2 SERPL-SCNC: 27 MMOL/L — SIGNIFICANT CHANGE UP (ref 22–29)
CREAT SERPL-MCNC: 0.39 MG/DL — LOW (ref 0.5–1.3)
EOSINOPHIL # BLD AUTO: 0.27 K/UL — SIGNIFICANT CHANGE UP (ref 0–0.5)
EOSINOPHIL NFR BLD AUTO: 3 % — SIGNIFICANT CHANGE UP (ref 0–6)
GLUCOSE BLDC GLUCOMTR-MCNC: 104 MG/DL — HIGH (ref 70–99)
GLUCOSE BLDC GLUCOMTR-MCNC: 86 MG/DL — SIGNIFICANT CHANGE UP (ref 70–99)
GLUCOSE BLDC GLUCOMTR-MCNC: 90 MG/DL — SIGNIFICANT CHANGE UP (ref 70–99)
GLUCOSE SERPL-MCNC: 108 MG/DL — HIGH (ref 70–99)
HCT VFR BLD CALC: 39.5 % — SIGNIFICANT CHANGE UP (ref 34.5–45)
HGB BLD-MCNC: 12.7 G/DL — SIGNIFICANT CHANGE UP (ref 11.5–15.5)
IMM GRANULOCYTES NFR BLD AUTO: 0.9 % — SIGNIFICANT CHANGE UP (ref 0–1.5)
LYMPHOCYTES # BLD AUTO: 1.06 K/UL — SIGNIFICANT CHANGE UP (ref 1–3.3)
LYMPHOCYTES # BLD AUTO: 11.6 % — LOW (ref 13–44)
MAGNESIUM SERPL-MCNC: 2 MG/DL — SIGNIFICANT CHANGE UP (ref 1.6–2.6)
MCHC RBC-ENTMCNC: 28.5 PG — SIGNIFICANT CHANGE UP (ref 27–34)
MCHC RBC-ENTMCNC: 32.2 GM/DL — SIGNIFICANT CHANGE UP (ref 32–36)
MCV RBC AUTO: 88.6 FL — SIGNIFICANT CHANGE UP (ref 80–100)
MONOCYTES # BLD AUTO: 0.57 K/UL — SIGNIFICANT CHANGE UP (ref 0–0.9)
MONOCYTES NFR BLD AUTO: 6.2 % — SIGNIFICANT CHANGE UP (ref 2–14)
NEUTROPHILS # BLD AUTO: 7.13 K/UL — SIGNIFICANT CHANGE UP (ref 1.8–7.4)
NEUTROPHILS NFR BLD AUTO: 78.1 % — HIGH (ref 43–77)
PHOSPHATE SERPL-MCNC: 2.8 MG/DL — SIGNIFICANT CHANGE UP (ref 2.4–4.7)
PLATELET # BLD AUTO: 400 K/UL — SIGNIFICANT CHANGE UP (ref 150–400)
POTASSIUM SERPL-MCNC: 3.9 MMOL/L — SIGNIFICANT CHANGE UP (ref 3.5–5.3)
POTASSIUM SERPL-SCNC: 3.9 MMOL/L — SIGNIFICANT CHANGE UP (ref 3.5–5.3)
RBC # BLD: 4.46 M/UL — SIGNIFICANT CHANGE UP (ref 3.8–5.2)
RBC # FLD: 14.1 % — SIGNIFICANT CHANGE UP (ref 10.3–14.5)
SODIUM SERPL-SCNC: 145 MMOL/L — SIGNIFICANT CHANGE UP (ref 135–145)
WBC # BLD: 9.13 K/UL — SIGNIFICANT CHANGE UP (ref 3.8–10.5)
WBC # FLD AUTO: 9.13 K/UL — SIGNIFICANT CHANGE UP (ref 3.8–10.5)

## 2021-12-06 PROCEDURE — 74177 CT ABD & PELVIS W/CONTRAST: CPT | Mod: 26

## 2021-12-06 PROCEDURE — 99233 SBSQ HOSP IP/OBS HIGH 50: CPT

## 2021-12-06 PROCEDURE — 71045 X-RAY EXAM CHEST 1 VIEW: CPT | Mod: 26

## 2021-12-06 RX ORDER — MAGNESIUM SULFATE 500 MG/ML
2 VIAL (ML) INJECTION ONCE
Refills: 0 | Status: DISCONTINUED | OUTPATIENT
Start: 2021-12-06 | End: 2021-12-06

## 2021-12-06 RX ORDER — POTASSIUM PHOSPHATE, MONOBASIC POTASSIUM PHOSPHATE, DIBASIC 236; 224 MG/ML; MG/ML
30 INJECTION, SOLUTION INTRAVENOUS ONCE
Refills: 0 | Status: COMPLETED | OUTPATIENT
Start: 2021-12-06 | End: 2021-12-06

## 2021-12-06 RX ADMIN — Medication 1 SPRAY(S): at 05:27

## 2021-12-06 RX ADMIN — Medication 1 SPRAY(S): at 17:15

## 2021-12-06 RX ADMIN — Medication 2000 UNIT(S): at 11:26

## 2021-12-06 RX ADMIN — POLYETHYLENE GLYCOL 3350 17 GRAM(S): 17 POWDER, FOR SOLUTION ORAL at 11:26

## 2021-12-06 RX ADMIN — Medication 12.5 MICROGRAM(S): at 05:27

## 2021-12-06 RX ADMIN — Medication 1 TABLET(S): at 11:26

## 2021-12-06 RX ADMIN — SODIUM CHLORIDE 75 MILLILITER(S): 9 INJECTION, SOLUTION INTRAVENOUS at 15:00

## 2021-12-06 RX ADMIN — ENOXAPARIN SODIUM 40 MILLIGRAM(S): 100 INJECTION SUBCUTANEOUS at 11:26

## 2021-12-06 RX ADMIN — LORATADINE 10 MILLIGRAM(S): 10 TABLET ORAL at 11:26

## 2021-12-06 RX ADMIN — POTASSIUM PHOSPHATE, MONOBASIC POTASSIUM PHOSPHATE, DIBASIC 83.33 MILLIMOLE(S): 236; 224 INJECTION, SOLUTION INTRAVENOUS at 09:09

## 2021-12-06 RX ADMIN — ESCITALOPRAM OXALATE 5 MILLIGRAM(S): 10 TABLET, FILM COATED ORAL at 11:26

## 2021-12-06 NOTE — PROGRESS NOTE ADULT - SUBJECTIVE AND OBJECTIVE BOX
Chief Complaint:  Patient is a 61y old  Female being seen as follow-up for oropharyngeal dysphagia and autistic patient with recent sigmoid resection for volvulus with end colostomy.        Interval Events / Subjective: Patient seen and evaluated at bedside, reporting no complaints, no overnight events. Patient nonverbal with Dobbhoff with Vital tube feeds infusing at 20cc/hr. Patient appears to be tolerating well. Abdomen soft, nontender. Left colostomy in place with output.       REVIEW OF SYSTEMS:   Unable to obtain.     MEDICATIONS:   MEDICATIONS  (STANDING):  cholecalciferol 2000 Unit(s) Oral daily  dextrose 40% Gel 15 Gram(s) Oral once  dextrose 5% + lactated ringers. 1000 milliLiter(s) (75 mL/Hr) IV Continuous <Continuous>  dextrose 5%. 1000 milliLiter(s) (100 mL/Hr) IV Continuous <Continuous>  dextrose 5%. 1000 milliLiter(s) (50 mL/Hr) IV Continuous <Continuous>  dextrose 50% Injectable 25 Gram(s) IV Push once  dextrose 50% Injectable 25 Gram(s) IV Push once  dextrose 50% Injectable 12.5 Gram(s) IV Push once  enoxaparin Injectable 40 milliGRAM(s) SubCutaneous daily  escitalopram 5 milliGRAM(s) Oral daily  fluticasone propionate 50 MICROgram(s)/spray Nasal Spray 1 Spray(s) Both Nostrils every 12 hours  glucagon  Injectable 1 milliGRAM(s) IntraMuscular once  insulin lispro (ADMELOG) corrective regimen sliding scale   SubCutaneous every 6 hours  lactobacillus acidophilus 1 Tablet(s) Oral daily  levothyroxine Injectable 12.5 MICROGram(s) IV Push <User Schedule>  loratadine 10 milliGRAM(s) Oral daily  mesalamine DR Capsule 800 milliGRAM(s) Oral every 12 hours  mesalamine ER Capsule 375 milliGRAM(s) Oral daily  polyethylene glycol 3350 17 Gram(s) Oral daily  potassium chloride  10 mEq/100 mL IVPB 10 milliEquivalent(s) IV Intermittent every 1 hour  potassium chloride  10 mEq/100 mL IVPB 10 milliEquivalent(s) IV Intermittent every 1 hour  potassium phosphate IVPB 30 milliMole(s) IV Intermittent once    MEDICATIONS  (PRN):  ALBUTerol    90 MICROgram(s) HFA Inhaler 2 Puff(s) Inhalation every 6 hours PRN Shortness of Breath and/or Wheezing  aluminum hydroxide/magnesium hydroxide/simethicone Suspension 30 milliLiter(s) Oral every 4 hours PRN Dyspepsia  melatonin 3 milliGRAM(s) Oral at bedtime PRN Insomnia      ALLERGIES:   Allergies    No Known Allergies    Intolerances        VITAL SIGNS:   Vital Signs Last 24 Hrs  T(C): 36.4 (06 Dec 2021 04:23), Max: 36.7 (05 Dec 2021 16:53)  T(F): 97.6 (06 Dec 2021 04:23), Max: 98.1 (05 Dec 2021 16:53)  HR: 68 (06 Dec 2021 04:23) (68 - 83)  BP: 127/81 (06 Dec 2021 04:23) (91/68 - 127/81)  BP(mean): --  RR: 18 (06 Dec 2021 04:23) (17 - 18)  SpO2: 94% (06 Dec 2021 04:23) (93% - 94%)  I&O's Summary    05 Dec 2021 07:01  -  06 Dec 2021 07:00  --------------------------------------------------------  IN: 900 mL / OUT: 1175 mL / NET: -275 mL        PHYSICAL EXAM:   GENERAL:  Appears stated age, no distress  HEENT:  NC/AT,  conjunctivae clear, sclera -anicteric  CHEST:  Full & symmetric excursion, no increased effort, breath sounds clear  HEART:  Regular rhythm, S1, S2, no murmur/rub/S3/S4,  no edema  ABDOMEN: Left colostomy in place. Soft, non-tender, non-distended, normoactive bowel sounds,  no masses, no hepatosplenomegaly,   EXTREMITIES: No cyanosis, clubbing or edema  SKIN:  No rash/erythema/ecchymoses/petechiae/wounds/abscess/warm/dry  NEURO:  Alert, oriented    LABS:  CBC Full  -  ( 06 Dec 2021 06:25 )  WBC Count : 9.13 K/uL  RBC Count : 4.46 M/uL  Hemoglobin : 12.7 g/dL  Hematocrit : 39.5 %  Platelet Count - Automated : 400 K/uL  Mean Cell Volume : 88.6 fl  Mean Cell Hemoglobin : 28.5 pg  Mean Cell Hemoglobin Concentration : 32.2 gm/dL  Auto Neutrophil # : 7.13 K/uL  Auto Lymphocyte # : 1.06 K/uL  Auto Monocyte # : 0.57 K/uL  Auto Eosinophil # : 0.27 K/uL  Auto Basophil # : 0.02 K/uL  Auto Neutrophil % : 78.1 %  Auto Lymphocyte % : 11.6 %  Auto Monocyte % : 6.2 %  Auto Eosinophil % : 3.0 %  Auto Basophil % : 0.2 %    12-06    145  |  111<H>  |  10.1  ----------------------------<  108<H>  3.9   |  27.0  |  0.39<L>    Ca    8.1<L>      06 Dec 2021 06:25  Phos  2.8     12-06  Mg     2.0     12-06                RADIOLOGY & ADDITIONAL STUDIES (The following images were personally reviewed):       EXAM:  XR ABDOMEN PORTABLE URGENT 1V                          PROCEDURE DATE:  11/27/2021          INTERPRETATION:  Clinical history: 61-year-old female, abdominal distention.    Portable view of the abdomen demonstrates marked bowel distention with no gross pneumoperitoneum or pneumatosis. Limited study.    IMPRESSION:  Marked bowel distention, limited study, follow-up CT can be ordered as clinically indicated    --- End of Report ---            MARCIAL THOMAS DO; Attending Radiologist  This document has been electronically signed. Nov 29 2021 10:26AM       Chief Complaint:  Patient is a 61y old  Female being seen as follow-up for oropharyngeal dysphagia and autistic patient with recent sigmoid resection for volvulus with end colostomy.        Interval Events / Subjective: Patient seen and evaluated at bedside, reporting no complaints, no overnight events. Patient nonverbal with Dobbhoff with Vital tube feeds infusing at 20cc/hr. Patient appears to be tolerating well. Abdomen soft, nontender. Left colostomy in place with output.       REVIEW OF SYSTEMS:   Unable to obtain.     MEDICATIONS:   MEDICATIONS  (STANDING):  cholecalciferol 2000 Unit(s) Oral daily  dextrose 40% Gel 15 Gram(s) Oral once  dextrose 5% + lactated ringers. 1000 milliLiter(s) (75 mL/Hr) IV Continuous <Continuous>  dextrose 5%. 1000 milliLiter(s) (100 mL/Hr) IV Continuous <Continuous>  dextrose 5%. 1000 milliLiter(s) (50 mL/Hr) IV Continuous <Continuous>  dextrose 50% Injectable 25 Gram(s) IV Push once  dextrose 50% Injectable 25 Gram(s) IV Push once  dextrose 50% Injectable 12.5 Gram(s) IV Push once  enoxaparin Injectable 40 milliGRAM(s) SubCutaneous daily  escitalopram 5 milliGRAM(s) Oral daily  fluticasone propionate 50 MICROgram(s)/spray Nasal Spray 1 Spray(s) Both Nostrils every 12 hours  glucagon  Injectable 1 milliGRAM(s) IntraMuscular once  insulin lispro (ADMELOG) corrective regimen sliding scale   SubCutaneous every 6 hours  lactobacillus acidophilus 1 Tablet(s) Oral daily  levothyroxine Injectable 12.5 MICROGram(s) IV Push <User Schedule>  loratadine 10 milliGRAM(s) Oral daily  mesalamine DR Capsule 800 milliGRAM(s) Oral every 12 hours  mesalamine ER Capsule 375 milliGRAM(s) Oral daily  polyethylene glycol 3350 17 Gram(s) Oral daily  potassium chloride  10 mEq/100 mL IVPB 10 milliEquivalent(s) IV Intermittent every 1 hour  potassium chloride  10 mEq/100 mL IVPB 10 milliEquivalent(s) IV Intermittent every 1 hour  potassium phosphate IVPB 30 milliMole(s) IV Intermittent once    MEDICATIONS  (PRN):  ALBUTerol    90 MICROgram(s) HFA Inhaler 2 Puff(s) Inhalation every 6 hours PRN Shortness of Breath and/or Wheezing  aluminum hydroxide/magnesium hydroxide/simethicone Suspension 30 milliLiter(s) Oral every 4 hours PRN Dyspepsia  melatonin 3 milliGRAM(s) Oral at bedtime PRN Insomnia      ALLERGIES:   Allergies    No Known Allergies    Intolerances        VITAL SIGNS:   Vital Signs Last 24 Hrs  T(C): 36.4 (06 Dec 2021 04:23), Max: 36.7 (05 Dec 2021 16:53)  T(F): 97.6 (06 Dec 2021 04:23), Max: 98.1 (05 Dec 2021 16:53)  HR: 68 (06 Dec 2021 04:23) (68 - 83)  BP: 127/81 (06 Dec 2021 04:23) (91/68 - 127/81)  BP(mean): --  RR: 18 (06 Dec 2021 04:23) (17 - 18)  SpO2: 94% (06 Dec 2021 04:23) (93% - 94%)  I&O's Summary    05 Dec 2021 07:01  -  06 Dec 2021 07:00  --------------------------------------------------------  IN: 900 mL / OUT: 1175 mL / NET: -275 mL        PHYSICAL EXAM:   GENERAL:  Appears stated age, no distress  HEENT:  NC/AT,  conjunctivae clear, sclera -anicteric  CHEST:  Full & symmetric excursion, no increased effort, breath sounds clear  HEART:  Regular rhythm, S1, S2, no murmur/rub/S3/S4,  no edema  ABDOMEN: Left colostomy in place. Soft, non-tender, non-distended, normoactive bowel sounds,  no masses, no hepatosplenomegaly,   EXTREMITIES: +2 bilateral lower extremity edema. No cyanosis.  SKIN:  No rash/erythema/ecchymoses/petechiae/wounds/abscess/warm/dry  NEURO:  Alert, oriented    LABS:  CBC Full  -  ( 06 Dec 2021 06:25 )  WBC Count : 9.13 K/uL  RBC Count : 4.46 M/uL  Hemoglobin : 12.7 g/dL  Hematocrit : 39.5 %  Platelet Count - Automated : 400 K/uL  Mean Cell Volume : 88.6 fl  Mean Cell Hemoglobin : 28.5 pg  Mean Cell Hemoglobin Concentration : 32.2 gm/dL  Auto Neutrophil # : 7.13 K/uL  Auto Lymphocyte # : 1.06 K/uL  Auto Monocyte # : 0.57 K/uL  Auto Eosinophil # : 0.27 K/uL  Auto Basophil # : 0.02 K/uL  Auto Neutrophil % : 78.1 %  Auto Lymphocyte % : 11.6 %  Auto Monocyte % : 6.2 %  Auto Eosinophil % : 3.0 %  Auto Basophil % : 0.2 %    12-06    145  |  111<H>  |  10.1  ----------------------------<  108<H>  3.9   |  27.0  |  0.39<L>    Ca    8.1<L>      06 Dec 2021 06:25  Phos  2.8     12-06  Mg     2.0     12-06                RADIOLOGY & ADDITIONAL STUDIES (The following images were personally reviewed):       EXAM:  XR ABDOMEN PORTABLE URGENT 1V                          PROCEDURE DATE:  11/27/2021          INTERPRETATION:  Clinical history: 61-year-old female, abdominal distention.    Portable view of the abdomen demonstrates marked bowel distention with no gross pneumoperitoneum or pneumatosis. Limited study.    IMPRESSION:  Marked bowel distention, limited study, follow-up CT can be ordered as clinically indicated    --- End of Report ---            MARCIAL THOMAS DO; Attending Radiologist  This document has been electronically signed. Nov 29 2021 10:26AM

## 2021-12-06 NOTE — PROGRESS NOTE ADULT - PROBLEM SELECTOR PLAN 1
Patient with sigmoid volvulus, now s/p sigmoidectomy. Colostomy bag with brown liquid stool, normal bowel sounds, no abdominal distension noted.   There is no evidence of any issues preventing enteral feeds therefore would recommend continuing with Dobbhoff for now rather than starting TPN for this patient. If the patient continue to be uncooperative and her mental status does not improve, we will consider placing a gastrostomy tube for enteral feeding if permission given by family. Patient with sigmoid volvulus, now s/p sigmoidectomy. Colostomy bag with brown liquid stool, normal bowel sounds, no abdominal distension noted.   There is no evidence of any issues preventing enteral feeds therefore would recommend continuing with Dobbhoff for now rather than starting TPN for this patient. If the patient continue to be uncooperative and her mental status does not improve, we will consider placing a gastrostomy tube for enteral feeding if permission given by family.  -Continue Vital @ 20cc/hr until goal via Dobbhoff tube  -Continue to trend CBC, CMP.

## 2021-12-06 NOTE — PROGRESS NOTE ADULT - ASSESSMENT
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 Due to likelihood of recurrence, surgical intervention recommended. Now s/p open sigmoidectomy with ostomy creation. Patient progressing well with functioning ostomy however not tolerating PO.   Albumin 2.1 and Pre-albumin 8.    PLAN:    - Pain control MMD   - Monitor ostomy o/p   - Duboff today, will start tube feeds.  - Monitor lytes, replete PRN   - Monitor lower extremity edema

## 2021-12-06 NOTE — CHART NOTE - NSCHARTNOTEFT_GEN_A_CORE
Source: Patient [ ]  Family [ ]   other [ x]    Current Diet: Diet, NPO with Tube Feed:   Tube Feeding Modality: Nasogastric  Vital 1.5 [place] (VITAL1.5)  Total Volume for 24 Hours (mL): 720  Continuous  Starting Tube Feed Rate {mL per Hour}: 10  Increase Tube Feed Rate by (mL): 10     Every 2 hours  Until Goal Tube Feed Rate (mL per Hour): 30  Tube Feed Duration (in Hours): 24  Tube Feed Start Time: 03:00  Free Water Flush  Bolus   Total Volume per Flush (mL): 200   Frequency: Every 4 Hours (12-06-21 @ 02:14)    Enteral /Parenteral Nutrition: Tube feeds running at goal rate of 30 ml/hr (x20 hrs) to provide 600 ml, 900 kcal, 41g protein, 458 ml free water; also receiving an additional 200 ml free water q4 hrs.     Current Weight:   (11/30) 106.2 lbs  (11/29) 141 lbs  ? accuracy of weights, noted with 3+ b/l foot edema, continue to trend and maintain strict Is&Os     Pertinent Medications: MEDICATIONS  (STANDING):  cholecalciferol 2000 Unit(s) Oral daily  dextrose 40% Gel 15 Gram(s) Oral once  dextrose 5% + lactated ringers. 1000 milliLiter(s) (75 mL/Hr) IV Continuous <Continuous>  dextrose 5%. 1000 milliLiter(s) (100 mL/Hr) IV Continuous <Continuous>  dextrose 5%. 1000 milliLiter(s) (50 mL/Hr) IV Continuous <Continuous>  dextrose 50% Injectable 25 Gram(s) IV Push once  dextrose 50% Injectable 25 Gram(s) IV Push once  dextrose 50% Injectable 12.5 Gram(s) IV Push once  enoxaparin Injectable 40 milliGRAM(s) SubCutaneous daily  escitalopram 5 milliGRAM(s) Oral daily  fluticasone propionate 50 MICROgram(s)/spray Nasal Spray 1 Spray(s) Both Nostrils every 12 hours  glucagon  Injectable 1 milliGRAM(s) IntraMuscular once  insulin lispro (ADMELOG) corrective regimen sliding scale   SubCutaneous every 6 hours  lactobacillus acidophilus 1 Tablet(s) Oral daily  levothyroxine Injectable 12.5 MICROGram(s) IV Push <User Schedule>  loratadine 10 milliGRAM(s) Oral daily  mesalamine DR Capsule 800 milliGRAM(s) Oral every 12 hours  mesalamine ER Capsule 375 milliGRAM(s) Oral daily  polyethylene glycol 3350 17 Gram(s) Oral daily  potassium chloride  10 mEq/100 mL IVPB 10 milliEquivalent(s) IV Intermittent every 1 hour  potassium chloride  10 mEq/100 mL IVPB 10 milliEquivalent(s) IV Intermittent every 1 hour    MEDICATIONS  (PRN):  ALBUTerol    90 MICROgram(s) HFA Inhaler 2 Puff(s) Inhalation every 6 hours PRN Shortness of Breath and/or Wheezing  aluminum hydroxide/magnesium hydroxide/simethicone Suspension 30 milliLiter(s) Oral every 4 hours PRN Dyspepsia  melatonin 3 milliGRAM(s) Oral at bedtime PRN Insomnia    Pertinent Labs: CBC Full  -  ( 06 Dec 2021 06:25 )  WBC Count : 9.13 K/uL  RBC Count : 4.46 M/uL  Hemoglobin : 12.7 g/dL  Hematocrit : 39.5 %  Platelet Count - Automated : 400 K/uL  Mean Cell Volume : 88.6 fl  Mean Cell Hemoglobin : 28.5 pg  Mean Cell Hemoglobin Concentration : 32.2 gm/dL  Auto Neutrophil # : 7.13 K/uL  Auto Lymphocyte # : 1.06 K/uL  Auto Monocyte # : 0.57 K/uL  Auto Eosinophil # : 0.27 K/uL  Auto Basophil # : 0.02 K/uL  Auto Neutrophil % : 78.1 %  Auto Lymphocyte % : 11.6 %  Auto Monocyte % : 6.2 %  Auto Eosinophil % : 3.0 %  Auto Basophil % : 0.2 %    12-06 Na145 mmol/L Glu 108 mg/dL<H> K+ 3.9 mmol/L Cr  0.39 mg/dL<L> BUN 10.1 mg/dL Phos 2.8 mg/dL Alb n/a   PAB n/a       Skin: Surgical incision to abdomen and IAD per documentation    Nutrition focused physical exam previously conducted - found signs of malnutrition [ ]absent [ x]present    Subcutaneous fat loss: [ x] Orbital fat pads region, [ x]Buccal fat region, [ x]Triceps region,  [ ]Ribs region    Muscle wasting: [ x]Temples region, [x ]Clavicle region, [ ]Shoulder region, [ ]Scapula region, [ ]Interosseous region,  [ ]thigh region, [ ]Calf region    Estimated Needs:   [x ] no change since previous assessment  [ ] recalculated:     Current Nutrition Diagnosis: Pt remains at high nutrition risk secondary to malnutrition (severe, acute on chronic) related to inability to meet sufficient protein-energy in setting of MR, dysphagia, poor skin integrity, 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 Aware seen by SLP 12/4 with recommendations for NPO; pts baseline diet is pureed with nectar liquids. Dobbhoff now placed, Vital 1.5 running at goal rate of 30 ml/hr. RD to follow up.    Recommendations:   1) Suggest increase tube feeds to goal rate of 45 ml/hr (x20 hrs) to provide 900 ml, 1350 kcal, 61g protein, 688 ml free water; additional free water per MD discretion.  2) SLP swallow re-evaluation as feasible/appropriate.  3) Rx: MVI and vitamin C 500mg daily.  4) Monitor tube feed tolerance.  5) Obtain daily weights to monitor trends.     Monitoring and Evaluation:   [ ] PO intake [ x] Tolerance to diet prescription [X] Weights  [X] Follow up per protocol [X] Labs

## 2021-12-06 NOTE — PROGRESS NOTE ADULT - ATTENDING COMMENTS
Patient seen and examined.  Patient has a Dobbhoff tube in place.  Feeding in progress.  Patient is nonverbal.  Continue the tube feeding.  Call GI as needed.

## 2021-12-06 NOTE — PROGRESS NOTE ADULT - SUBJECTIVE AND OBJECTIVE BOX
HPI/OVERNIGHT EVENTS: Patient seen and examined at bedside this AM. No overnight events. non verbal.     Vital Signs Last 24 Hrs  T(C): 36.3 (05 Dec 2021 23:40), Max: 36.7 (05 Dec 2021 16:53)  T(F): 97.4 (05 Dec 2021 23:40), Max: 98.1 (05 Dec 2021 16:53)  HR: 70 (05 Dec 2021 23:40) (70 - 83)  BP: 125/79 (05 Dec 2021 23:40) (91/68 - 125/79)  BP(mean): --  RR: 18 (05 Dec 2021 23:40) (17 - 18)  SpO2: 94% (05 Dec 2021 23:40) (93% - 94%)    I&O's Detail    04 Dec 2021 07:01  -  05 Dec 2021 07:00  --------------------------------------------------------  IN:  Total IN: 0 mL    OUT:    Colostomy (mL): 350 mL    Voided (mL): 1450 mL  Total OUT: 1800 mL    Total NET: -1800 mL      05 Dec 2021 07:01  -  06 Dec 2021 02:10  --------------------------------------------------------  IN:    dextrose 5% + lactated ringers: 900 mL  Total IN: 900 mL    OUT:    Colostomy (mL): 625 mL  Total OUT: 625 mL    Total NET: 275 mL      GEN: patient resting comfortably in bed, in no acute distress  RESP: respirations are unlabored, no accessory muscle use, no conversational dyspnea  CVS: RRR  GI: Abdomen soft, non-tender, non-distended, no rebound tenderness / guarding, stoma pink and patent above skin, thick fecal fluid material in bag  MSK: lower extremity 1+ pitting edema bilaterally    LABS:                        10.9   8.16  )-----------( 309      ( 04 Dec 2021 06:16 )             33.3     12-05    142  |  112<H>  |  8.6  ----------------------------<  93  5.0   |  21.0<L>  |  0.36<L>    Ca    7.9<L>      05 Dec 2021 11:40  Phos  3.3     12-05  Mg     1.9     12-05    TPro  x   /  Alb  2.1<L>  /  TBili  x   /  DBili  x   /  AST  x   /  ALT  x   /  AlkPhos  x   12-04          MEDICATIONS  (STANDING):  cholecalciferol 2000 Unit(s) Oral daily  dextrose 40% Gel 15 Gram(s) Oral once  dextrose 5% + lactated ringers. 1000 milliLiter(s) (75 mL/Hr) IV Continuous <Continuous>  dextrose 5%. 1000 milliLiter(s) (50 mL/Hr) IV Continuous <Continuous>  dextrose 5%. 1000 milliLiter(s) (100 mL/Hr) IV Continuous <Continuous>  dextrose 50% Injectable 25 Gram(s) IV Push once  dextrose 50% Injectable 12.5 Gram(s) IV Push once  dextrose 50% Injectable 25 Gram(s) IV Push once  enoxaparin Injectable 40 milliGRAM(s) SubCutaneous daily  escitalopram 5 milliGRAM(s) Oral daily  fluticasone propionate 50 MICROgram(s)/spray Nasal Spray 1 Spray(s) Both Nostrils every 12 hours  glucagon  Injectable 1 milliGRAM(s) IntraMuscular once  insulin lispro (ADMELOG) corrective regimen sliding scale   SubCutaneous every 6 hours  lactobacillus acidophilus 1 Tablet(s) Oral daily  levothyroxine Injectable 12.5 MICROGram(s) IV Push <User Schedule>  loratadine 10 milliGRAM(s) Oral daily  mesalamine DR Capsule 800 milliGRAM(s) Oral every 12 hours  mesalamine ER Capsule 375 milliGRAM(s) Oral daily  polyethylene glycol 3350 17 Gram(s) Oral daily  potassium chloride  10 mEq/100 mL IVPB 10 milliEquivalent(s) IV Intermittent every 1 hour  potassium chloride  10 mEq/100 mL IVPB 10 milliEquivalent(s) IV Intermittent every 1 hour    MEDICATIONS  (PRN):  ALBUTerol    90 MICROgram(s) HFA Inhaler 2 Puff(s) Inhalation every 6 hours PRN Shortness of Breath and/or Wheezing  aluminum hydroxide/magnesium hydroxide/simethicone Suspension 30 milliLiter(s) Oral every 4 hours PRN Dyspepsia  melatonin 3 milliGRAM(s) Oral at bedtime PRN Insomnia

## 2021-12-06 NOTE — CHART NOTE - NSCHARTNOTEFT_GEN_A_CORE
team notified by radiologist that cxr from 12/5 had evidence of possible pneumoperitoneum under right hemidiaphragm vs possible distended colon    on examination, patient was resting comfortably in bed, not in distress  abdomen was soft, nondistended. ostomy in left lower quadrant with stoma pink and viable. mild amount of brown output in ostomy bag    a/p  61 year old female s/p hartmanns 11/29 for sigmoid volvulus    -obtain upright CXR to evaluate for colon in RUQ vs pneumoperitoneum  -exam not consistent with free air team notified by radiologist that cxr from 12/5 had evidence of possible pneumoperitoneum under right hemidiaphragm vs possible distended colon    on examination, patient was resting comfortably in bed, not in distress  abdomen was soft, nondistended. ostomy in left lower quadrant with stoma pink and viable. mild amount of brown output in ostomy bag    a/p  61 year old female s/p hartmanns 11/29 for sigmoid volvulus    -will obtain ct abd/pelvis stat to confirm  -hold tube feeds

## 2021-12-06 NOTE — PROGRESS NOTE ADULT - ATTENDING COMMENTS
Patient had Dobhoff placed yesterday for feeding as she is not taking feeds when assisted by nursing team.  Ostomy is function and abdomen is soft, nondistended, and no rebound or guarding.  Per team discussions with patient's mother, would like to avoid feeding tube if possible.  To this end arrangements should be made for the staff from the patient's usual facility to come and assist with feeding.  Will revisit further goals of care after this.  From surgical standpoint patient needs to have better PO intake prior to discharge; otherwise she is doing fairly well.

## 2021-12-07 LAB
GLUCOSE BLDC GLUCOMTR-MCNC: 110 MG/DL — HIGH (ref 70–99)
GLUCOSE BLDC GLUCOMTR-MCNC: 82 MG/DL — SIGNIFICANT CHANGE UP (ref 70–99)
GLUCOSE BLDC GLUCOMTR-MCNC: 84 MG/DL — SIGNIFICANT CHANGE UP (ref 70–99)
GLUCOSE BLDC GLUCOMTR-MCNC: 86 MG/DL — SIGNIFICANT CHANGE UP (ref 70–99)
GLUCOSE BLDC GLUCOMTR-MCNC: 91 MG/DL — SIGNIFICANT CHANGE UP (ref 70–99)

## 2021-12-07 PROCEDURE — 74018 RADEX ABDOMEN 1 VIEW: CPT | Mod: 26

## 2021-12-07 PROCEDURE — 74176 CT ABD & PELVIS W/O CONTRAST: CPT | Mod: 26

## 2021-12-07 RX ADMIN — Medication 1 SPRAY(S): at 17:45

## 2021-12-07 RX ADMIN — Medication 1 SPRAY(S): at 05:16

## 2021-12-07 RX ADMIN — ENOXAPARIN SODIUM 40 MILLIGRAM(S): 100 INJECTION SUBCUTANEOUS at 13:06

## 2021-12-07 RX ADMIN — SODIUM CHLORIDE 75 MILLILITER(S): 9 INJECTION, SOLUTION INTRAVENOUS at 17:46

## 2021-12-07 RX ADMIN — Medication 12.5 MICROGRAM(S): at 05:16

## 2021-12-07 NOTE — PROGRESS NOTE ADULT - ATTENDING COMMENTS
Seen and examined.  CT A/P with IV contrast images and read reviewed.  CT A/P with po contrast images and read reviewed.  Large amount of ascites and pneumoperitoneum without source.  No extravasation noted from bowel.  Pt remains HD stable.  Restarted on TF.  Nonverbal and in NAD  incision clean, draining small amounts of serous fluid, soft, NTND, colostomy healthy and productive  No labs drawn today  POD 8  Continued uncertainty re: source of pneumoperitoneum. Remains HD stable with benign abdomen.   Will obtain f/u KUB to ensure absence of contrast extravasation from colon as contrast transits through to colostomy, though doubtful given nature of fluid seeping thru otherwise healthy midline incision.  For swallowing study in presence of NH staff tomorrow.  Discussed with mother Carley over the phone regarding potential for PEG placement if once again fails swallow and is not able to be encouraged to eat. She did not explicitly consent but will give the option some thought. Will communicate further with her if PEG appears necessary.

## 2021-12-07 NOTE — PROGRESS NOTE ADULT - SUBJECTIVE AND OBJECTIVE BOX
HPI/OVERNIGHT EVENTS: Patient seen and examined at bedside this AM. No overnight events. non verbal. Patient notably had a CXR for dobhoff placement which showed a large pneumoperitoneum, CT imaging with IV contrast did not show any evidence of rectal stump blowout or perforated viscus, a repeat CT with PO contrast was ordered to evaluated, patient was made NPO.    Vital Signs Last 24 Hrs  T(C): 36.4 (06 Dec 2021 20:37), Max: 37.3 (06 Dec 2021 10:59)  T(F): 97.5 (06 Dec 2021 20:37), Max: 99.1 (06 Dec 2021 10:59)  HR: 88 (06 Dec 2021 20:37) (68 - 88)  BP: 112/73 (06 Dec 2021 20:37) (112/73 - 128/80)  BP(mean): --  ABP: --  ABP(mean): --  RR: 18 (06 Dec 2021 20:37) (18 - 18)  SpO2: 94% (06 Dec 2021 20:37) (91% - 94%)    I&O's Detail    05 Dec 2021 07:01  -  06 Dec 2021 07:00  --------------------------------------------------------  IN:    dextrose 5% + lactated ringers: 900 mL    Vital1.5: 20 mL  Total IN: 920 mL    OUT:    Colostomy (mL): 675 mL    Voided (mL): 500 mL  Total OUT: 1175 mL    Total NET: -255 mL      06 Dec 2021 07:01  -  07 Dec 2021 02:05  --------------------------------------------------------  IN:    dextrose 5% + lactated ringers: 300 mL    Free Water: 100 mL    IV PiggyBack: 500 mL    Vital1.5: 250 mL  Total IN: 1150 mL    OUT:    Voided (mL): 550 mL  Total OUT: 550 mL    Total NET: 600 mL    GEN: patient resting comfortably in bed, in no acute distress  RESP: respirations are unlabored, no accessory muscle use, no conversational dyspnea  CVS: RRR  GI: Abdomen soft, non-tender, non-distended, no rebound tenderness / guarding, stoma pink and patent above skin, thick fecal fluid material in bag  MSK: lower extremity 1+ pitting edema bilaterally    .  LABS:                         12.7   9.13  )-----------( 400      ( 06 Dec 2021 06:25 )             39.5     12-06    145  |  111<H>  |  10.1  ----------------------------<  108<H>  3.9   |  27.0  |  0.39<L>    Ca    8.1<L>      06 Dec 2021 06:25  Phos  2.8     12-06  Mg     2.0     12-06                RADIOLOGY, EKG & ADDITIONAL TESTS: Reviewed.

## 2021-12-07 NOTE — PROGRESS NOTE ADULT - ASSESSMENT
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 Due to likelihood of recurrence, surgical intervention recommended. Now s/p open sigmoidectomy with ostomy creation. Patient progressing well with functioning ostomy however not tolerating PO.   Albumin 2.1 and Pre-albumin 8. A dobhoff was placed for nutritional support, incidentally discovered to have a pneumoperitoneum after work up.  CT imaging with IV contrast confirmed pneumperitoneum and ascites, no evidence of perforated viscus but that assessment could not be ruled out.      PLAN:    - Pain control MM   - Monitor ostomy o/p   - TF held, NPO for work up   - FU CT Abdomen with PO contrast   - Monitor lytes, replete PRN   - Monitor lower extremity edema

## 2021-12-08 LAB
ANION GAP SERPL CALC-SCNC: 10 MMOL/L — SIGNIFICANT CHANGE UP (ref 5–17)
APTT BLD: 25.7 SEC — LOW (ref 27.5–35.5)
BASOPHILS # BLD AUTO: 0.01 K/UL — SIGNIFICANT CHANGE UP (ref 0–0.2)
BASOPHILS NFR BLD AUTO: 0.1 % — SIGNIFICANT CHANGE UP (ref 0–2)
BLD GP AB SCN SERPL QL: SIGNIFICANT CHANGE UP
BUN SERPL-MCNC: 9.4 MG/DL — SIGNIFICANT CHANGE UP (ref 8–20)
CALCIUM SERPL-MCNC: 8.4 MG/DL — LOW (ref 8.6–10.2)
CHLORIDE SERPL-SCNC: 104 MMOL/L — SIGNIFICANT CHANGE UP (ref 98–107)
CO2 SERPL-SCNC: 28 MMOL/L — SIGNIFICANT CHANGE UP (ref 22–29)
CREAT SERPL-MCNC: 0.43 MG/DL — LOW (ref 0.5–1.3)
EOSINOPHIL # BLD AUTO: 0.24 K/UL — SIGNIFICANT CHANGE UP (ref 0–0.5)
EOSINOPHIL NFR BLD AUTO: 2.9 % — SIGNIFICANT CHANGE UP (ref 0–6)
GLUCOSE BLDC GLUCOMTR-MCNC: 56 MG/DL — LOW (ref 70–99)
GLUCOSE BLDC GLUCOMTR-MCNC: 64 MG/DL — LOW (ref 70–99)
GLUCOSE BLDC GLUCOMTR-MCNC: 68 MG/DL — LOW (ref 70–99)
GLUCOSE BLDC GLUCOMTR-MCNC: 70 MG/DL — SIGNIFICANT CHANGE UP (ref 70–99)
GLUCOSE BLDC GLUCOMTR-MCNC: 79 MG/DL — SIGNIFICANT CHANGE UP (ref 70–99)
GLUCOSE BLDC GLUCOMTR-MCNC: 80 MG/DL — SIGNIFICANT CHANGE UP (ref 70–99)
GLUCOSE BLDC GLUCOMTR-MCNC: 80 MG/DL — SIGNIFICANT CHANGE UP (ref 70–99)
GLUCOSE BLDC GLUCOMTR-MCNC: 99 MG/DL — SIGNIFICANT CHANGE UP (ref 70–99)
GLUCOSE SERPL-MCNC: 124 MG/DL — HIGH (ref 70–99)
HCT VFR BLD CALC: 42.6 % — SIGNIFICANT CHANGE UP (ref 34.5–45)
HGB BLD-MCNC: 13.6 G/DL — SIGNIFICANT CHANGE UP (ref 11.5–15.5)
IMM GRANULOCYTES NFR BLD AUTO: 0.6 % — SIGNIFICANT CHANGE UP (ref 0–1.5)
INR BLD: 1.05 RATIO — SIGNIFICANT CHANGE UP (ref 0.88–1.16)
LYMPHOCYTES # BLD AUTO: 0.99 K/UL — LOW (ref 1–3.3)
LYMPHOCYTES # BLD AUTO: 12.1 % — LOW (ref 13–44)
MAGNESIUM SERPL-MCNC: 2.1 MG/DL — SIGNIFICANT CHANGE UP (ref 1.8–2.6)
MCHC RBC-ENTMCNC: 28.3 PG — SIGNIFICANT CHANGE UP (ref 27–34)
MCHC RBC-ENTMCNC: 31.9 GM/DL — LOW (ref 32–36)
MCV RBC AUTO: 88.8 FL — SIGNIFICANT CHANGE UP (ref 80–100)
MONOCYTES # BLD AUTO: 0.48 K/UL — SIGNIFICANT CHANGE UP (ref 0–0.9)
MONOCYTES NFR BLD AUTO: 5.9 % — SIGNIFICANT CHANGE UP (ref 2–14)
NEUTROPHILS # BLD AUTO: 6.38 K/UL — SIGNIFICANT CHANGE UP (ref 1.8–7.4)
NEUTROPHILS NFR BLD AUTO: 78.4 % — HIGH (ref 43–77)
PHOSPHATE SERPL-MCNC: 2.7 MG/DL — SIGNIFICANT CHANGE UP (ref 2.4–4.7)
PLATELET # BLD AUTO: 413 K/UL — HIGH (ref 150–400)
POTASSIUM SERPL-MCNC: 4.1 MMOL/L — SIGNIFICANT CHANGE UP (ref 3.5–5.3)
POTASSIUM SERPL-SCNC: 4.1 MMOL/L — SIGNIFICANT CHANGE UP (ref 3.5–5.3)
PROTHROM AB SERPL-ACNC: 12.1 SEC — SIGNIFICANT CHANGE UP (ref 10.6–13.6)
RBC # BLD: 4.8 M/UL — SIGNIFICANT CHANGE UP (ref 3.8–5.2)
RBC # FLD: 14.2 % — SIGNIFICANT CHANGE UP (ref 10.3–14.5)
SODIUM SERPL-SCNC: 142 MMOL/L — SIGNIFICANT CHANGE UP (ref 135–145)
SURGICAL PATHOLOGY STUDY: SIGNIFICANT CHANGE UP
WBC # BLD: 8.15 K/UL — SIGNIFICANT CHANGE UP (ref 3.8–10.5)
WBC # FLD AUTO: 8.15 K/UL — SIGNIFICANT CHANGE UP (ref 3.8–10.5)

## 2021-12-08 PROCEDURE — 74018 RADEX ABDOMEN 1 VIEW: CPT | Mod: 26

## 2021-12-08 PROCEDURE — 71045 X-RAY EXAM CHEST 1 VIEW: CPT | Mod: 26

## 2021-12-08 RX ORDER — SODIUM CHLORIDE 9 MG/ML
1000 INJECTION, SOLUTION INTRAVENOUS
Refills: 0 | Status: DISCONTINUED | OUTPATIENT
Start: 2021-12-08 | End: 2021-12-20

## 2021-12-08 RX ORDER — SODIUM CHLORIDE 9 MG/ML
500 INJECTION, SOLUTION INTRAVENOUS ONCE
Refills: 0 | Status: COMPLETED | OUTPATIENT
Start: 2021-12-08 | End: 2021-12-08

## 2021-12-08 RX ORDER — DEXTROSE 50 % IN WATER 50 %
12.5 SYRINGE (ML) INTRAVENOUS ONCE
Refills: 0 | Status: COMPLETED | OUTPATIENT
Start: 2021-12-08 | End: 2021-12-08

## 2021-12-08 RX ORDER — DEXTROSE 50 % IN WATER 50 %
25 SYRINGE (ML) INTRAVENOUS ONCE
Refills: 0 | Status: COMPLETED | OUTPATIENT
Start: 2021-12-08 | End: 2021-12-08

## 2021-12-08 RX ADMIN — Medication 12.5 GRAM(S): at 12:14

## 2021-12-08 RX ADMIN — Medication 2000 UNIT(S): at 11:46

## 2021-12-08 RX ADMIN — Medication 12.5 MICROGRAM(S): at 05:16

## 2021-12-08 RX ADMIN — SODIUM CHLORIDE 3000 MILLILITER(S): 9 INJECTION, SOLUTION INTRAVENOUS at 07:16

## 2021-12-08 RX ADMIN — Medication 25 GRAM(S): at 02:06

## 2021-12-08 RX ADMIN — SODIUM CHLORIDE 500 MILLILITER(S): 9 INJECTION, SOLUTION INTRAVENOUS at 00:59

## 2021-12-08 RX ADMIN — Medication 800 MILLIGRAM(S): at 19:30

## 2021-12-08 RX ADMIN — Medication 1 TABLET(S): at 11:47

## 2021-12-08 RX ADMIN — ENOXAPARIN SODIUM 40 MILLIGRAM(S): 100 INJECTION SUBCUTANEOUS at 11:46

## 2021-12-08 RX ADMIN — SODIUM CHLORIDE 100 MILLILITER(S): 9 INJECTION, SOLUTION INTRAVENOUS at 11:46

## 2021-12-08 RX ADMIN — Medication 1 SPRAY(S): at 05:17

## 2021-12-08 RX ADMIN — Medication 12.5 GRAM(S): at 14:44

## 2021-12-08 RX ADMIN — ESCITALOPRAM OXALATE 5 MILLIGRAM(S): 10 TABLET, FILM COATED ORAL at 11:47

## 2021-12-08 RX ADMIN — LORATADINE 10 MILLIGRAM(S): 10 TABLET ORAL at 11:47

## 2021-12-08 RX ADMIN — POLYETHYLENE GLYCOL 3350 17 GRAM(S): 17 POWDER, FOR SOLUTION ORAL at 11:46

## 2021-12-08 NOTE — PROGRESS NOTE ADULT - SUBJECTIVE AND OBJECTIVE BOX
Colorectal Surgery Progress Note:    Patient with abdominal KUB that revealed a largely distended stomach.  Discussion over the phone with Carley (patient's mother took place) and verbalized benefit for NGT for gastric decompression.  Mother stated that was ok however she was very overwhelmed with the information.  She asked me to call niece Meche Walls- called three times, no answer.  Left a voicemail.    Patient had Dobhoff removed and NGT placed, with tolerance of procedure.  It was noted that although ostomy site to LLQ has fecal fluid output, the midline incision as well has been leaking clear/sanguinous fluid.      Patient remains afebrile, VSS. No n/v/f/c/cp/sob.     Diet, NPO (12-06-21 @ 18:37)      Scheduled Medications:   cholecalciferol 2000 Unit(s) Oral daily  dextrose 40% Gel 15 Gram(s) Oral once  dextrose 5% + lactated ringers. 1000 milliLiter(s) (100 mL/Hr) IV Continuous <Continuous>  dextrose 5%. 1000 milliLiter(s) (50 mL/Hr) IV Continuous <Continuous>  dextrose 5%. 1000 milliLiter(s) (100 mL/Hr) IV Continuous <Continuous>  dextrose 50% Injectable 25 Gram(s) IV Push once  dextrose 50% Injectable 12.5 Gram(s) IV Push once  dextrose 50% Injectable 25 Gram(s) IV Push once  enoxaparin Injectable 40 milliGRAM(s) SubCutaneous daily  escitalopram 5 milliGRAM(s) Oral daily  fluticasone propionate 50 MICROgram(s)/spray Nasal Spray 1 Spray(s) Both Nostrils every 12 hours  glucagon  Injectable 1 milliGRAM(s) IntraMuscular once  insulin lispro (ADMELOG) corrective regimen sliding scale   SubCutaneous every 6 hours  lactobacillus acidophilus 1 Tablet(s) Oral daily  levothyroxine Injectable 12.5 MICROGram(s) IV Push <User Schedule>  loratadine 10 milliGRAM(s) Oral daily  mesalamine DR Capsule 800 milliGRAM(s) Oral every 12 hours  polyethylene glycol 3350 17 Gram(s) Oral daily    PRN Medications:  ALBUTerol    90 MICROgram(s) HFA Inhaler 2 Puff(s) Inhalation every 6 hours PRN Shortness of Breath and/or Wheezing  aluminum hydroxide/magnesium hydroxide/simethicone Suspension 30 milliLiter(s) Oral every 4 hours PRN Dyspepsia      Objective:   T(F): 97.5 (12-07 @ 20:27), Max: 97.6 (12-07 @ 15:50)  HR: 73 (12-07 @ 20:27) (60 - 73)  BP: 101/66 (12-07 @ 20:27) (101/66 - 121/82)  BP(mean): --  ABP: --  ABP(mean): --  RR: 18 (12-07 @ 20:27) (18 - 18)  SpO2: 93% (12-07 @ 20:27) (91% - 98%)      Physical Exam:   GEN: patient resting comfortably in bed, in no acute distress, NGT intact  RESP: respirations are unlabored, no accessory muscle use, no conversational dyspnea  CVS: RRR  GI: Abdomen soft, non-tender; midline incision with sanguinous output, ostomy with fecal fluid output; no rebound tenderness / guarding; abdominal binder in place to hold dressings intact  MSK: extremities contracted per baseline, in fetal position    I&O's    12-06 @ 07:01  -  12-07 @ 07:00  --------------------------------------------------------  IN:    dextrose 5% + lactated ringers: 300 mL    Free Water: 100 mL    IV PiggyBack: 500 mL    Vital1.5: 250 mL  Total IN: 1150 mL    OUT:    Colostomy (mL): 250 mL    Voided (mL): 1050 mL  Total OUT: 1300 mL    Total NET: -150 mL      12-07 @ 07:01  -  12-08 @ 02:57  --------------------------------------------------------  IN:    dextrose 5% + lactated ringers: 750 mL    Free Water: 200 mL  Total IN: 950 mL    OUT:    Colostomy (mL): 500 mL    Drain (mL): 1000 mL    Oral Fluid: 0 mL    Vital1.5: 0 mL    Voided (mL): 1050 mL  Total OUT: 2550 mL    Total NET: -1600 mL          LABS:                        12.7   9.13  )-----------( 400      ( 06 Dec 2021 06:25 )             39.5     12-06    145  |  111<H>  |  10.1  ----------------------------<  108<H>  3.9   |  27.0  |  0.39<L>    Ca    8.1<L>      06 Dec 2021 06:25  Phos  2.8     12-06  Mg     2.0     12-06            MICROBIOLOGY:       PATHOLOGY:      RADIOLOGY:  CT A/P 12/7  Large amount of ascites and intra-abdominal free air; ascites slightly increased since 12/26/2021; free air is greater than expected for a patient who is status post surgery 11/29/2021; the source of the free air is uncertain.    No extravasation of oral contrast from the small bowel or opacified cecum.    Diffuse wall thickening involving the colon, wall thickening involving the rectal stump, and areas of small bowel thickening.    Cluster of droplets of air in the left lower quadrant which may be free air, however mesenteric venous air cannot be excluded; clinical correlation is recommended for ischemia including with a lactate level.    Bilateral striated nephrograms which may reflect infection or renal failure; clinical correlation is recommended.    Bladder wall thickening; correlation is recommended for cystitis.    A preliminary report was provided by [facility] and the findings in the final report were discussed with Dr. Jennings at 12:16 PM on 12/7/2021.

## 2021-12-08 NOTE — PROGRESS NOTE ADULT - ATTENDING COMMENTS
Patient continues to have leakage from ascites which is clear, serosanguinous in character.  Colostomy functional and putting out stool.  NGT in place for decompression - output in tubing is entirely clear at the time of exam.  Abdominal binder had slip upward towards chest.  Based on findings it appears that the pneumoperitoneum is not from perforated viscus but may be due to the dehiscence of her midline wound.  Exam remains benign.    Speech/swallow eval pending - if patient does not pass and NGT still required for feeding access, can initiate trickle feeds today.  Continue DVT prophylaxis.  Ensure that abdominal binder remains in place around the abdomen; drainage currently manage with ostomy appliance at midline.

## 2021-12-08 NOTE — PROGRESS NOTE ADULT - ASSESSMENT
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 Due to likelihood of recurrence, surgical intervention recommended. Now s/p open sigmoidectomy with ostomy creation. Patient progressing well with functioning ostomy however not tolerating PO.   Albumin 2.1 and Pre-albumin 8. A dobhoff was placed for nutritional support, incidentally discovered to have a pneumoperitoneum after work up.  CT imaging with IV contrast confirmed pneumperitoneum and ascites, no evidence of perforated viscus but that assessment could not be ruled out.      PLAN:    - Review CT Abdomen with PO contrast as evidence of fascial dehiscence may be present since patient's midline incision is producing fluid   - Pain control MM   - Monitor ostomy o/p   - TF held, NPO for work up   - Monitor NGT  - Monitor lytes, replete PRN   - Monitor lower extremity edema  - FU labs, coags, T&S  - Call family

## 2021-12-09 LAB
ANION GAP SERPL CALC-SCNC: 8 MMOL/L — SIGNIFICANT CHANGE UP (ref 5–17)
BASOPHILS # BLD AUTO: 0.03 K/UL — SIGNIFICANT CHANGE UP (ref 0–0.2)
BASOPHILS NFR BLD AUTO: 0.2 % — SIGNIFICANT CHANGE UP (ref 0–2)
BUN SERPL-MCNC: 8.7 MG/DL — SIGNIFICANT CHANGE UP (ref 8–20)
CALCIUM SERPL-MCNC: 8.5 MG/DL — LOW (ref 8.6–10.2)
CHLORIDE SERPL-SCNC: 103 MMOL/L — SIGNIFICANT CHANGE UP (ref 98–107)
CO2 SERPL-SCNC: 28 MMOL/L — SIGNIFICANT CHANGE UP (ref 22–29)
CREAT SERPL-MCNC: 0.42 MG/DL — LOW (ref 0.5–1.3)
EOSINOPHIL # BLD AUTO: 0.15 K/UL — SIGNIFICANT CHANGE UP (ref 0–0.5)
EOSINOPHIL NFR BLD AUTO: 1.1 % — SIGNIFICANT CHANGE UP (ref 0–6)
GLUCOSE BLDC GLUCOMTR-MCNC: 82 MG/DL — SIGNIFICANT CHANGE UP (ref 70–99)
GLUCOSE BLDC GLUCOMTR-MCNC: 87 MG/DL — SIGNIFICANT CHANGE UP (ref 70–99)
GLUCOSE BLDC GLUCOMTR-MCNC: 95 MG/DL — SIGNIFICANT CHANGE UP (ref 70–99)
GLUCOSE SERPL-MCNC: 122 MG/DL — HIGH (ref 70–99)
HCT VFR BLD CALC: 41.2 % — SIGNIFICANT CHANGE UP (ref 34.5–45)
HGB BLD-MCNC: 13.3 G/DL — SIGNIFICANT CHANGE UP (ref 11.5–15.5)
IMM GRANULOCYTES NFR BLD AUTO: 0.5 % — SIGNIFICANT CHANGE UP (ref 0–1.5)
LYMPHOCYTES # BLD AUTO: 0.74 K/UL — LOW (ref 1–3.3)
LYMPHOCYTES # BLD AUTO: 5.2 % — LOW (ref 13–44)
MAGNESIUM SERPL-MCNC: 2.1 MG/DL — SIGNIFICANT CHANGE UP (ref 1.8–2.6)
MCHC RBC-ENTMCNC: 28.7 PG — SIGNIFICANT CHANGE UP (ref 27–34)
MCHC RBC-ENTMCNC: 32.3 GM/DL — SIGNIFICANT CHANGE UP (ref 32–36)
MCV RBC AUTO: 88.8 FL — SIGNIFICANT CHANGE UP (ref 80–100)
MONOCYTES # BLD AUTO: 0.8 K/UL — SIGNIFICANT CHANGE UP (ref 0–0.9)
MONOCYTES NFR BLD AUTO: 5.6 % — SIGNIFICANT CHANGE UP (ref 2–14)
NEUTROPHILS # BLD AUTO: 12.38 K/UL — HIGH (ref 1.8–7.4)
NEUTROPHILS NFR BLD AUTO: 87.4 % — HIGH (ref 43–77)
PHOSPHATE SERPL-MCNC: 2.7 MG/DL — SIGNIFICANT CHANGE UP (ref 2.4–4.7)
PLATELET # BLD AUTO: 463 K/UL — HIGH (ref 150–400)
POTASSIUM SERPL-MCNC: 4 MMOL/L — SIGNIFICANT CHANGE UP (ref 3.5–5.3)
POTASSIUM SERPL-SCNC: 4 MMOL/L — SIGNIFICANT CHANGE UP (ref 3.5–5.3)
RBC # BLD: 4.64 M/UL — SIGNIFICANT CHANGE UP (ref 3.8–5.2)
RBC # FLD: 14.3 % — SIGNIFICANT CHANGE UP (ref 10.3–14.5)
SODIUM SERPL-SCNC: 139 MMOL/L — SIGNIFICANT CHANGE UP (ref 135–145)
WBC # BLD: 14.17 K/UL — HIGH (ref 3.8–10.5)
WBC # FLD AUTO: 14.17 K/UL — HIGH (ref 3.8–10.5)

## 2021-12-09 RX ADMIN — Medication 2000 UNIT(S): at 12:31

## 2021-12-09 RX ADMIN — Medication 62.5 MILLIMOLE(S): at 09:00

## 2021-12-09 RX ADMIN — Medication 1 TABLET(S): at 12:29

## 2021-12-09 RX ADMIN — ESCITALOPRAM OXALATE 5 MILLIGRAM(S): 10 TABLET, FILM COATED ORAL at 12:29

## 2021-12-09 RX ADMIN — Medication 12.5 MICROGRAM(S): at 06:07

## 2021-12-09 RX ADMIN — ENOXAPARIN SODIUM 40 MILLIGRAM(S): 100 INJECTION SUBCUTANEOUS at 12:28

## 2021-12-09 RX ADMIN — Medication 800 MILLIGRAM(S): at 17:53

## 2021-12-09 RX ADMIN — Medication 800 MILLIGRAM(S): at 06:07

## 2021-12-09 RX ADMIN — LORATADINE 10 MILLIGRAM(S): 10 TABLET ORAL at 12:29

## 2021-12-09 NOTE — PROGRESS NOTE ADULT - SUBJECTIVE AND OBJECTIVE BOX
Acute Care Surgery/Trauma Surgery Progress Note:      No acute overnight events. Patient afebrile, and hemodynamically competetn. NPO diet. Denies n/v/f/c/cp/sob.     Diet, NPO with Tube Feed:   Tube Feeding Modality: Nasogastric  Pivot 1.5 [place] (PIVOT1.5RTH)  Total Volume for 24 Hours (mL): 240  Continuous  Starting Tube Feed Rate mL per Hour: 10  Until Goal Tube Feed Rate (mL per Hour): 10  Tube Feed Duration (in Hours): 24  Tube Feed Start Time: 13:30 (12-08-21 @ 13:27)      Scheduled Medications:   cholecalciferol 2000 Unit(s) Oral daily  dextrose 40% Gel 15 Gram(s) Oral once  dextrose 5% + lactated ringers. 1000 milliLiter(s) (100 mL/Hr) IV Continuous <Continuous>  dextrose 5%. 1000 milliLiter(s) (50 mL/Hr) IV Continuous <Continuous>  dextrose 5%. 1000 milliLiter(s) (100 mL/Hr) IV Continuous <Continuous>  dextrose 50% Injectable 25 Gram(s) IV Push once  dextrose 50% Injectable 12.5 Gram(s) IV Push once  dextrose 50% Injectable 25 Gram(s) IV Push once  enoxaparin Injectable 40 milliGRAM(s) SubCutaneous daily  escitalopram 5 milliGRAM(s) Oral daily  fluticasone propionate 50 MICROgram(s)/spray Nasal Spray 1 Spray(s) Both Nostrils every 12 hours  glucagon  Injectable 1 milliGRAM(s) IntraMuscular once  insulin lispro (ADMELOG) corrective regimen sliding scale   SubCutaneous every 6 hours  lactobacillus acidophilus 1 Tablet(s) Oral daily  levothyroxine Injectable 12.5 MICROGram(s) IV Push <User Schedule>  loratadine 10 milliGRAM(s) Oral daily  mesalamine DR Capsule 800 milliGRAM(s) Oral every 12 hours  polyethylene glycol 3350 17 Gram(s) Oral daily  sodium phosphate IVPB 15 milliMole(s) IV Intermittent once    PRN Medications:  ALBUTerol    90 MICROgram(s) HFA Inhaler 2 Puff(s) Inhalation every 6 hours PRN Shortness of Breath and/or Wheezing  aluminum hydroxide/magnesium hydroxide/simethicone Suspension 30 milliLiter(s) Oral every 4 hours PRN Dyspepsia      Objective:   T(F): 97.7 (12-09 @ 04:38), Max: 98.6 (12-08 @ 17:09)  HR: 82 (12-09 @ 04:38) (68 - 82)  BP: 116/69 (12-09 @ 04:38) (94/60 - 116/69)  BP(mean): --  ABP: --  ABP(mean): --  RR: 18 (12-09 @ 04:38) (18 - 18)  SpO2: 94% (12-09 @ 04:38) (93% - 94%)      Physical Exam:   GEN: patient resting in bed, in no acute distress,&  NGT intact.  RESP: respirations are unlabored, no accessory muscle use, no conversational dyspnea  CVS: RRR  GI: Abdomen soft, non-tender; midline incision with sanguinous output, ostomy with fecal fluid output; no rebound tenderness / guarding; abdominal binder in place to hold dressings intact  MSK: extremities in flexion per baseline.     I&O's    12-08 @ 07:01  -  12-09 @ 07:00  --------------------------------------------------------  IN:    dextrose 5% + lactated ringers: 2300 mL    Free Water: 50 mL    Vital1.5: 110 mL  Total IN: 2460 mL    OUT:    Colostomy (mL): 100 mL    Nasogastric/Oral tube (mL): 300 mL    Voided (mL): 400 mL  Total OUT: 800 mL    Total NET: 1660 mL    LABS:                        13.3   14.17 )-----------( 463      ( 09 Dec 2021 06:07 )             41.2     12-09    139  |  103  |  8.7  ----------------------------<  122<H>  4.0   |  28.0  |  0.42<L>    Ca    8.5<L>      09 Dec 2021 06:07  Phos  2.7     12-09  Mg     2.1     12-09      PT/INR - ( 08 Dec 2021 06:28 )   PT: 12.1 sec;   INR: 1.05 ratio      PTT - ( 08 Dec 2021 06:28 )  PTT:25.7 sec  MICROBIOLOGY:       PATHOLOGY:  Surgical Pathology Report:   ACCESSION No:  [id number]      Accession:                             [id number]    Collected Date/Time:                   11/29/2021 17:00 EST  Received Date/Time:                    11/30/2021 11:34 EST    Surgical Pathology Report - Auth (Verified)    Specimen(s) Submitted  1  Sigmoid colon  2  Portion sigmoid colon    Final Diagnosis    1. Sigmoid colon, segmental colectomy ( length = 57.5 cm)  - Volulus with acute colitis and ischemic colitis changes    - Negative for chronic changes of inflammatory bowel disease,  dysplasia,  or malignancy  - Lymph node x 6 with sinus histiocytosis    2. Sigmoid colon, segmental colectomy ( length = 3.5 cm)  - Acute colitis with marked mural edema  - Negative for chronic changes of inflammatorybowel disease,  dysplasia,  or malignancy  - Lymph node x 1 , unremarkable  Verified by: Dion Murphy M.D.  (Electronic Signature)  Reported on: 12/08/21 18:31 [facility], [facility], [address] [place] [id number]  _________________________________________________________________      Clinical Information  Sigmoid volvulus    Gross Description  1.  Received: Fresh labeled  "sigmoid colon" , consisting of an unoriented  dilated twisted bowel segment  Integrity:  Intact, dilated  Length:  57.5 g  Luminal Circumference:   Ranging from 12 cm to 20 cm  Wall:  0.5 cm, 0.7 cm  Serosa:  Glistening pink tan and smooth with petechia  Mucosa:  Pink-tan, and focally hemorrhagic, with few areas of tan filmy  material adherent to the mucosal surface.  Attached Fat:  The attached fat is glistening, partly covered by smooth  pink white soft membranous tissue. The fat appears focally slightly  edematous and focally hemorrhagic with prominent appearing vessels with  thicknened vascular walls. There are several tan rubbery lymphoid nodules  identified.  Additional Findings:   There is a separate piece of bowel in the  container,  measuring 7.5 x 1.5 x 1.5 cm, with a purple tan smooth focally  hemorrhagic serosa and pink-tan and unremarkable mucosa.  Submitted:  Representative sections in 7 cassettes:  1A-1C: Representaitve bowel  1D: Representative fat  1E: Representative fat and blood vessels  1F: Representative lymph nodes  1G: Representative separate piece    2.  Received: In formalin labeled  "portion of sigmoid colon" ,  consisting of  an unoriented segment of bowel, with open ends.  Dimensions:  3.5 cm in length and 8.5 cm in circumference  Additional Comments:   The mucosa is dark red tan, with an irregular tan  filmy area identified at the mucosal surface, which abuts both ends. One  end is inked black and the opposite end is inked blue. The bowel wall    ranges from 0.5-0.9 cm in thickness.  Submitted:  Representative tissue in two cassettes: 2A-2B    Ana Yap 12/01/2021 11:25 AM    Disclaimer  In addition to other data that may appear on the specimen containers, all  labels have been inspected to confirm the presence of the patient's name  and date of birth.    Histological Processing Performed at [facility],  Department of Pathology, [address] [place] [address]. (11-29-21 @ 17:00)    A:   61y old female with severe developmental delay, nonverbal at baseline transferred for sigmoid volvulus s/p sigmoid decompression.   Due to likelihood of recurrence, surgical intervention recommended.   Now s/p open sigmoidectomy with ostomy creation. Patient progressing well with functioning ostomy however not tolerating PO.   Placed for nutritional support, incidentally discovered to have a pneumoperitoneum after work up.  CT imaging with IV contrast confirmed pneumoperitoneum and ascites, no evidence of perforated viscus but that assessment could not be ruled out.      PLAN:      _Fascial dehiscence may be present since patient's midline incision is producing fluid   - Pain control MM   - Monitor ostomy o/p   - TF held, NPO for work up   - Monitor NGT  - Monitor lytes &  replete PRN   - Monitor lower extremity edema  - FU labs, coags, T&S  - Call family with new findings              No acute overnight events. Patient afebrile, and hemodynamically competetn. NPO diet. Denies n/v/f/c/cp/sob.     Diet, NPO with Tube Feed:   Tube Feeding Modality: Nasogastric  Pivot 1.5 [place] (PIVOT1.5RTH)  Total Volume for 24 Hours (mL): 240  Continuous  Starting Tube Feed Rate mL per Hour: 10  Until Goal Tube Feed Rate (mL per Hour): 10  Tube Feed Duration (in Hours): 24  Tube Feed Start Time: 13:30 (12-08-21 @ 13:27)      Scheduled Medications:   cholecalciferol 2000 Unit(s) Oral daily  dextrose 40% Gel 15 Gram(s) Oral once  dextrose 5% + lactated ringers. 1000 milliLiter(s) (100 mL/Hr) IV Continuous <Continuous>  dextrose 5%. 1000 milliLiter(s) (50 mL/Hr) IV Continuous <Continuous>  dextrose 5%. 1000 milliLiter(s) (100 mL/Hr) IV Continuous <Continuous>  dextrose 50% Injectable 25 Gram(s) IV Push once  dextrose 50% Injectable 12.5 Gram(s) IV Push once  dextrose 50% Injectable 25 Gram(s) IV Push once  enoxaparin Injectable 40 milliGRAM(s) SubCutaneous daily  escitalopram 5 milliGRAM(s) Oral daily  fluticasone propionate 50 MICROgram(s)/spray Nasal Spray 1 Spray(s) Both Nostrils every 12 hours  glucagon  Injectable 1 milliGRAM(s) IntraMuscular once  insulin lispro (ADMELOG) corrective regimen sliding scale   SubCutaneous every 6 hours  lactobacillus acidophilus 1 Tablet(s) Oral daily  levothyroxine Injectable 12.5 MICROGram(s) IV Push <User Schedule>  loratadine 10 milliGRAM(s) Oral daily  mesalamine DR Capsule 800 milliGRAM(s) Oral every 12 hours  polyethylene glycol 3350 17 Gram(s) Oral daily  sodium phosphate IVPB 15 milliMole(s) IV Intermittent once    PRN Medications:  ALBUTerol    90 MICROgram(s) HFA Inhaler 2 Puff(s) Inhalation every 6 hours PRN Shortness of Breath and/or Wheezing  aluminum hydroxide/magnesium hydroxide/simethicone Suspension 30 milliLiter(s) Oral every 4 hours PRN Dyspepsia      Objective:   T(F): 97.7 (12-09 @ 04:38), Max: 98.6 (12-08 @ 17:09)  HR: 82 (12-09 @ 04:38) (68 - 82)  BP: 116/69 (12-09 @ 04:38) (94/60 - 116/69)  BP(mean): --  ABP: --  ABP(mean): --  RR: 18 (12-09 @ 04:38) (18 - 18)  SpO2: 94% (12-09 @ 04:38) (93% - 94%)      Physical Exam:   GEN: patient resting in bed, in no acute distress,&  NGT intact.  RESP: respirations are unlabored, no accessory muscle use, no conversational dyspnea  CVS: RRR  GI: Abdomen soft, non-tender; midline incision with sanguinous output, ostomy with fecal fluid output; no rebound tenderness / guarding; abdominal binder in place to hold dressings intact  MSK: extremities in flexion per baseline.     I&O's    12-08 @ 07:01  -  12-09 @ 07:00  --------------------------------------------------------  IN:    dextrose 5% + lactated ringers: 2300 mL    Free Water: 50 mL    Vital1.5: 110 mL  Total IN: 2460 mL    OUT:    Colostomy (mL): 100 mL    Nasogastric/Oral tube (mL): 300 mL    Voided (mL): 400 mL  Total OUT: 800 mL    Total NET: 1660 mL    LABS:                        13.3   14.17 )-----------( 463      ( 09 Dec 2021 06:07 )             41.2     12-09    139  |  103  |  8.7  ----------------------------<  122<H>  4.0   |  28.0  |  0.42<L>    Ca    8.5<L>      09 Dec 2021 06:07  Phos  2.7     12-09  Mg     2.1     12-09      PT/INR - ( 08 Dec 2021 06:28 )   PT: 12.1 sec;   INR: 1.05 ratio      PTT - ( 08 Dec 2021 06:28 )  PTT:25.7 sec  MICROBIOLOGY:       PATHOLOGY:  Surgical Pathology Report:   ACCESSION No:  [id number]      Accession:                             [id number]    Collected Date/Time:                   11/29/2021 17:00 EST  Received Date/Time:                    11/30/2021 11:34 EST    Surgical Pathology Report - Auth (Verified)    Specimen(s) Submitted  1  Sigmoid colon  2  Portion sigmoid colon    Final Diagnosis    1. Sigmoid colon, segmental colectomy ( length = 57.5 cm)  - Volulus with acute colitis and ischemic colitis changes    - Negative for chronic changes of inflammatory bowel disease,  dysplasia,  or malignancy  - Lymph node x 6 with sinus histiocytosis    2. Sigmoid colon, segmental colectomy ( length = 3.5 cm)  - Acute colitis with marked mural edema  - Negative for chronic changes of inflammatorybowel disease,  dysplasia,  or malignancy  - Lymph node x 1 , unremarkable  Verified by: Dion Murphy M.D.  (Electronic Signature)  Reported on: 12/08/21 18:31 [facility], [facility], [place], [address] [place] [id number]  _________________________________________________________________      Clinical Information  Sigmoid volvulus    Gross Description  1.  Received: Fresh labeled  "sigmoid colon" , consisting of an unoriented  dilated twisted bowel segment  Integrity:  Intact, dilated  Length:  57.5 g  Luminal Circumference:   Ranging from 12 cm to 20 cm  Wall:  0.5 cm, 0.7 cm  Serosa:  Glistening pink tan and smooth with petechia  Mucosa:  Pink-tan, and focally hemorrhagic, with few areas of tan filmy  material adherent to the mucosal surface.  Attached Fat:  The attached fat is glistening, partly covered by smooth  pink white soft membranous tissue. The fat appears focally slightly  edematous and focally hemorrhagic with prominent appearing vessels with  thicknened vascular walls. There are several tan rubbery lymphoid nodules  identified.  Additional Findings:   There is a separate piece of bowel in the  container,  measuring 7.5 x 1.5 x 1.5 cm, with a purple tan smooth focally  hemorrhagic serosa and pink-tan and unremarkable mucosa.  Submitted:  Representative sections in 7 cassettes:  1A-1C: Representaitve bowel  1D: Representative fat  1E: Representative fat and blood vessels  1F: Representative lymph nodes  1G: Representative separate piece    2.  Received: In formalin labeled  "portion of sigmoid colon" ,  consisting of  an unoriented segment of bowel, with open ends.  Dimensions:  3.5 cm in length and 8.5 cm in circumference  Additional Comments:   The mucosa is dark red tan, with an irregular tan  filmy area identified at the mucosal surface, which abuts both ends. One  end is inked black and the opposite end is inked blue. The bowel wall    ranges from 0.5-0.9 cm in thickness.  Submitted:  Representative tissue in two cassettes: 2A-2B    Ana Yap 12/01/2021 11:25 AM    Disclaimer  In addition to other data that may appear on the specimen containers, all  labels have been inspected to confirm the presence of the patient's name  and date of birth.    Histological Processing Performed at [facility], [place],  Department of Pathology, [address] [place] [address]. (11-29-21 @ 17:00)    A:   61y old female with severe developmental delay, nonverbal at baseline transferred for sigmoid volvulus s/p sigmoid decompression.   Due to likelihood of recurrence, surgical intervention recommended.   Now s/p open sigmoidectomy with ostomy creation. Patient progressing well with functioning ostomy however not tolerating PO.   Placed for nutritional support, incidentally discovered to have a pneumoperitoneum after work up.  CT imaging with IV contrast confirmed pneumoperitoneum and ascites, no evidence of perforated viscus but that assessment could not be ruled out.      PLAN:      _Fascial dehiscence may be present since patient's midline incision is producing fluid   - Pain control MM   - Monitor ostomy o/p   - TF held, NPO for work up   - Monitor NGT  - Monitor lytes &  replete PRN   - Monitor lower extremity edema  - FU labs, coags, T&S  - Call family with new findings

## 2021-12-09 NOTE — PROGRESS NOTE ADULT - ATTENDING COMMENTS
Seen and examined.  Speech and swallow eval completed with NH personnel at bedside. Passed and cleared for thickened nectars.  Tolerating trophic TF currently.  AFVSS  NAD  incision clean, minimal ascitic fluid drainage from midline, colostomy healthy and productive  WBC 14 from 8 (off abx)  A/P -     Speech and swallow to re-eval again today to ensure po tolerance and safety.  Pt needs 1:1 supervision for meals and given limited staff availability in hosp for 1:1 feeds, will cont with NGT feeds until medically stable for discharge. Mother otherwise refuses PEG placement.   WBC elevated while off abx. Will trend and discharge only if downtrending. Likely will not be ready for d/c to NH until monday.

## 2021-12-10 LAB
ANION GAP SERPL CALC-SCNC: 7 MMOL/L — SIGNIFICANT CHANGE UP (ref 5–17)
BUN SERPL-MCNC: 7 MG/DL — LOW (ref 8–20)
CALCIUM SERPL-MCNC: 8.2 MG/DL — LOW (ref 8.6–10.2)
CHLORIDE SERPL-SCNC: 103 MMOL/L — SIGNIFICANT CHANGE UP (ref 98–107)
CO2 SERPL-SCNC: 29 MMOL/L — SIGNIFICANT CHANGE UP (ref 22–29)
CREAT SERPL-MCNC: 0.39 MG/DL — LOW (ref 0.5–1.3)
GLUCOSE BLDC GLUCOMTR-MCNC: 51 MG/DL — CRITICAL LOW (ref 70–99)
GLUCOSE BLDC GLUCOMTR-MCNC: 62 MG/DL — LOW (ref 70–99)
GLUCOSE BLDC GLUCOMTR-MCNC: 72 MG/DL — SIGNIFICANT CHANGE UP (ref 70–99)
GLUCOSE BLDC GLUCOMTR-MCNC: 76 MG/DL — SIGNIFICANT CHANGE UP (ref 70–99)
GLUCOSE BLDC GLUCOMTR-MCNC: 77 MG/DL — SIGNIFICANT CHANGE UP (ref 70–99)
GLUCOSE BLDC GLUCOMTR-MCNC: 83 MG/DL — SIGNIFICANT CHANGE UP (ref 70–99)
GLUCOSE BLDC GLUCOMTR-MCNC: 94 MG/DL — SIGNIFICANT CHANGE UP (ref 70–99)
GLUCOSE SERPL-MCNC: 105 MG/DL — HIGH (ref 70–99)
HCT VFR BLD CALC: 39.5 % — SIGNIFICANT CHANGE UP (ref 34.5–45)
HGB BLD-MCNC: 12.6 G/DL — SIGNIFICANT CHANGE UP (ref 11.5–15.5)
MAGNESIUM SERPL-MCNC: 1.9 MG/DL — SIGNIFICANT CHANGE UP (ref 1.6–2.6)
MCHC RBC-ENTMCNC: 29.2 PG — SIGNIFICANT CHANGE UP (ref 27–34)
MCHC RBC-ENTMCNC: 31.9 GM/DL — LOW (ref 32–36)
MCV RBC AUTO: 91.4 FL — SIGNIFICANT CHANGE UP (ref 80–100)
PHOSPHATE SERPL-MCNC: 2.7 MG/DL — SIGNIFICANT CHANGE UP (ref 2.4–4.7)
PLATELET # BLD AUTO: 359 K/UL — SIGNIFICANT CHANGE UP (ref 150–400)
POTASSIUM SERPL-MCNC: 4.5 MMOL/L — SIGNIFICANT CHANGE UP (ref 3.5–5.3)
POTASSIUM SERPL-SCNC: 4.5 MMOL/L — SIGNIFICANT CHANGE UP (ref 3.5–5.3)
RBC # BLD: 4.32 M/UL — SIGNIFICANT CHANGE UP (ref 3.8–5.2)
RBC # FLD: 14.5 % — SIGNIFICANT CHANGE UP (ref 10.3–14.5)
SODIUM SERPL-SCNC: 138 MMOL/L — SIGNIFICANT CHANGE UP (ref 135–145)
WBC # BLD: 16.35 K/UL — HIGH (ref 3.8–10.5)
WBC # FLD AUTO: 16.35 K/UL — HIGH (ref 3.8–10.5)

## 2021-12-10 PROCEDURE — 71250 CT THORAX DX C-: CPT | Mod: 26

## 2021-12-10 RX ORDER — DEXTROSE 50 % IN WATER 50 %
12.5 SYRINGE (ML) INTRAVENOUS ONCE
Refills: 0 | Status: COMPLETED | OUTPATIENT
Start: 2021-12-10 | End: 2021-12-10

## 2021-12-10 RX ORDER — CEFTRIAXONE 500 MG/1
1000 INJECTION, POWDER, FOR SOLUTION INTRAMUSCULAR; INTRAVENOUS EVERY 24 HOURS
Refills: 0 | Status: DISCONTINUED | OUTPATIENT
Start: 2021-12-10 | End: 2021-12-11

## 2021-12-10 RX ORDER — IPRATROPIUM/ALBUTEROL SULFATE 18-103MCG
3 AEROSOL WITH ADAPTER (GRAM) INHALATION EVERY 6 HOURS
Refills: 0 | Status: DISCONTINUED | OUTPATIENT
Start: 2021-12-10 | End: 2021-12-19

## 2021-12-10 RX ADMIN — SODIUM CHLORIDE 100 MILLILITER(S): 9 INJECTION, SOLUTION INTRAVENOUS at 17:59

## 2021-12-10 RX ADMIN — Medication 2000 UNIT(S): at 12:18

## 2021-12-10 RX ADMIN — Medication 12.5 GRAM(S): at 13:02

## 2021-12-10 RX ADMIN — LORATADINE 10 MILLIGRAM(S): 10 TABLET ORAL at 12:18

## 2021-12-10 RX ADMIN — CEFTRIAXONE 100 MILLIGRAM(S): 500 INJECTION, POWDER, FOR SOLUTION INTRAMUSCULAR; INTRAVENOUS at 17:59

## 2021-12-10 RX ADMIN — Medication 1 SPRAY(S): at 05:23

## 2021-12-10 RX ADMIN — Medication 1 TABLET(S): at 12:18

## 2021-12-10 RX ADMIN — Medication 800 MILLIGRAM(S): at 05:23

## 2021-12-10 RX ADMIN — Medication 85 MILLIMOLE(S): at 12:18

## 2021-12-10 RX ADMIN — ENOXAPARIN SODIUM 40 MILLIGRAM(S): 100 INJECTION SUBCUTANEOUS at 12:18

## 2021-12-10 RX ADMIN — SODIUM CHLORIDE 100 MILLILITER(S): 9 INJECTION, SOLUTION INTRAVENOUS at 22:45

## 2021-12-10 RX ADMIN — ESCITALOPRAM OXALATE 5 MILLIGRAM(S): 10 TABLET, FILM COATED ORAL at 12:19

## 2021-12-10 RX ADMIN — Medication 12.5 MICROGRAM(S): at 05:22

## 2021-12-10 NOTE — PROGRESS NOTE ADULT - SUBJECTIVE AND OBJECTIVE BOX
Colorectal Progress Note    No acute overnight events. Patient afebrile, and hemodynamically stable, tolerating pureed diet with tube feeds to supplement.      Scheduled Medications:   cholecalciferol 2000 Unit(s) Oral daily  dextrose 40% Gel 15 Gram(s) Oral once  dextrose 5% + lactated ringers. 1000 milliLiter(s) (100 mL/Hr) IV Continuous <Continuous>  dextrose 5%. 1000 milliLiter(s) (50 mL/Hr) IV Continuous <Continuous>  dextrose 5%. 1000 milliLiter(s) (100 mL/Hr) IV Continuous <Continuous>  dextrose 50% Injectable 25 Gram(s) IV Push once  dextrose 50% Injectable 12.5 Gram(s) IV Push once  dextrose 50% Injectable 25 Gram(s) IV Push once  enoxaparin Injectable 40 milliGRAM(s) SubCutaneous daily  escitalopram 5 milliGRAM(s) Oral daily  fluticasone propionate 50 MICROgram(s)/spray Nasal Spray 1 Spray(s) Both Nostrils every 12 hours  glucagon  Injectable 1 milliGRAM(s) IntraMuscular once  insulin lispro (ADMELOG) corrective regimen sliding scale   SubCutaneous every 6 hours  lactobacillus acidophilus 1 Tablet(s) Oral daily  levothyroxine Injectable 12.5 MICROGram(s) IV Push <User Schedule>  loratadine 10 milliGRAM(s) Oral daily  mesalamine DR Capsule 800 milliGRAM(s) Oral every 12 hours  polyethylene glycol 3350 17 Gram(s) Oral daily    Vital Signs Last 24 Hrs  T(C): 36.7 (09 Dec 2021 21:51), Max: 37.1 (09 Dec 2021 17:17)  T(F): 98.1 (09 Dec 2021 21:51), Max: 98.7 (09 Dec 2021 17:17)  HR: 81 (09 Dec 2021 21:51) (81 - 93)  BP: 146/81 (09 Dec 2021 21:51) (91/62 - 154/81)  BP(mean): --  ABP: --  ABP(mean): --  RR: 18 (09 Dec 2021 21:51) (18 - 19)  SpO2: 91% (09 Dec 2021 21:51) (91% - 95%)  sodium phosphate IVPB 15 milliMole(s) IV Intermittent once    PRN Medications:  ALBUTerol    90 MICROgram(s) HFA Inhaler 2 Puff(s) Inhalation every 6 hours PRN Shortness of Breath and/or Wheezing  aluminum hydroxide/magnesium hydroxide/simethicone Suspension 30 milliLiter(s) Oral every 4 hours PRN Dyspepsia    Physical Exam:   GEN: patient resting in bed, in no acute distress,&  NGT intact.  RESP: respirations are unlabored, no accessory muscle use, no conversational dyspnea  CVS: RRR  GI: Abdomen soft, non-tender; midline incision with sanguinous output, ostomy with fecal fluid output; no rebound tenderness / guarding; abdominal binder in place to hold dressings intact  MSK: extremities in flexion per baseline.     I&O's Detail    08 Dec 2021 07:01  -  09 Dec 2021 07:00  --------------------------------------------------------  IN:    dextrose 5% + lactated ringers: 2300 mL    Free Water: 50 mL    Vital1.5: 110 mL  Total IN: 2460 mL    OUT:    Colostomy (mL): 100 mL    Nasogastric/Oral tube (mL): 300 mL    Voided (mL): 400 mL  Total OUT: 800 mL    Total NET: 1660 mL      09 Dec 2021 07:01  -  10 Dec 2021 03:29  --------------------------------------------------------  IN:    dextrose 5% + lactated ringers: 1900 mL  Total IN: 1900 mL    OUT:  Total OUT: 0 mL    Total NET: 1900 mL      .  LABS:                         13.3   14.17 )-----------( 463      ( 09 Dec 2021 06:07 )             41.2     12-09    139  |  103  |  8.7  ----------------------------<  122<H>  4.0   |  28.0  |  0.42<L>    Ca    8.5<L>      09 Dec 2021 06:07  Phos  2.7     12-09  Mg     2.1     12-09      PT/INR - ( 08 Dec 2021 06:28 )   PT: 12.1 sec;   INR: 1.05 ratio         PTT - ( 08 Dec 2021 06:28 )  PTT:25.7 sec          RADIOLOGY, EKG & ADDITIONAL TESTS: Reviewed.

## 2021-12-10 NOTE — PROGRESS NOTE ADULT - ASSESSMENT
A:   61y old female with severe developmental delay, nonverbal at baseline transferred for sigmoid volvulus s/p sigmoid decompression.   Due to likelihood of recurrence, surgical intervention was recommended.   Now s/p open sigmoidectomy with ostomy creation. Patient progressing well with functioning ostomy. NG tube   Placed for nutritional support, incidentally discovered to have a pneumoperitoneum after work up.  CT imaging with IV contrast confirmed pneumoperitoneum and ascites, no evidence of perforated viscus but that assessment could not be ruled out.  She was seen by speech and swallow that recommended pureed diet, moderately thick with supplemental tube feeds.      PLAN:    - Pain control MM   - Monitor ostomy o/p   - Monitor midline wound output   - Cont TF to goal, continue pureed diet   - Monitor lytes &  replete PRN   - Monitor lower extremity edema

## 2021-12-10 NOTE — PROGRESS NOTE ADULT - ATTENDING COMMENTS
Seen and examined.  No acute overnight events.  Wbc 16 from 14.  RA sats 91%.   CT chest with unchanged compressive atelectasis and effusions. Decreased pneumoperitoneum.  NAD  incision CDI, colostomy in place, soft, NTND  Labs reviewed  CT imaging and report reviewed  A/P -     Increased ascites secondary to protein malnutrition. Cont TF as tolerated.  Increasing WBC and decreasing RA sats. CT with unchanged atelectasis and effusions.  Will order duonebs, chest PT and empirically give abx.  Hope to discharge in stable condition by Monday.

## 2021-12-11 LAB
ANION GAP SERPL CALC-SCNC: 9 MMOL/L — SIGNIFICANT CHANGE UP (ref 5–17)
ANISOCYTOSIS BLD QL: SLIGHT — SIGNIFICANT CHANGE UP
BASOPHILS # BLD AUTO: 0 K/UL — SIGNIFICANT CHANGE UP (ref 0–0.2)
BASOPHILS NFR BLD AUTO: 0 % — SIGNIFICANT CHANGE UP (ref 0–2)
BUN SERPL-MCNC: 14.7 MG/DL — SIGNIFICANT CHANGE UP (ref 8–20)
CALCIUM SERPL-MCNC: 8 MG/DL — LOW (ref 8.6–10.2)
CHLORIDE SERPL-SCNC: 100 MMOL/L — SIGNIFICANT CHANGE UP (ref 98–107)
CO2 SERPL-SCNC: 25 MMOL/L — SIGNIFICANT CHANGE UP (ref 22–29)
CREAT SERPL-MCNC: 0.49 MG/DL — LOW (ref 0.5–1.3)
EOSINOPHIL # BLD AUTO: 0 K/UL — SIGNIFICANT CHANGE UP (ref 0–0.5)
EOSINOPHIL NFR BLD AUTO: 0 % — SIGNIFICANT CHANGE UP (ref 0–6)
GLUCOSE BLDC GLUCOMTR-MCNC: 113 MG/DL — HIGH (ref 70–99)
GLUCOSE BLDC GLUCOMTR-MCNC: 135 MG/DL — HIGH (ref 70–99)
GLUCOSE BLDC GLUCOMTR-MCNC: 144 MG/DL — HIGH (ref 70–99)
GLUCOSE BLDC GLUCOMTR-MCNC: 183 MG/DL — HIGH (ref 70–99)
GLUCOSE SERPL-MCNC: 216 MG/DL — HIGH (ref 70–99)
HCT VFR BLD CALC: 40.7 % — SIGNIFICANT CHANGE UP (ref 34.5–45)
HGB BLD-MCNC: 13.5 G/DL — SIGNIFICANT CHANGE UP (ref 11.5–15.5)
LYMPHOCYTES # BLD AUTO: 0.19 K/UL — LOW (ref 1–3.3)
LYMPHOCYTES # BLD AUTO: 0.9 % — LOW (ref 13–44)
MACROCYTES BLD QL: SLIGHT — SIGNIFICANT CHANGE UP
MAGNESIUM SERPL-MCNC: 1.8 MG/DL — SIGNIFICANT CHANGE UP (ref 1.6–2.6)
MANUAL SMEAR VERIFICATION: SIGNIFICANT CHANGE UP
MCHC RBC-ENTMCNC: 28.5 PG — SIGNIFICANT CHANGE UP (ref 27–34)
MCHC RBC-ENTMCNC: 33.2 GM/DL — SIGNIFICANT CHANGE UP (ref 32–36)
MCV RBC AUTO: 86 FL — SIGNIFICANT CHANGE UP (ref 80–100)
MONOCYTES # BLD AUTO: 0.56 K/UL — SIGNIFICANT CHANGE UP (ref 0–0.9)
MONOCYTES NFR BLD AUTO: 2.6 % — SIGNIFICANT CHANGE UP (ref 2–14)
NEUTROPHILS # BLD AUTO: 20.83 K/UL — HIGH (ref 1.8–7.4)
NEUTROPHILS NFR BLD AUTO: 95.6 % — HIGH (ref 43–77)
NEUTS BAND # BLD: 0.9 % — SIGNIFICANT CHANGE UP (ref 0–8)
OVALOCYTES BLD QL SMEAR: SLIGHT — SIGNIFICANT CHANGE UP
PHOSPHATE SERPL-MCNC: 3.8 MG/DL — SIGNIFICANT CHANGE UP (ref 2.4–4.7)
PLAT MORPH BLD: NORMAL — SIGNIFICANT CHANGE UP
PLATELET # BLD AUTO: 513 K/UL — HIGH (ref 150–400)
POIKILOCYTOSIS BLD QL AUTO: SLIGHT — SIGNIFICANT CHANGE UP
POTASSIUM SERPL-MCNC: 4.3 MMOL/L — SIGNIFICANT CHANGE UP (ref 3.5–5.3)
POTASSIUM SERPL-SCNC: 4.3 MMOL/L — SIGNIFICANT CHANGE UP (ref 3.5–5.3)
RBC # BLD: 4.73 M/UL — SIGNIFICANT CHANGE UP (ref 3.8–5.2)
RBC # FLD: 14.3 % — SIGNIFICANT CHANGE UP (ref 10.3–14.5)
RBC BLD AUTO: ABNORMAL
SARS-COV-2 RNA SPEC QL NAA+PROBE: SIGNIFICANT CHANGE UP
SODIUM SERPL-SCNC: 134 MMOL/L — LOW (ref 135–145)
WBC # BLD: 21.59 K/UL — HIGH (ref 3.8–10.5)
WBC # FLD AUTO: 21.59 K/UL — HIGH (ref 3.8–10.5)

## 2021-12-11 PROCEDURE — 74177 CT ABD & PELVIS W/CONTRAST: CPT | Mod: 26

## 2021-12-11 PROCEDURE — 71260 CT THORAX DX C+: CPT | Mod: 26

## 2021-12-11 PROCEDURE — 99233 SBSQ HOSP IP/OBS HIGH 50: CPT

## 2021-12-11 RX ORDER — SODIUM CHLORIDE 9 MG/ML
1000 INJECTION, SOLUTION INTRAVENOUS ONCE
Refills: 0 | Status: COMPLETED | OUTPATIENT
Start: 2021-12-11 | End: 2021-12-11

## 2021-12-11 RX ORDER — PIPERACILLIN AND TAZOBACTAM 4; .5 G/20ML; G/20ML
3.38 INJECTION, POWDER, LYOPHILIZED, FOR SOLUTION INTRAVENOUS EVERY 8 HOURS
Refills: 0 | Status: DISCONTINUED | OUTPATIENT
Start: 2021-12-11 | End: 2021-12-17

## 2021-12-11 RX ORDER — ACETAMINOPHEN 500 MG
1000 TABLET ORAL ONCE
Refills: 0 | Status: COMPLETED | OUTPATIENT
Start: 2021-12-11 | End: 2021-12-11

## 2021-12-11 RX ORDER — SODIUM CHLORIDE 9 MG/ML
1000 INJECTION, SOLUTION INTRAVENOUS
Refills: 0 | Status: DISCONTINUED | OUTPATIENT
Start: 2021-12-11 | End: 2021-12-20

## 2021-12-11 RX ORDER — PIPERACILLIN AND TAZOBACTAM 4; .5 G/20ML; G/20ML
3.38 INJECTION, POWDER, LYOPHILIZED, FOR SOLUTION INTRAVENOUS ONCE
Refills: 0 | Status: COMPLETED | OUTPATIENT
Start: 2021-12-11 | End: 2021-12-11

## 2021-12-11 RX ADMIN — Medication 800 MILLIGRAM(S): at 17:22

## 2021-12-11 RX ADMIN — Medication 2000 UNIT(S): at 12:52

## 2021-12-11 RX ADMIN — Medication 1 TABLET(S): at 12:54

## 2021-12-11 RX ADMIN — ENOXAPARIN SODIUM 40 MILLIGRAM(S): 100 INJECTION SUBCUTANEOUS at 12:54

## 2021-12-11 RX ADMIN — Medication 3 MILLILITER(S): at 15:45

## 2021-12-11 RX ADMIN — Medication 1 SPRAY(S): at 17:24

## 2021-12-11 RX ADMIN — PIPERACILLIN AND TAZOBACTAM 25 GRAM(S): 4; .5 INJECTION, POWDER, LYOPHILIZED, FOR SOLUTION INTRAVENOUS at 12:57

## 2021-12-11 RX ADMIN — SODIUM CHLORIDE 1000 MILLILITER(S): 9 INJECTION, SOLUTION INTRAVENOUS at 00:49

## 2021-12-11 RX ADMIN — Medication 12.5 MICROGRAM(S): at 06:29

## 2021-12-11 RX ADMIN — Medication 1000 MILLIGRAM(S): at 22:40

## 2021-12-11 RX ADMIN — PIPERACILLIN AND TAZOBACTAM 25 GRAM(S): 4; .5 INJECTION, POWDER, LYOPHILIZED, FOR SOLUTION INTRAVENOUS at 22:25

## 2021-12-11 RX ADMIN — LORATADINE 10 MILLIGRAM(S): 10 TABLET ORAL at 12:54

## 2021-12-11 RX ADMIN — SODIUM CHLORIDE 100 MILLILITER(S): 9 INJECTION, SOLUTION INTRAVENOUS at 22:25

## 2021-12-11 RX ADMIN — POLYETHYLENE GLYCOL 3350 17 GRAM(S): 17 POWDER, FOR SOLUTION ORAL at 12:56

## 2021-12-11 RX ADMIN — Medication 400 MILLIGRAM(S): at 22:25

## 2021-12-11 RX ADMIN — Medication 3 MILLILITER(S): at 21:54

## 2021-12-11 RX ADMIN — ESCITALOPRAM OXALATE 5 MILLIGRAM(S): 10 TABLET, FILM COATED ORAL at 12:53

## 2021-12-11 RX ADMIN — Medication 1 SPRAY(S): at 06:31

## 2021-12-11 NOTE — PROGRESS NOTE ADULT - ASSESSMENT
A:   61y old female with severe developmental delay, nonverbal at baseline transferred for sigmoid volvulus s/p sigmoid decompression.   Due to likelihood of recurrence, surgical intervention was recommended.   Now s/p open sigmoidectomy with ostomy creation. Patient progressing well with functioning ostomy. NG tube   Placed for nutritional support, incidentally discovered to have a pneumoperitoneum after work up.  CT imaging with IV contrast confirmed pneumoperitoneum and ascites, no evidence of perforated viscus but that assessment could not be ruled out.  She was seen by speech and swallow that recommended pureed diet, moderately thick with supplemental tube feeds.      PLAN:    - Pain control MM   - Monitor ostomy o/p   - Monitor midline wound output   - Cont TF to goal, continue pureed diet   - Monitor lytes &  replete PRN   - Monitor lower extremity edema and upper extremity temperatures/cyanosis  - Monitor vitals

## 2021-12-11 NOTE — PROGRESS NOTE ADULT - ASSESSMENT
61 year old female with h/o severe developmental delay (MR and nonverbal at baseline), osteoporosis, hypothyroidism transferred to [facility] with sigmoid volvulus now s/p sigmoid resection with ostomy creation with new leucocytosis    Leucocytosis with episode of hypotension which improved with IVF raises the concern for a systemic bacterial infection. Her imaging post op did show intraabdominal air as well as new ascites, both of which although could be reactive should be ruled out (again). She does not have signs of an acute pulm process at this time. She recieved 1 dose of IV Ceftriaxone yesterday (12/10) and 1 dose of PipTazo today prior to single blood culture being drawn. \   Since the pt is non verbal and is unable to help direct/ localize any pain which may have asisteed in narrowing possibilities would     (1) Repeat Blood cultures, send 2 sets from contralateral peripheral sites   (2) Send a lactic acid level now and use as a surrogate for tyargeting fluid resucitation (as recommended by surg sepsis guideleines)  (3) Repeat imaging wi6th IV and oral contrast (CTA chest, CT abd/Pel with IV and oral contrast)  (4) Continue PipTazo 3.375 mg Q 6 hours for now   (5) Send a UA and Urine culture        CT 12/10 unremarkable  Abd is firm, however pt does not wince on palpation however given pneumopeirtoneam as well as ascites (which may be recative) would recommen      [place] [facility] [place] now s/p open sigmoidectomy with ostomy creation. Post op noted to have a pneumoperitoneum. Course further complicated by rising leucocytosis, hypotension (responsive to IV fluids), and now low grade t    61 year old female with h/o severe developmental delay (MR and nonverbal at baseline), osteoporosis, hypothyroidism transferred to [facility] with sigmoid volvulus now s/p sigmoid resection with ostomy creation with new leucocytosis    Leucocytosis with episode of hypotension which improved with IVF raises the concern for a systemic bacterial infection. Her imaging post op did show intraabdominal air as well as new ascites, both of which although could be reactive should be ruled out (again). She does not have signs of an acute pulm process at this time. She recieved 1 dose of IV Ceftriaxone yesterday (12/10) and 1 dose of PipTazo today prior to single blood culture being drawn.   Since the pt is non verbal and is unable to help direct/ localize any pain which may have asisteed in narrowing possibilities would     (1) Repeat Blood cultures, send 2 sets from contralateral peripheral sites   (2) Send a lactic acid level now and use as a surrogate for targeting fluid resucitation (as recommended by surg sepsis guideleines)  (3) Repeat imaging wi6th IV and oral contrast (CTA chest, CT abd/Pel with IV and oral contrast). Would hold OG feeding until CT abd is reviewed and is determined neg fr acute pathology  (4) Ascitic fluid aspiration with analysis and culture IF present on repeat imaging   (5) Continue PipTazo 3.375 gm Q 6 hours for now   (6) Send a UA and Urine culture  NOW  (7) Daily abd exams and repeat blood cultures daily IF blood cultures yield growth (till neg)     In the setting of Abx administration (ceftriaxone and then PipTazo) prior to micro data being drawn would not be surprised IF cultures do not yield growth. In such a case would opt for treating most likely etiology of suspected bacterial infection imaging findings     Thank you for allowing us to participate in the care of this pt. Medicine will continue follow along with you at this time     61 year old female with h/o severe developmental delay (MR and nonverbal at baseline), osteoporosis, hypothyroidism transferred to [facility] with sigmoid volvulus now s/p sigmoid resection with ostomy creation with new leucocytosis    Leucocytosis with episode of hypotension which improved with IVF raises the concern for a systemic bacterial infection. Her imaging post op did show intraabdominal air as well as new ascites, both of which although could be reactive should be ruled out (again). She does not have signs of an acute pulm process at this time. She recieved 1 dose of IV Ceftriaxone yesterday (12/10) and 1 dose of PipTazo today prior to single blood culture being drawn.   Since the pt is non verbal and is unable to help direct/ localize any pain which may have asisteed in narrowing possibilities would     (1) Repeat Blood cultures, send 2 sets from contralateral peripheral sites   (2) Send a lactic acid level now and use as a surrogate for targeting fluid resucitation   (3) Repeat imaging with IV and oral contrast (CTA chest, CT abd/Pel with IV and oral contrast). Would hold OG feeding until CT abd is reviewed and is determined neg fr acute pathology  (4) Ascitic fluid aspiration with analysis and culture IF present on repeat imaging   (5) Continue PipTazo 3.375 gm Q 6 hours for now   (6) Send a UA and Urine culture  NOW  (7) Daily abd exams and repeat blood cultures daily IF blood cultures yield growth (till neg)     In the setting of Abx administration (ceftriaxone and then PipTazo) prior to micro data being drawn would not be surprised IF cultures do not yield growth. In such a case would opt for treating most likely etiology of suspected bacterial infection imaging findings     Thank you for allowing us to participate in the care of this pt. Medicine will continue follow along with you at this time

## 2021-12-11 NOTE — PROGRESS NOTE ADULT - SUBJECTIVE AND OBJECTIVE BOX
[facility] Division of Hospital Medicine    Medicine reconsult note     Briefly pt is a 61 year old female with h/o severe developmental delay (MR and nonverbal at baseline), osteoporosis, hypothyroidism [place] [facility] [place] now s/p open sigmoidectomy with ostomy creation. Post op noted to have a pneumoperitoneum. Course further complicated by rising leucocytosis, hypotension (responsive to IV fluids), and now low grade temp.   On exam pt unable to provide any detailed information. Does not appear to be in acute pain or discomfort.   Medicine called for assistance with sepsis w/u    MEDICATIONS  (STANDING):  albuterol/ipratropium for Nebulization 3 milliLiter(s) Nebulizer every 6 hours  cholecalciferol 2000 Unit(s) Oral daily  dextrose 40% Gel 15 Gram(s) Oral once  dextrose 5% + lactated ringers. 1000 milliLiter(s) (100 mL/Hr) IV Continuous <Continuous>  dextrose 5%. 1000 milliLiter(s) (50 mL/Hr) IV Continuous <Continuous>  dextrose 5%. 1000 milliLiter(s) (100 mL/Hr) IV Continuous <Continuous>  dextrose 50% Injectable 25 Gram(s) IV Push once  dextrose 50% Injectable 12.5 Gram(s) IV Push once  dextrose 50% Injectable 25 Gram(s) IV Push once  enoxaparin Injectable 40 milliGRAM(s) SubCutaneous daily  escitalopram 5 milliGRAM(s) Oral daily  fluticasone propionate 50 MICROgram(s)/spray Nasal Spray 1 Spray(s) Both Nostrils every 12 hours  glucagon  Injectable 1 milliGRAM(s) IntraMuscular once  insulin lispro (ADMELOG) corrective regimen sliding scale   SubCutaneous every 6 hours  lactobacillus acidophilus 1 Tablet(s) Oral daily  levothyroxine Injectable 12.5 MICROGram(s) IV Push <User Schedule>  loratadine 10 milliGRAM(s) Oral daily  mesalamine DR Capsule 800 milliGRAM(s) Oral every 12 hours  piperacillin/tazobactam IVPB.. 3.375 Gram(s) IV Intermittent every 8 hours  polyethylene glycol 3350 17 Gram(s) Oral daily    MEDICATIONS  (PRN):  ALBUTerol    90 MICROgram(s) HFA Inhaler 2 Puff(s) Inhalation every 6 hours PRN Shortness of Breath and/or Wheezing  aluminum hydroxide/magnesium hydroxide/simethicone Suspension 30 milliLiter(s) Oral every 4 hours PRN Dyspepsia        I&O's Summary    10 Dec 2021 07:01  -  11 Dec 2021 07:00  --------------------------------------------------------  IN: 2815 mL / OUT: 50 mL / NET: 2765 mL        PHYSICAL EXAM:  Vital Signs Last 24 Hrs  T(C): 37.2 (11 Dec 2021 11:11), Max: 37.7 (11 Dec 2021 00:15)  T(F): 98.9 (11 Dec 2021 11:11), Max: 99.8 (11 Dec 2021 00:15)  HR: 120 (11 Dec 2021 11:11) (82 - 120)  BP: 104/69 (11 Dec 2021 11:11) (86/54 - 118/77)  BP(mean): --  RR: 18 (11 Dec 2021 11:11) (17 - 18)  SpO2: 99% (11 Dec 2021 11:11) (91% - 99%)        CONSTITUTIONAL: Cachectic, non verbal female lying in bed, leaning to right   ENMT: Dry oral mucosa, OG tube in place, no cervical lymphadenopathy   RESPIRATORY: Normal respiratory effort; lungs are clear to auscultation bilaterally  CARDIOVASCULAR: Mild tachycardia,  Regular rate and rhythm, normal S1 and S2, no murmur/rub/gallop; No lower extremity edema  ABDOMEN: Firm, Nontender to palpation, stoma with brown liquid stool, mid abdominal incision healing well, no purulence or surrounding erythema   MUSCLOSKELETAL: No appreciable digital cyanosis, intermittent splaying of RUE   PSYCH: Awake, unable to respond to questions   NEUROLOGY: No focal motor deficits,   SKIN: No rashes; no palpable lesions    LABS:                        13.5   21.59 )-----------( 513      ( 11 Dec 2021 06:31 )             40.7     12-11    134<L>  |  100  |  14.7  ----------------------------<  216<H>  4.3   |  25.0  |  0.49<L>    Ca    8.0<L>      11 Dec 2021 06:31  Phos  3.8     12-11  Mg     1.8     12-11                CAPILLARY BLOOD GLUCOSE      POCT Blood Glucose.: 144 mg/dL (11 Dec 2021 12:51)  POCT Blood Glucose.: 183 mg/dL (11 Dec 2021 06:27)  POCT Blood Glucose.: 94 mg/dL (10 Dec 2021 22:43)  POCT Blood Glucose.: 77 mg/dL (10 Dec 2021 18:01)        RADIOLOGY & ADDITIONAL TESTS:    12/10/21 CT Chest  IMPRESSION:  Compressive atelectasis within basilar left lower lobe is unchanged from   11/27/2021. Otherwise, no new opacity.  Small pleural effusions.  In comparison with 12/7/2021, decreased pneumoperitoneum. Increased   ascites.    12/6/21 CT Abd/Pel w/ IV con  IMPRESSION:  Gross ascites new since 11/27/2021.  Large pneumoperitoneum.  Mural thickening rectal stump and small bowel.    12/7/21 CT Abd/Pel w/oral con  IMPRESSION:  Large amount of ascites and intra-abdominal free air; ascites slightly increased since 12/26/2021; free air is greater than expected for a patient who is status post surgery 11/29/2021; the source of the free air is uncertain.  No extravasation of oral contrast from the small bowel or opacified cecum.  Diffuse wall thickening involving the colon, wall thickening involving the rectal stump, and areas of small bowel thickening.  Cluster of droplets of air in the left lower quadrant which may be free air, however mesenteric venous air cannot be excluded; clinical correlation is recommended for ischemia including with a lactate level.  Bilateral striated nephrograms which may reflect infection or renal failure; clinical correlation is recommended.  Bladder wall thickening; correlation is recommended for cystitis.

## 2021-12-11 NOTE — CHART NOTE - NSCHARTNOTEFT_GEN_A_CORE
Source: Patient [ ]  Family [ ]   other [x ]    Current Diet: Diet, Pureed:   Moderately Thick Liquids (MODTHICKLIQS)  Tube Feeding Modality: Nasogastric  Pivot 1.5 [place] (PIVOT1.5RTH)  Total Volume for 24 Hours (mL): 720  Continuous  Starting Tube Feed Rate {mL per Hour}: 10  Until Goal Tube Feed Rate (mL per Hour): 30  Tube Feed Duration (in Hours): 24  Tube Feed Start Time: 13:30  Free Water Flush   Total Volume per Flush (mL): 150   Frequency: Every 4 Hours   Total Daily Volume of Flush (mL): 900    Start Time: 13:30 (12-09-21 @ 12:59)    Enteral /Parenteral Nutrition: Tube feeds at goal rate of 30 ml/hr (x24 hrs) will provide 600 ml, 900 kcal, 56g protein, 455 ml free water; receiving additional free water of 150 ml q4 hrs.     Current Weight:   (12/8) 109.1 lbs  (11/30) 106.2 lbs  ? accuracy of weights, noted with 2+ b/l foot and b/l ankle edema, continue to trend and maintain strict Is&Os     Pertinent Medications: MEDICATIONS  (STANDING):  albuterol/ipratropium for Nebulization 3 milliLiter(s) Nebulizer every 6 hours  cefTRIAXone   IVPB 1000 milliGRAM(s) IV Intermittent every 24 hours  cholecalciferol 2000 Unit(s) Oral daily  dextrose 40% Gel 15 Gram(s) Oral once  dextrose 5% + lactated ringers. 1000 milliLiter(s) (100 mL/Hr) IV Continuous <Continuous>  dextrose 5%. 1000 milliLiter(s) (50 mL/Hr) IV Continuous <Continuous>  dextrose 5%. 1000 milliLiter(s) (100 mL/Hr) IV Continuous <Continuous>  dextrose 50% Injectable 25 Gram(s) IV Push once  dextrose 50% Injectable 12.5 Gram(s) IV Push once  dextrose 50% Injectable 25 Gram(s) IV Push once  enoxaparin Injectable 40 milliGRAM(s) SubCutaneous daily  escitalopram 5 milliGRAM(s) Oral daily  fluticasone propionate 50 MICROgram(s)/spray Nasal Spray 1 Spray(s) Both Nostrils every 12 hours  glucagon  Injectable 1 milliGRAM(s) IntraMuscular once  insulin lispro (ADMELOG) corrective regimen sliding scale   SubCutaneous every 6 hours  lactobacillus acidophilus 1 Tablet(s) Oral daily  levothyroxine Injectable 12.5 MICROGram(s) IV Push <User Schedule>  loratadine 10 milliGRAM(s) Oral daily  mesalamine DR Capsule 800 milliGRAM(s) Oral every 12 hours  polyethylene glycol 3350 17 Gram(s) Oral daily    MEDICATIONS  (PRN):  ALBUTerol    90 MICROgram(s) HFA Inhaler 2 Puff(s) Inhalation every 6 hours PRN Shortness of Breath and/or Wheezing  aluminum hydroxide/magnesium hydroxide/simethicone Suspension 30 milliLiter(s) Oral every 4 hours PRN Dyspepsia    Pertinent Labs: CBC Full  -  ( 11 Dec 2021 06:31 )  WBC Count : 21.59 K/uL  RBC Count : 4.73 M/uL  Hemoglobin : 13.5 g/dL  Hematocrit : 40.7 %  Platelet Count - Automated : 513 K/uL  Mean Cell Volume : 86.0 fl  Mean Cell Hemoglobin : 28.5 pg  Mean Cell Hemoglobin Concentration : 33.2 gm/dL  Auto Neutrophil # : 20.83 K/uL  Auto Lymphocyte # : 0.19 K/uL  Auto Monocyte # : 0.56 K/uL  Auto Eosinophil # : 0.00 K/uL  Auto Basophil # : 0.00 K/uL  Auto Neutrophil % : 95.6 %  Auto Lymphocyte % : 0.9 %  Auto Monocyte % : 2.6 %  Auto Eosinophil % : 0.0 %  Auto Basophil % : 0.0 %    12-11 Na134 mmol/L<L> Glu 216 mg/dL<H> K+ 4.3 mmol/L Cr  0.49 mg/dL<L> BUN 14.7 mg/dL Phos 3.8 mg/dL Alb n/a   PAB n/a       Skin: Surgical incision to midline abdomen and IAD per documentation    Nutrition focused physical exam previously conducted - found signs of malnutrition [ ]absent [ x]present    Subcutaneous fat loss: [ x] Orbital fat pads region, [ x]Buccal fat region, [ x]Triceps region,  [ ]Ribs region    Muscle wasting: [ x]Temples region, [x ]Clavicle region, [ ]Shoulder region, [ ]Scapula region, [ ]Interosseous region,  [ ]thigh region, [ ]Calf region    Estimated Needs:   [x ] no change since previous assessment  [ ] recalculated:     Current Nutrition Diagnosis: Pt remains at high nutrition risk secondary to malnutrition (severe, acute on chronic) related to inability to meet sufficient protein-energy in setting of MR, dysphagia, poor skin integrity, 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 Pt now on a pureed diet with moderately thick liquids (pts baseline per documentation). Also continuing to receive enteral nutrition via NGT. Calorie count ordered (12/11-12/13); sheets hung in pts room, RN aware. RD to follow up with results.    Recommendations:   1) Continue tube feeds as ordered at this time; additional free water per MD discretion.  2) Continue pureed diet with moderately thick liquids as tolerated.  3) Rx: MVI and vitamin C 500mg daily.   4) Encourage po intake, monitor diet tolerance, and provide assistance at meals as needed.  5) Obtain daily weights to monitor trends.     Monitoring and Evaluation:   [ x] PO intake [ x] Tolerance to diet prescription [X] Weights  [X] Follow up per protocol [X] Labs

## 2021-12-11 NOTE — PROGRESS NOTE ADULT - SUBJECTIVE AND OBJECTIVE BOX
Colorectal Surgery Progress Note:    Patient nonverbal without ability to share complaints.  Overnight, patient with low blood pressuure reading of 86/54 and bolus was initiated.  Bilateral hands with colder temperature then body and thus warming pads were placed underneath (O2 unable to be attained by edematous lower extremity digits).      No acute overnight events. Patient afebrile. Tolerating diet via NGT. Denies n/v/f/c/cp/sob.     Diet, Pureed:   Moderately Thick Liquids (MODTHICKLIQS)  Tube Feeding Modality: Nasogastric  Pivot 1.5 [place] (PIVOT1.5RTH)  Total Volume for 24 Hours (mL): 720  Continuous  Starting Tube Feed Rate mL per Hour: 10  Until Goal Tube Feed Rate (mL per Hour): 30  Tube Feed Duration (in Hours): 24  Tube Feed Start Time: 13:30  Free Water Flush   Total Volume per Flush (mL): 150   Frequency: Every 4 Hours   Total Daily Volume of Flush (mL): 900    Start Time: 13:30 (12-09-21 @ 12:59)      Scheduled Medications:   albuterol/ipratropium for Nebulization 3 milliLiter(s) Nebulizer every 6 hours  cefTRIAXone   IVPB 1000 milliGRAM(s) IV Intermittent every 24 hours  cholecalciferol 2000 Unit(s) Oral daily  dextrose 40% Gel 15 Gram(s) Oral once  dextrose 5% + lactated ringers. 1000 milliLiter(s) (100 mL/Hr) IV Continuous <Continuous>  dextrose 5%. 1000 milliLiter(s) (50 mL/Hr) IV Continuous <Continuous>  dextrose 5%. 1000 milliLiter(s) (100 mL/Hr) IV Continuous <Continuous>  dextrose 50% Injectable 25 Gram(s) IV Push once  dextrose 50% Injectable 12.5 Gram(s) IV Push once  dextrose 50% Injectable 25 Gram(s) IV Push once  enoxaparin Injectable 40 milliGRAM(s) SubCutaneous daily  escitalopram 5 milliGRAM(s) Oral daily  fluticasone propionate 50 MICROgram(s)/spray Nasal Spray 1 Spray(s) Both Nostrils every 12 hours  glucagon  Injectable 1 milliGRAM(s) IntraMuscular once  insulin lispro (ADMELOG) corrective regimen sliding scale   SubCutaneous every 6 hours  lactobacillus acidophilus 1 Tablet(s) Oral daily  levothyroxine Injectable 12.5 MICROGram(s) IV Push <User Schedule>  loratadine 10 milliGRAM(s) Oral daily  mesalamine DR Capsule 800 milliGRAM(s) Oral every 12 hours  polyethylene glycol 3350 17 Gram(s) Oral daily    PRN Medications:  ALBUTerol    90 MICROgram(s) HFA Inhaler 2 Puff(s) Inhalation every 6 hours PRN Shortness of Breath and/or Wheezing  aluminum hydroxide/magnesium hydroxide/simethicone Suspension 30 milliLiter(s) Oral every 4 hours PRN Dyspepsia      Objective:   T(F): 99.8 (12-11 @ 00:15), Max: 99.8 (12-11 @ 00:15)  HR: 95 (12-11 @ 02:00) (82 - 105)  BP: 101/66 (12-11 @ 02:00) (86/54 - 118/77)  BP(mean): --  ABP: --  ABP(mean): --  RR: 18 (12-11 @ 02:00) (17 - 18)  SpO2: 94% (12-11 @ 02:00) (91% - 94%)      Physical Exam:   GEN: patient resting comfortably in bed, in no acute distress, NGT in place and connected to suction  RESP: respirations are unlabored, no accessory muscle use, no conversational dyspnea  CVS: RRR  GI: Abdomen soft, non-tender, non-distended, no rebound tenderness / guarding; ostomy stoma pink and patent, fecal fluid material in bag, midline incision site dry     I&O's    12-09 @ 07:01  -  12-10 @ 07:00  --------------------------------------------------------  IN:    dextrose 5% + lactated ringers: 2400 mL    Vital1.5: 120 mL  Total IN: 2520 mL    OUT:  Total OUT: 0 mL    Total NET: 2520 mL          LABS:                        12.6   16.35 )-----------( 359      ( 10 Dec 2021 05:11 )             39.5     12-10    138  |  103  |  7.0<L>  ----------------------------<  105<H>  4.5   |  29.0  |  0.39<L>    Ca    8.2<L>      10 Dec 2021 05:11  Phos  2.7     12-10  Mg     1.9     12-10            MICROBIOLOGY:       PATHOLOGY:  Surgical Pathology Report:   ACCESSION No:  [id number]      Accession:                             [id number]    Collected Date/Time:                   11/29/2021 17:00 EST  Received Date/Time:                    11/30/2021 11:34 EST    Surgical Pathology Report - Auth (Verified)    Specimen(s) Submitted  1  Sigmoid colon  2  Portion sigmoid colon    Final Diagnosis    1. Sigmoid colon, segmental colectomy ( length = 57.5 cm)  - Volulus with acute colitis and ischemic colitis changes    - Negative for chronic changes of inflammatory bowel disease,  dysplasia,  or malignancy  - Lymph node x 6 with sinus histiocytosis    2. Sigmoid colon, segmental colectomy ( length = 3.5 cm)  - Acute colitis with marked mural edema  - Negative for chronic changes of inflammatorybowel disease,  dysplasia,  or malignancy  - Lymph node x 1 , unremarkable  Verified by: Dion Murphy M.D.  (Electronic Signature)  Reported on: 12/08/21 18:31 EST, [facility], [address] [place] [id number]  _________________________________________________________________      Clinical Information  Sigmoid volvulus    Gross Description  1.  Received: Fresh labeled  "sigmoid colon" , consisting of an unoriented  dilated twisted bowel segment  Integrity:  Intact, dilated  Length:  57.5 g  Luminal Circumference:   Ranging from 12 cm to 20 cm  Wall:  0.5 cm, 0.7 cm  Serosa:  Glistening pink tan and smooth with petechia  Mucosa:  Pink-tan, and focally hemorrhagic, with few areas of tan filmy  material adherent to the mucosal surface.  Attached Fat:  The attached fat is glistening, partly covered by smooth  pink white soft membranous tissue. The fat appears focally slightly  edematous and focally hemorrhagic with prominent appearing vessels with  thicknened vascular walls. There are several tan rubbery lymphoid nodules  identified.  Additional Findings:   There is a separate piece of bowel in the  container,  measuring 7.5 x 1.5 x 1.5 cm, with a purple tan smooth focally  hemorrhagic serosa and pink-tan and unremarkable mucosa.  Submitted:  Representative sections in 7 cassettes:  1A-1C: Representaitve bowel  1D: Representative fat  1E: Representative fat and blood vessels  1F: Representative lymph nodes  1G: Representative separate piece    2.  Received: In formalin labeled  "portion of sigmoid colon" ,  consisting of  an unoriented segment of bowel, with open ends.  Dimensions:  3.5 cm in length and 8.5 cm in circumference  Additional Comments:   The mucosa is dark red tan, with an irregular tan  filmy area identified at the mucosal surface, which abuts both ends. One  end is inked black and the opposite end is inked blue. The bowel wall    ranges from 0.5-0.9 cm in thickness.  Submitted:  Representative tissue in two cassettes: 2A-2B    Ana Yorkgisel 12/01/2021 11:25 AM    Disclaimer  In addition to other data that may appear on the specimen containers, all  labels have been inspected to confirm the presence of the patient's name  and date of birth.    Histological Processing Performed at [facility],  Department of Pathology, [address] [place] [address]. (11-29-21 @ 17:00)

## 2021-12-11 NOTE — PROGRESS NOTE ADULT - ATTENDING COMMENTS
Seen and examined.  Overnight events noted. Low BPs to 80s and mild tachy. BPs responded to bolus.  WBC noted to be elevated now to 22.  Also requiring 02 to maintain room air sats>91%.  Non contrast CT chest yest without new changes.  AF  NAD  colostomy healthy with thin stool, soft, no obvious tenderness appreciable, ND  Labs reviewed  A/P -   Evolving sepsis though source unknown. Cont to suspect lung to be origin as WBC began increasing after initiation of TF.  Pan scan ordered for today, all to be completed with IV contrast.  Ceftriaxone discontinued and zosyn started.  On duonebs and chest PT ordered.  Blood cultures also ordered.  Reconsult medicine service for assistance in care.  Low threshold for transfer to higher acuity unit.

## 2021-12-12 LAB
ALBUMIN SERPL ELPH-MCNC: 2 G/DL — LOW (ref 3.3–5.2)
ALP SERPL-CCNC: 87 U/L — SIGNIFICANT CHANGE UP (ref 40–120)
ALT FLD-CCNC: 18 U/L — SIGNIFICANT CHANGE UP
ANION GAP SERPL CALC-SCNC: 10 MMOL/L — SIGNIFICANT CHANGE UP (ref 5–17)
APPEARANCE UR: CLEAR — SIGNIFICANT CHANGE UP
AST SERPL-CCNC: 17 U/L — SIGNIFICANT CHANGE UP
BACTERIA # UR AUTO: ABNORMAL
BASOPHILS # BLD AUTO: 0 K/UL — SIGNIFICANT CHANGE UP (ref 0–0.2)
BASOPHILS NFR BLD AUTO: 0 % — SIGNIFICANT CHANGE UP (ref 0–2)
BILIRUB DIRECT SERPL-MCNC: 0.1 MG/DL — SIGNIFICANT CHANGE UP (ref 0–0.3)
BILIRUB INDIRECT FLD-MCNC: 0.2 MG/DL — SIGNIFICANT CHANGE UP (ref 0.2–1)
BILIRUB SERPL-MCNC: 0.3 MG/DL — LOW (ref 0.4–2)
BILIRUB UR-MCNC: NEGATIVE — SIGNIFICANT CHANGE UP
BUN SERPL-MCNC: 14.4 MG/DL — SIGNIFICANT CHANGE UP (ref 8–20)
BURR CELLS BLD QL SMEAR: PRESENT — SIGNIFICANT CHANGE UP
CALCIUM SERPL-MCNC: 8.5 MG/DL — LOW (ref 8.6–10.2)
CHLORIDE SERPL-SCNC: 100 MMOL/L — SIGNIFICANT CHANGE UP (ref 98–107)
CO2 SERPL-SCNC: 27 MMOL/L — SIGNIFICANT CHANGE UP (ref 22–29)
COLOR SPEC: YELLOW — SIGNIFICANT CHANGE UP
CREAT SERPL-MCNC: 0.48 MG/DL — LOW (ref 0.5–1.3)
DIFF PNL FLD: ABNORMAL
EOSINOPHIL # BLD AUTO: 0 K/UL — SIGNIFICANT CHANGE UP (ref 0–0.5)
EOSINOPHIL NFR BLD AUTO: 0 % — SIGNIFICANT CHANGE UP (ref 0–6)
EPI CELLS # UR: SIGNIFICANT CHANGE UP
GIANT PLATELETS BLD QL SMEAR: PRESENT — SIGNIFICANT CHANGE UP
GLUCOSE BLDC GLUCOMTR-MCNC: 100 MG/DL — HIGH (ref 70–99)
GLUCOSE BLDC GLUCOMTR-MCNC: 123 MG/DL — HIGH (ref 70–99)
GLUCOSE BLDC GLUCOMTR-MCNC: 82 MG/DL — SIGNIFICANT CHANGE UP (ref 70–99)
GLUCOSE SERPL-MCNC: 111 MG/DL — HIGH (ref 70–99)
GLUCOSE UR QL: NEGATIVE MG/DL — SIGNIFICANT CHANGE UP
GRAM STN FLD: SIGNIFICANT CHANGE UP
GRAM STN FLD: SIGNIFICANT CHANGE UP
HCT VFR BLD CALC: 36.1 % — SIGNIFICANT CHANGE UP (ref 34.5–45)
HGB BLD-MCNC: 11.7 G/DL — SIGNIFICANT CHANGE UP (ref 11.5–15.5)
HYALINE CASTS # UR AUTO: ABNORMAL /LPF
KETONES UR-MCNC: NEGATIVE — SIGNIFICANT CHANGE UP
LACTATE SERPL-SCNC: 1.2 MMOL/L — SIGNIFICANT CHANGE UP (ref 0.5–2)
LEUKOCYTE ESTERASE UR-ACNC: NEGATIVE — SIGNIFICANT CHANGE UP
LYMPHOCYTES # BLD AUTO: 0.5 K/UL — LOW (ref 1–3.3)
LYMPHOCYTES # BLD AUTO: 2.6 % — LOW (ref 13–44)
MAGNESIUM SERPL-MCNC: 1.9 MG/DL — SIGNIFICANT CHANGE UP (ref 1.6–2.6)
MANUAL SMEAR VERIFICATION: SIGNIFICANT CHANGE UP
MCHC RBC-ENTMCNC: 28.3 PG — SIGNIFICANT CHANGE UP (ref 27–34)
MCHC RBC-ENTMCNC: 32.4 GM/DL — SIGNIFICANT CHANGE UP (ref 32–36)
MCV RBC AUTO: 87.4 FL — SIGNIFICANT CHANGE UP (ref 80–100)
MONOCYTES # BLD AUTO: 1.35 K/UL — HIGH (ref 0–0.9)
MONOCYTES NFR BLD AUTO: 7 % — SIGNIFICANT CHANGE UP (ref 2–14)
NEUTROPHILS # BLD AUTO: 17.26 K/UL — HIGH (ref 1.8–7.4)
NEUTROPHILS NFR BLD AUTO: 87.8 % — HIGH (ref 43–77)
NEUTS BAND # BLD: 1.7 % — SIGNIFICANT CHANGE UP (ref 0–8)
NITRITE UR-MCNC: NEGATIVE — SIGNIFICANT CHANGE UP
PH UR: 5 — SIGNIFICANT CHANGE UP (ref 5–8)
PHOSPHATE SERPL-MCNC: 2.7 MG/DL — SIGNIFICANT CHANGE UP (ref 2.4–4.7)
PLAT MORPH BLD: NORMAL — SIGNIFICANT CHANGE UP
PLATELET # BLD AUTO: 453 K/UL — HIGH (ref 150–400)
POIKILOCYTOSIS BLD QL AUTO: SLIGHT — SIGNIFICANT CHANGE UP
POTASSIUM SERPL-MCNC: 3.9 MMOL/L — SIGNIFICANT CHANGE UP (ref 3.5–5.3)
POTASSIUM SERPL-SCNC: 3.9 MMOL/L — SIGNIFICANT CHANGE UP (ref 3.5–5.3)
PROT SERPL-MCNC: 4.5 G/DL — LOW (ref 6.6–8.7)
PROT UR-MCNC: 30 MG/DL
RBC # BLD: 4.13 M/UL — SIGNIFICANT CHANGE UP (ref 3.8–5.2)
RBC # FLD: 14.8 % — HIGH (ref 10.3–14.5)
RBC BLD AUTO: ABNORMAL
RBC CASTS # UR COMP ASSIST: SIGNIFICANT CHANGE UP /HPF (ref 0–4)
SODIUM SERPL-SCNC: 137 MMOL/L — SIGNIFICANT CHANGE UP (ref 135–145)
SP GR SPEC: 1.02 — SIGNIFICANT CHANGE UP (ref 1.01–1.02)
SPECIMEN SOURCE: SIGNIFICANT CHANGE UP
SPECIMEN SOURCE: SIGNIFICANT CHANGE UP
UROBILINOGEN FLD QL: NEGATIVE MG/DL — SIGNIFICANT CHANGE UP
VARIANT LYMPHS # BLD: 0.9 % — SIGNIFICANT CHANGE UP (ref 0–6)
WBC # BLD: 19.29 K/UL — HIGH (ref 3.8–10.5)
WBC # FLD AUTO: 19.29 K/UL — HIGH (ref 3.8–10.5)
WBC UR QL: SIGNIFICANT CHANGE UP

## 2021-12-12 PROCEDURE — 99232 SBSQ HOSP IP/OBS MODERATE 35: CPT

## 2021-12-12 RX ORDER — POTASSIUM CHLORIDE 20 MEQ
10 PACKET (EA) ORAL ONCE
Refills: 0 | Status: COMPLETED | OUTPATIENT
Start: 2021-12-12 | End: 2021-12-12

## 2021-12-12 RX ORDER — MAGNESIUM SULFATE 500 MG/ML
1 VIAL (ML) INJECTION ONCE
Refills: 0 | Status: COMPLETED | OUTPATIENT
Start: 2021-12-12 | End: 2021-12-12

## 2021-12-12 RX ADMIN — Medication 3 MILLILITER(S): at 09:20

## 2021-12-12 RX ADMIN — Medication 3 MILLILITER(S): at 22:07

## 2021-12-12 RX ADMIN — Medication 1 SPRAY(S): at 06:40

## 2021-12-12 RX ADMIN — Medication 3 MILLILITER(S): at 04:31

## 2021-12-12 RX ADMIN — ENOXAPARIN SODIUM 40 MILLIGRAM(S): 100 INJECTION SUBCUTANEOUS at 13:05

## 2021-12-12 RX ADMIN — PIPERACILLIN AND TAZOBACTAM 25 GRAM(S): 4; .5 INJECTION, POWDER, LYOPHILIZED, FOR SOLUTION INTRAVENOUS at 06:39

## 2021-12-12 RX ADMIN — Medication 100 MILLIEQUIVALENT(S): at 13:06

## 2021-12-12 RX ADMIN — SODIUM CHLORIDE 100 MILLILITER(S): 9 INJECTION, SOLUTION INTRAVENOUS at 09:30

## 2021-12-12 RX ADMIN — Medication 800 MILLIGRAM(S): at 06:39

## 2021-12-12 RX ADMIN — Medication 100 GRAM(S): at 13:00

## 2021-12-12 RX ADMIN — Medication 12.5 MICROGRAM(S): at 06:39

## 2021-12-12 RX ADMIN — PIPERACILLIN AND TAZOBACTAM 25 GRAM(S): 4; .5 INJECTION, POWDER, LYOPHILIZED, FOR SOLUTION INTRAVENOUS at 22:07

## 2021-12-12 RX ADMIN — PIPERACILLIN AND TAZOBACTAM 25 GRAM(S): 4; .5 INJECTION, POWDER, LYOPHILIZED, FOR SOLUTION INTRAVENOUS at 13:07

## 2021-12-12 RX ADMIN — Medication 1 SPRAY(S): at 18:13

## 2021-12-12 NOTE — PROGRESS NOTE ADULT - SUBJECTIVE AND OBJECTIVE BOX
NANCY HIGHTOWERONUR    [id number]    61y      Female    Patient is a 61y old  Female who presents with a chief complaint of Volvulus (12 Dec 2021 01:35)      INTERVAL HPI/OVERNIGHT EVENTS:    Limited info as patient has MR, she is aphasic, as per nursing staff, has NG tube, CT of the abdomen done showed Pneumoperitoneum     REVIEW OF SYSTEMS:    Unable to obtain       Vital Signs Last 24 Hrs  T(C): 36.4 (12 Dec 2021 05:59), Max: 37.2 (11 Dec 2021 11:11)  T(F): 97.5 (12 Dec 2021 05:59), Max: 98.9 (11 Dec 2021 11:11)  HR: 91 (12 Dec 2021 05:59) (91 - 120)  BP: 94/66 (12 Dec 2021 05:59) (94/66 - 112/69)  RR: 18 (12 Dec 2021 05:59) (18 - 19)  SpO2: 99% (12 Dec 2021 05:59) (93% - 99%)    PHYSICAL EXAM:    GENERAL: Middle age female looking comfortable    HEENT: atraumatic, has NG tube in   NECK: soft, Supple, No JVD,   CHEST/LUNG: Decrease air entry bilaterally; No wheezing  HEART: S1S2+, Regular rate and rhythm; No murmurs  ABDOMEN: firm consistency, diffused tenderness, Nondistended; sluggish Bowel sounds present  EXTREMITIES:  1+ Peripheral Pulses, No edema  SKIN: No rashes or lesions  NEURO: not participating in exam, aphasic    LABS:                        11.7   19.29 )-----------( 453      ( 12 Dec 2021 07:09 )             36.1     12-    137  |  100  |  14.4  ----------------------------<  111<H>  3.9   |  27.0  |  0.48<L>    Ca    8.5<L>      12 Dec 2021 07:09  Phos  2.7     12-12  Mg     1.9     12-12    TPro  4.5<L>  /  Alb  2.0<L>  /  TBili  0.3<L>  /  DBili  0.1  /  AST  17  /  ALT  18  /  AlkPhos  87  12-12      Urinalysis Basic - ( 12 Dec 2021 05:44 )    Color: Yellow / Appearance: Clear / S.020 / pH: x  Gluc: x / Ketone: Negative  / Bili: Negative / Urobili: Negative mg/dL   Blood: x / Protein: 30 mg/dL / Nitrite: Negative   Leuk Esterase: Negative / RBC: 0-2 /HPF / WBC 0-2   Sq Epi: x / Non Sq Epi: Few / Bacteria: Few          I&O's Summary    11 Dec 2021 07:01  -  12 Dec 2021 07:00  --------------------------------------------------------  IN: 0 mL / OUT: 650 mL / NET: -650 mL        MEDICATIONS  (STANDING):  albuterol/ipratropium for Nebulization 3 milliLiter(s) Nebulizer every 6 hours  cholecalciferol 2000 Unit(s) Oral daily  dextrose 40% Gel 15 Gram(s) Oral once  dextrose 5% + lactated ringers. 1000 milliLiter(s) (100 mL/Hr) IV Continuous <Continuous>  dextrose 5% + sodium chloride 0.9% 1000 milliLiter(s) (100 mL/Hr) IV Continuous <Continuous>  dextrose 5%. 1000 milliLiter(s) (50 mL/Hr) IV Continuous <Continuous>  dextrose 5%. 1000 milliLiter(s) (100 mL/Hr) IV Continuous <Continuous>  dextrose 50% Injectable 25 Gram(s) IV Push once  dextrose 50% Injectable 12.5 Gram(s) IV Push once  dextrose 50% Injectable 25 Gram(s) IV Push once  enoxaparin Injectable 40 milliGRAM(s) SubCutaneous daily  escitalopram 5 milliGRAM(s) Oral daily  fluticasone propionate 50 MICROgram(s)/spray Nasal Spray 1 Spray(s) Both Nostrils every 12 hours  glucagon  Injectable 1 milliGRAM(s) IntraMuscular once  insulin lispro (ADMELOG) corrective regimen sliding scale   SubCutaneous every 6 hours  lactobacillus acidophilus 1 Tablet(s) Oral daily  levothyroxine Injectable 12.5 MICROGram(s) IV Push <User Schedule>  loratadine 10 milliGRAM(s) Oral daily  magnesium sulfate  IVPB 1 Gram(s) IV Intermittent once  mesalamine DR Capsule 800 milliGRAM(s) Oral every 12 hours  piperacillin/tazobactam IVPB.. 3.375 Gram(s) IV Intermittent every 8 hours  polyethylene glycol 3350 17 Gram(s) Oral daily  potassium chloride  10 mEq/100 mL IVPB 10 milliEquivalent(s) IV Intermittent once    MEDICATIONS  (PRN):  ALBUTerol    90 MICROgram(s) HFA Inhaler 2 Puff(s) Inhalation every 6 hours PRN Shortness of Breath and/or Wheezing  aluminum hydroxide/magnesium hydroxide/simethicone Suspension 30 milliLiter(s) Oral every 4 hours PRN Dyspepsia

## 2021-12-12 NOTE — PROGRESS NOTE ADULT - SUBJECTIVE AND OBJECTIVE BOX
Colorectal Surgery Progress Note:    Patient evaluated at bedside. No acute distress. Yesterday patient tachycardic and hypotensive responding to IVF. Schuster placed for monitoring. Medicine reconsulted for help in management. CT AP done with persistent pneumoperitoneum and ascites, IR contacted for TAP to be able to tailor treatment.     Diet, Pureed:   Moderately Thick Liquids (MODTHICKLIQS)  Tube Feeding Modality: Nasogastric  Pivot 1.5 [place] (PIVOT1.5RTH)  Total Volume for 24 Hours (mL): 720  Continuous  Starting Tube Feed Rate mL per Hour: 10  Until Goal Tube Feed Rate (mL per Hour): 30  Tube Feed Duration (in Hours): 24  Tube Feed Start Time: 13:30  Free Water Flush   Total Volume per Flush (mL): 150   Frequency: Every 4 Hours   Total Daily Volume of Flush (mL): 900    Start Time: 13:30 (12-09-21 @ 12:59)      Scheduled Medications:   albuterol/ipratropium for Nebulization 3 milliLiter(s) Nebulizer every 6 hours  cefTRIAXone   IVPB 1000 milliGRAM(s) IV Intermittent every 24 hours  cholecalciferol 2000 Unit(s) Oral daily  dextrose 40% Gel 15 Gram(s) Oral once  dextrose 5% + lactated ringers. 1000 milliLiter(s) (100 mL/Hr) IV Continuous <Continuous>  dextrose 5%. 1000 milliLiter(s) (50 mL/Hr) IV Continuous <Continuous>  dextrose 5%. 1000 milliLiter(s) (100 mL/Hr) IV Continuous <Continuous>  dextrose 50% Injectable 25 Gram(s) IV Push once  dextrose 50% Injectable 12.5 Gram(s) IV Push once  dextrose 50% Injectable 25 Gram(s) IV Push once  enoxaparin Injectable 40 milliGRAM(s) SubCutaneous daily  escitalopram 5 milliGRAM(s) Oral daily  fluticasone propionate 50 MICROgram(s)/spray Nasal Spray 1 Spray(s) Both Nostrils every 12 hours  glucagon  Injectable 1 milliGRAM(s) IntraMuscular once  insulin lispro (ADMELOG) corrective regimen sliding scale   SubCutaneous every 6 hours  lactobacillus acidophilus 1 Tablet(s) Oral daily  levothyroxine Injectable 12.5 MICROGram(s) IV Push <User Schedule>  loratadine 10 milliGRAM(s) Oral daily  mesalamine DR Capsule 800 milliGRAM(s) Oral every 12 hours  polyethylene glycol 3350 17 Gram(s) Oral daily    PRN Medications:  ALBUTerol    90 MICROgram(s) HFA Inhaler 2 Puff(s) Inhalation every 6 hours PRN Shortness of Breath and/or Wheezing  aluminum hydroxide/magnesium hydroxide/simethicone Suspension 30 milliLiter(s) Oral every 4 hours PRN Dyspepsia      Objective:   T(F): 99.8 (12-11 @ 00:15), Max: 99.8 (12-11 @ 00:15)  HR: 95 (12-11 @ 02:00) (82 - 105)  BP: 101/66 (12-11 @ 02:00) (86/54 - 118/77)  BP(mean): --  ABP: --  ABP(mean): --  RR: 18 (12-11 @ 02:00) (17 - 18)  SpO2: 94% (12-11 @ 02:00) (91% - 94%)      Physical Exam:   GEN: patient resting comfortably in bed, in no acute distress, NGT in place and connected to suction  RESP: respirations are unlabored, no accessory muscle use, no conversational dyspnea  CVS: RRR  GI: Abdomen soft, non-tender, non-distended, no rebound tenderness / guarding; ostomy stoma pink and patent, fecal fluid material in bag, midline incision site dry     I&O's    12-09 @ 07:01  -  12-10 @ 07:00  --------------------------------------------------------  IN:    dextrose 5% + lactated ringers: 2400 mL    Vital1.5: 120 mL  Total IN: 2520 mL    OUT:  Total OUT: 0 mL    Total NET: 2520 mL          LABS:                        12.6   16.35 )-----------( 359      ( 10 Dec 2021 05:11 )             39.5     12-10    138  |  103  |  7.0<L>  ----------------------------<  105<H>  4.5   |  29.0  |  0.39<L>    Ca    8.2<L>      10 Dec 2021 05:11  Phos  2.7     12-10  Mg     1.9     12-10            MICROBIOLOGY:       PATHOLOGY:  Surgical Pathology Report:   ACCESSION No:  [id number]      Accession:                             [id number]    Collected Date/Time:                   11/29/2021 17:00 EST  Received Date/Time:                    11/30/2021 11:34 EST    Surgical Pathology Report - Auth (Verified)    Specimen(s) Submitted  1  Sigmoid colon  2  Portion sigmoid colon    Final Diagnosis    1. Sigmoid colon, segmental colectomy ( length = 57.5 cm)  - Volulus with acute colitis and ischemic colitis changes    - Negative for chronic changes of inflammatory bowel disease,  dysplasia,  or malignancy  - Lymph node x 6 with sinus histiocytosis    2. Sigmoid colon, segmental colectomy ( length = 3.5 cm)  - Acute colitis with marked mural edema  - Negative for chronic changes of inflammatorybowel disease,  dysplasia,  or malignancy  - Lymph node x 1 , unremarkable  Verified by: Dion Murphy M.D.  (Electronic Signature)  Reported on: 12/08/21 18:31 [facility], [facility], [address] [place] [id number]  _________________________________________________________________      Clinical Information  Sigmoid volvulus    Gross Description  1.  Received: Fresh labeled  "sigmoid colon" , consisting of an unoriented  dilated twisted bowel segment  Integrity:  Intact, dilated  Length:  57.5 g  Luminal Circumference:   Ranging from 12 cm to 20 cm  Wall:  0.5 cm, 0.7 cm  Serosa:  Glistening pink tan and smooth with petechia  Mucosa:  Pink-tan, and focally hemorrhagic, with few areas of tan filmy  material adherent to the mucosal surface.  Attached Fat:  The attached fat is glistening, partly covered by smooth  pink white soft membranous tissue. The fat appears focally slightly  edematous and focally hemorrhagic with prominent appearing vessels with  thicknened vascular walls. There are several tan rubbery lymphoid nodules  identified.  Additional Findings:   There is a separate piece of bowel in the  container,  measuring 7.5 x 1.5 x 1.5 cm, with a purple tan smooth focally  hemorrhagic serosa and pink-tan and unremarkable mucosa.  Submitted:  Representative sections in 7 cassettes:  1A-1C: Representaitve bowel  1D: Representative fat  1E: Representative fat and blood vessels  1F: Representative lymph nodes  1G: Representative separate piece    2.  Received: In formalin labeled  "portion of sigmoid colon" ,  consisting of  an unoriented segment of bowel, with open ends.  Dimensions:  3.5 cm in length and 8.5 cm in circumference  Additional Comments:   The mucosa is dark red tan, with an irregular tan  filmy area identified at the mucosal surface, which abuts both ends. One  end is inked black and the opposite end is inked blue. The bowel wall    ranges from 0.5-0.9 cm in thickness.  Submitted:  Representative tissue in two cassettes: 2A-2B    Ana Marely 12/01/2021 11:25 AM    Disclaimer  In addition to other data that may appear on the specimen containers, all  labels have been inspected to confirm the presence of the patient's name  and date of birth.    Histological Processing Performed at [facility],  Department of Pathology, [address] [place] [address]. (11-29-21 @ 17:00)

## 2021-12-12 NOTE — CHART NOTE - NSCHARTNOTEFT_GEN_A_CORE
Calorie count ordered (12/11-12/13); sheets hung in pts room, RN aware. NGT in place- enteral nutrition not running at this time/pt NPO.    Diet, NPO:   Except Medications (12-11-21 @ 20:08)    Day 1: 12/11  Breakfast: 0% consumed  Lunch: <200kcal  Dinner: 0% consumed    Pt is not meeting estimated energy/protein needs at this time. RD to follow up for day 2 (12/12)- NPO order noted.

## 2021-12-12 NOTE — PROGRESS NOTE ADULT - ATTENDING COMMENTS
Seen and examined.  Events of overnight noted.  CT A/P with IV contrast demonstrated worsening pneumoperitoneum without source of perforation in addition to ascites.  Remained HD stable overnight.  IR placed two 14Fr drains with return of serous fluid only.  AFVSS  NAD  incision CDI, colostomy in place, NTND  Labs reviewed  A/P -  Mom Carley, who is 90 with slight memory loss and who is HCP and lives in [place], updated.  Uncertain source of pneumoperitoneum but this has been persistent in postop imaging and each time, drainage from abdomen only yields serous fluid and not succus/feculent material.  Noted hospitalist note that pt normally takes torsamide.   ?SBP as source for pneumoperitoneum  For now, cont NPO, IVF and abx.  WIll reassess tomorrow AM re: resumption of TF.  Mom in the past has refused PEG placement to facilitate nutrition. Will likely readdress later this week as well.

## 2021-12-12 NOTE — PROGRESS NOTE ADULT - ASSESSMENT
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   Due to likelihood of recurrence, surgical intervention was recommended.   Now s/p open sigmoidectomy with ostomy creation. Patient progressing well with functioning ostomy. NG tube   Placed for nutritional support, incidentally discovered to have a pneumoperitoneum after work up.  CT imaging with IV contrast confirmed pneumoperitoneum and ascites, no evidence of perforated viscus but that assessment could not be ruled out.  She was seen by speech and swallow that recommended pureed diet, moderately thick with supplemental tube feeds.    Patient with intermittent hypotension and tachycardia now, repeat CT with worsening pneumoperitoneum, made NPO and NGT in suction now.     PLAN:    - Pain control MM   - Monitor ostomy o/p   - Monitor midline wound output   - NPO  - Monitor lytes &  replete PRN   - Monitor lower extremity edema and upper extremity temperatures/cyanosis  - Monitor vitals  - F/U Med recs  - IR evaluation for TAP  - Continue with Zosyn

## 2021-12-12 NOTE — BRIEF OPERATIVE NOTE - OPERATION/FINDINGS
1) Access of RUQ subdiaphragmatic collection and RLQ free fluid with 18G trocar  2) 14F drain left at each site  3) Both sites draining clear yellow serous fluid, 1000cc out in total, left to gravity thereafter

## 2021-12-12 NOTE — PROGRESS NOTE ADULT - ASSESSMENT
61 year old female with h/o severe developmental delay (MR and nonverbal at baseline), osteoporosis, hypothyroidism transferred to [facility] with sigmoid volvulus s/p status post colonoscopic decompression and rectal tube placement 11/27 and Bruce's procedure on 11/29, over the course she was having hypotension, abdominal distention and worsening leucocytosis, her feeding were held and was started on IV fluids and NG was placed, CT scan done showed Worsening of large pneumoperitoneum, she was initially give ceftriaxone that has been changed to Zosyn, blood cultures has been obtained, medicine consulted for medical Management      Plan:     Sepsis due to worsening Pneumoperitoneum:   Has NG connected with intermittent suction  will continue with gentle hydration, Repeat Blood cultures sent,   as per Primary team IR consulted for aspiration, will send fluids for Cultures Continue PipTazo 3.375 gm Q 6 hours for now   UA looks ok   Daily abd exams and repeat blood cultures daily IF blood cultures yield growth (till neg)   Monitor CBC for leucocytosis.       # Sigmoid volvulus- now post op s/p status post colonoscopic decompression and rectal tube placement 11/27 and Bruce's procedure on 11/29  Management as per Primary team  Wound care      # Intellectual disability, Non verbal at baseline, Autism  - supportive care    # IBS  - mesalamine.     # depression: escitalopram once started on oral meds     # Hypothyroidism  - levothyroxine changed to IV, can change to PO when start PO       patient mom Carley on phone [phone] is the NOK, Mom is requesting for update,     patient is from [facility]    Patient medication list shows she is on Torsamide 20mg daily, shown on her group home papers as well, asked her mom she has no idea, tried calling group [facility] could not get hold of any one, might need to call tomorrow to know why she was on it, will hold for now as she is NPO.

## 2021-12-13 LAB
ALBUMIN SERPL ELPH-MCNC: 1.7 G/DL — LOW (ref 3.3–5.2)
ALP SERPL-CCNC: 100 U/L — SIGNIFICANT CHANGE UP (ref 40–120)
ALT FLD-CCNC: 17 U/L — SIGNIFICANT CHANGE UP
ANION GAP SERPL CALC-SCNC: 8 MMOL/L — SIGNIFICANT CHANGE UP (ref 5–17)
AST SERPL-CCNC: 17 U/L — SIGNIFICANT CHANGE UP
BASOPHILS # BLD AUTO: 0.02 K/UL — SIGNIFICANT CHANGE UP (ref 0–0.2)
BASOPHILS NFR BLD AUTO: 0.2 % — SIGNIFICANT CHANGE UP (ref 0–2)
BILIRUB DIRECT SERPL-MCNC: 0.1 MG/DL — SIGNIFICANT CHANGE UP (ref 0–0.3)
BILIRUB INDIRECT FLD-MCNC: 0.3 MG/DL — SIGNIFICANT CHANGE UP (ref 0.2–1)
BILIRUB SERPL-MCNC: 0.4 MG/DL — SIGNIFICANT CHANGE UP (ref 0.4–2)
BUN SERPL-MCNC: 9.5 MG/DL — SIGNIFICANT CHANGE UP (ref 8–20)
CALCIUM SERPL-MCNC: 7.8 MG/DL — LOW (ref 8.6–10.2)
CHLORIDE SERPL-SCNC: 104 MMOL/L — SIGNIFICANT CHANGE UP (ref 98–107)
CO2 SERPL-SCNC: 28 MMOL/L — SIGNIFICANT CHANGE UP (ref 22–29)
CREAT SERPL-MCNC: 0.43 MG/DL — LOW (ref 0.5–1.3)
EOSINOPHIL # BLD AUTO: 0.12 K/UL — SIGNIFICANT CHANGE UP (ref 0–0.5)
EOSINOPHIL NFR BLD AUTO: 0.9 % — SIGNIFICANT CHANGE UP (ref 0–6)
GLUCOSE BLDC GLUCOMTR-MCNC: 101 MG/DL — HIGH (ref 70–99)
GLUCOSE BLDC GLUCOMTR-MCNC: 143 MG/DL — HIGH (ref 70–99)
GLUCOSE BLDC GLUCOMTR-MCNC: 208 MG/DL — HIGH (ref 70–99)
GLUCOSE BLDC GLUCOMTR-MCNC: 41 MG/DL — CRITICAL LOW (ref 70–99)
GLUCOSE BLDC GLUCOMTR-MCNC: 49 MG/DL — CRITICAL LOW (ref 70–99)
GLUCOSE BLDC GLUCOMTR-MCNC: 79 MG/DL — SIGNIFICANT CHANGE UP (ref 70–99)
GLUCOSE SERPL-MCNC: 246 MG/DL — HIGH (ref 70–99)
HCT VFR BLD CALC: 34.8 % — SIGNIFICANT CHANGE UP (ref 34.5–45)
HGB BLD-MCNC: 11.4 G/DL — LOW (ref 11.5–15.5)
IMM GRANULOCYTES NFR BLD AUTO: 0.9 % — SIGNIFICANT CHANGE UP (ref 0–1.5)
INR BLD: 1.15 RATIO — SIGNIFICANT CHANGE UP (ref 0.88–1.16)
LYMPHOCYTES # BLD AUTO: 0.63 K/UL — LOW (ref 1–3.3)
LYMPHOCYTES # BLD AUTO: 5 % — LOW (ref 13–44)
MAGNESIUM SERPL-MCNC: 2 MG/DL — SIGNIFICANT CHANGE UP (ref 1.6–2.6)
MCHC RBC-ENTMCNC: 28.9 PG — SIGNIFICANT CHANGE UP (ref 27–34)
MCHC RBC-ENTMCNC: 32.8 GM/DL — SIGNIFICANT CHANGE UP (ref 32–36)
MCV RBC AUTO: 88.3 FL — SIGNIFICANT CHANGE UP (ref 80–100)
MONOCYTES # BLD AUTO: 0.94 K/UL — HIGH (ref 0–0.9)
MONOCYTES NFR BLD AUTO: 7.4 % — SIGNIFICANT CHANGE UP (ref 2–14)
NEUTROPHILS # BLD AUTO: 10.85 K/UL — HIGH (ref 1.8–7.4)
NEUTROPHILS NFR BLD AUTO: 85.6 % — HIGH (ref 43–77)
PHOSPHATE SERPL-MCNC: 2.5 MG/DL — SIGNIFICANT CHANGE UP (ref 2.4–4.7)
PLATELET # BLD AUTO: 453 K/UL — HIGH (ref 150–400)
POTASSIUM SERPL-MCNC: 3.9 MMOL/L — SIGNIFICANT CHANGE UP (ref 3.5–5.3)
POTASSIUM SERPL-SCNC: 3.9 MMOL/L — SIGNIFICANT CHANGE UP (ref 3.5–5.3)
PROT SERPL-MCNC: 4.5 G/DL — LOW (ref 6.6–8.7)
PROTHROM AB SERPL-ACNC: 13.2 SEC — SIGNIFICANT CHANGE UP (ref 10.6–13.6)
RBC # BLD: 3.94 M/UL — SIGNIFICANT CHANGE UP (ref 3.8–5.2)
RBC # FLD: 15 % — HIGH (ref 10.3–14.5)
SODIUM SERPL-SCNC: 140 MMOL/L — SIGNIFICANT CHANGE UP (ref 135–145)
WBC # BLD: 12.67 K/UL — HIGH (ref 3.8–10.5)
WBC # FLD AUTO: 12.67 K/UL — HIGH (ref 3.8–10.5)

## 2021-12-13 PROCEDURE — 99233 SBSQ HOSP IP/OBS HIGH 50: CPT

## 2021-12-13 RX ORDER — DEXTROSE 50 % IN WATER 50 %
50 SYRINGE (ML) INTRAVENOUS ONCE
Refills: 0 | Status: DISCONTINUED | OUTPATIENT
Start: 2021-12-13 | End: 2021-12-16

## 2021-12-13 RX ORDER — POTASSIUM PHOSPHATE, MONOBASIC POTASSIUM PHOSPHATE, DIBASIC 236; 224 MG/ML; MG/ML
15 INJECTION, SOLUTION INTRAVENOUS ONCE
Refills: 0 | Status: COMPLETED | OUTPATIENT
Start: 2021-12-13 | End: 2021-12-13

## 2021-12-13 RX ORDER — SODIUM CHLORIDE 9 MG/ML
1000 INJECTION, SOLUTION INTRAVENOUS ONCE
Refills: 0 | Status: COMPLETED | OUTPATIENT
Start: 2021-12-13 | End: 2021-12-13

## 2021-12-13 RX ORDER — DEXTROSE 50 % IN WATER 50 %
25 SYRINGE (ML) INTRAVENOUS ONCE
Refills: 0 | Status: COMPLETED | OUTPATIENT
Start: 2021-12-13 | End: 2021-12-13

## 2021-12-13 RX ORDER — DEXTROSE 50 % IN WATER 50 %
15 SYRINGE (ML) INTRAVENOUS ONCE
Refills: 0 | Status: COMPLETED | OUTPATIENT
Start: 2021-12-13 | End: 2021-12-13

## 2021-12-13 RX ADMIN — Medication 3 MILLILITER(S): at 21:14

## 2021-12-13 RX ADMIN — PIPERACILLIN AND TAZOBACTAM 25 GRAM(S): 4; .5 INJECTION, POWDER, LYOPHILIZED, FOR SOLUTION INTRAVENOUS at 22:19

## 2021-12-13 RX ADMIN — Medication 25 GRAM(S): at 07:17

## 2021-12-13 RX ADMIN — ESCITALOPRAM OXALATE 5 MILLIGRAM(S): 10 TABLET, FILM COATED ORAL at 11:52

## 2021-12-13 RX ADMIN — Medication 1 SPRAY(S): at 18:32

## 2021-12-13 RX ADMIN — PIPERACILLIN AND TAZOBACTAM 25 GRAM(S): 4; .5 INJECTION, POWDER, LYOPHILIZED, FOR SOLUTION INTRAVENOUS at 05:29

## 2021-12-13 RX ADMIN — Medication 1 SPRAY(S): at 05:29

## 2021-12-13 RX ADMIN — Medication 1 TABLET(S): at 11:52

## 2021-12-13 RX ADMIN — Medication 2000 UNIT(S): at 11:51

## 2021-12-13 RX ADMIN — POTASSIUM PHOSPHATE, MONOBASIC POTASSIUM PHOSPHATE, DIBASIC 62.5 MILLIMOLE(S): 236; 224 INJECTION, SOLUTION INTRAVENOUS at 12:19

## 2021-12-13 RX ADMIN — POLYETHYLENE GLYCOL 3350 17 GRAM(S): 17 POWDER, FOR SOLUTION ORAL at 11:51

## 2021-12-13 RX ADMIN — PIPERACILLIN AND TAZOBACTAM 25 GRAM(S): 4; .5 INJECTION, POWDER, LYOPHILIZED, FOR SOLUTION INTRAVENOUS at 15:56

## 2021-12-13 RX ADMIN — Medication 3 MILLILITER(S): at 09:29

## 2021-12-13 RX ADMIN — Medication 12.5 MICROGRAM(S): at 05:28

## 2021-12-13 RX ADMIN — SODIUM CHLORIDE 1000 MILLILITER(S): 9 INJECTION, SOLUTION INTRAVENOUS at 22:28

## 2021-12-13 RX ADMIN — ENOXAPARIN SODIUM 40 MILLIGRAM(S): 100 INJECTION SUBCUTANEOUS at 11:51

## 2021-12-13 RX ADMIN — Medication 25 GRAM(S): at 08:29

## 2021-12-13 RX ADMIN — LORATADINE 10 MILLIGRAM(S): 10 TABLET ORAL at 12:03

## 2021-12-13 NOTE — PROGRESS NOTE ADULT - SUBJECTIVE AND OBJECTIVE BOX
Colorectal Surgery Progress Note:    Patient evaluated at bedside. No acute distress. Yesterday patient tachycardic and hypotensive responding to IVF. Schuster placed for monitoring. Medicine reconsulted for help in management. CT AP done with persistent pneumoperitoneum and ascites, IR contacted for TAP and placed two drains in the RLQ and RUQ draining serous fluid, cultures sent, otherwise patient well.      Diet, NPO:   Except Medications (21 @ 20:08) [Active]    Scheduled Medications:   albuterol/ipratropium for Nebulization 3 milliLiter(s) Nebulizer every 6 hours  cefTRIAXone   IVPB 1000 milliGRAM(s) IV Intermittent every 24 hours  cholecalciferol 2000 Unit(s) Oral daily  dextrose 40% Gel 15 Gram(s) Oral once  dextrose 5% + lactated ringers. 1000 milliLiter(s) (100 mL/Hr) IV Continuous <Continuous>  dextrose 5%. 1000 milliLiter(s) (50 mL/Hr) IV Continuous <Continuous>  dextrose 5%. 1000 milliLiter(s) (100 mL/Hr) IV Continuous <Continuous>  dextrose 50% Injectable 25 Gram(s) IV Push once  dextrose 50% Injectable 12.5 Gram(s) IV Push once  dextrose 50% Injectable 25 Gram(s) IV Push once  enoxaparin Injectable 40 milliGRAM(s) SubCutaneous daily  escitalopram 5 milliGRAM(s) Oral daily  fluticasone propionate 50 MICROgram(s)/spray Nasal Spray 1 Spray(s) Both Nostrils every 12 hours  glucagon  Injectable 1 milliGRAM(s) IntraMuscular once  insulin lispro (ADMELOG) corrective regimen sliding scale   SubCutaneous every 6 hours  lactobacillus acidophilus 1 Tablet(s) Oral daily  levothyroxine Injectable 12.5 MICROGram(s) IV Push <User Schedule>  loratadine 10 milliGRAM(s) Oral daily  mesalamine DR Capsule 800 milliGRAM(s) Oral every 12 hours  polyethylene glycol 3350 17 Gram(s) Oral daily    PRN Medications:  ALBUTerol    90 MICROgram(s) HFA Inhaler 2 Puff(s) Inhalation every 6 hours PRN Shortness of Breath and/or Wheezing  aluminum hydroxide/magnesium hydroxide/simethicone Suspension 30 milliLiter(s) Oral every 4 hours PRN Dyspepsia    Vital Signs Last 24 Hrs  T(C): 36.4 (12 Dec 2021 21:02), Max: 36.8 (12 Dec 2021 12:42)  T(F): 97.5 (12 Dec 2021 21:02), Max: 98.2 (12 Dec 2021 12:42)  HR: 100 (12 Dec 2021 21:02) (86 - 102)  BP: 100/60 (12 Dec 2021 21:02) (94/61 - 102/65)  BP(mean): --  ABP: --  ABP(mean): --  RR: 18 (12 Dec 2021 21:02) (18 - 18)  SpO2: 91% (12 Dec 2021 21:02) (91% - 99%)      Physical Exam:   GEN: patient resting comfortably in bed, in no acute distress, NGT in place and connected to suction  RESP: respirations are unlabored, no accessory muscle use, no conversational dyspnea  CVS: RRR  GI: Abdomen soft, non-tender, non-distended, no rebound tenderness / guarding; ostomy stoma pink and patent, fecal fluid material in bag, midline incision site dry. Drains in RUQ and RLQ draining serous fluid.       I&O's Detail    11 Dec 2021 07:01  -  12 Dec 2021 07:00  --------------------------------------------------------  IN:    dextrose 5% + lactated ringers: 700 mL    Free Water: 600 mL    Vital1.5: 265 mL  Total IN: 1565 mL    OUT:    Colostomy (mL): 200 mL    Indwelling Catheter - Urethral (mL): 500 mL    Nasogastric/Oral tube (mL): 150 mL  Total OUT: 850 mL    Total NET: 715 mL      12 Dec 2021 07:01  -  13 Dec 2021 02:04  --------------------------------------------------------  IN:    dextrose 5% + sodium chloride 0.9% w/ Additives: 1000 mL    IV PiggyBack: 100 mL  Total IN: 1100 mL    OUT:    Colostomy (mL): 100 mL    Drain (mL): 1000 mL    Drain (mL): 600 mL    Indwelling Catheter - Urethral (mL): 200 mL    Nasogastric/Oral tube (mL): 250 mL    Oral Fluid: 0 mL    Vital1.5: 0 mL  Total OUT: 2150 mL    Total NET: -1050 mL      .  LABS:                         11.7   19.29 )-----------( 453      ( 12 Dec 2021 07:09 )             36.1     12    137  |  100  |  14.4  ----------------------------<  111<H>  3.9   |  27.0  |  0.48<L>    Ca    8.5<L>      12 Dec 2021 07:09  Phos  2.7       Mg     1.9         TPro  4.5<L>  /  Alb  2.0<L>  /  TBili  0.3<L>  /  DBili  0.1  /  AST  17  /  ALT  18  /  AlkPhos  87  12-12      Urinalysis Basic - ( 12 Dec 2021 05:44 )    Color: Yellow / Appearance: Clear / S.020 / pH: x  Gluc: x / Ketone: Negative  / Bili: Negative / Urobili: Negative mg/dL   Blood: x / Protein: 30 mg/dL / Nitrite: Negative   Leuk Esterase: Negative / RBC: 0-2 /HPF / WBC 0-2   Sq Epi: x / Non Sq Epi: Few / Bacteria: Few            RADIOLOGY, EKG & ADDITIONAL TESTS: Reviewed.  Colorectal Surgery Progress Note:    Patient evaluated at bedside. No acute distress. Yesterday patient tachycardic and hypotensive responding to IVF. Schuster placed for monitoring. Medicine reconsulted for help in management. CT AP done with persistent pneumoperitoneum and ascites, IR contacted for TAP and placed two drains in the RLQ and RUQ draining serous fluid, cultures sent, otherwise patient well.      Diet, NPO:   Except Medications (21 @ 20:08) [Active]    Scheduled Medications:   albuterol/ipratropium for Nebulization 3 milliLiter(s) Nebulizer every 6 hours  cefTRIAXone   IVPB 1000 milliGRAM(s) IV Intermittent every 24 hours  cholecalciferol 2000 Unit(s) Oral daily  dextrose 40% Gel 15 Gram(s) Oral once  dextrose 5% + lactated ringers. 1000 milliLiter(s) (100 mL/Hr) IV Continuous <Continuous>  dextrose 5%. 1000 milliLiter(s) (50 mL/Hr) IV Continuous <Continuous>  dextrose 5%. 1000 milliLiter(s) (100 mL/Hr) IV Continuous <Continuous>  dextrose 50% Injectable 25 Gram(s) IV Push once  dextrose 50% Injectable 12.5 Gram(s) IV Push once  dextrose 50% Injectable 25 Gram(s) IV Push once  enoxaparin Injectable 40 milliGRAM(s) SubCutaneous daily  escitalopram 5 milliGRAM(s) Oral daily  fluticasone propionate 50 MICROgram(s)/spray Nasal Spray 1 Spray(s) Both Nostrils every 12 hours  glucagon  Injectable 1 milliGRAM(s) IntraMuscular once  insulin lispro (ADMELOG) corrective regimen sliding scale   SubCutaneous every 6 hours  lactobacillus acidophilus 1 Tablet(s) Oral daily  levothyroxine Injectable 12.5 MICROGram(s) IV Push <User Schedule>  loratadine 10 milliGRAM(s) Oral daily  mesalamine DR Capsule 800 milliGRAM(s) Oral every 12 hours  polyethylene glycol 3350 17 Gram(s) Oral daily    PRN Medications:  ALBUTerol    90 MICROgram(s) HFA Inhaler 2 Puff(s) Inhalation every 6 hours PRN Shortness of Breath and/or Wheezing  aluminum hydroxide/magnesium hydroxide/simethicone Suspension 30 milliLiter(s) Oral every 4 hours PRN Dyspepsia    Vital Signs Last 24 Hrs  T(C): 36.4 (12 Dec 2021 21:02), Max: 36.8 (12 Dec 2021 12:42)  T(F): 97.5 (12 Dec 2021 21:02), Max: 98.2 (12 Dec 2021 12:42)  HR: 100 (12 Dec 2021 21:02) (86 - 102)  BP: 100/60 (12 Dec 2021 21:02) (94/61 - 102/65)  RR: 18 (12 Dec 2021 21:02) (18 - 18)  SpO2: 91% (12 Dec 2021 21:02) (91% - 99%)      Physical Exam:   GEN: patient resting comfortably in bed, in no acute distress, NGT in place and connected to suction  RESP: respirations are unlabored, no accessory muscle use, no conversational dyspnea  CVS: RRR  GI: Abdomen soft, non-tender, non-distended, no rebound tenderness / guarding; ostomy stoma pink and patent, fecal fluid material in bag, midline incision site dry. Drains in RUQ and RLQ draining serous fluid.       I&O's Detail    11 Dec 2021 07:01  -  12 Dec 2021 07:00  --------------------------------------------------------  IN:    dextrose 5% + lactated ringers: 700 mL    Free Water: 600 mL    Vital1.5: 265 mL  Total IN: 1565 mL    OUT:    Colostomy (mL): 200 mL    Indwelling Catheter - Urethral (mL): 500 mL    Nasogastric/Oral tube (mL): 150 mL  Total OUT: 850 mL    Total NET: 715 mL      12 Dec 2021 07:01  -  13 Dec 2021 02:04  --------------------------------------------------------  IN:    dextrose 5% + sodium chloride 0.9% w/ Additives: 1000 mL    IV PiggyBack: 100 mL  Total IN: 1100 mL    OUT:    Colostomy (mL): 100 mL    Drain (mL): 1000 mL    Drain (mL): 600 mL    Indwelling Catheter - Urethral (mL): 200 mL    Nasogastric/Oral tube (mL): 250 mL    Oral Fluid: 0 mL    Vital1.5: 0 mL  Total OUT: 2150 mL    Total NET: -1050 mL    LABS:                         11.7   19.29 )-----------( 453      ( 12 Dec 2021 07:09 )             36.1     12-12    137  |  100  |  14.4  ----------------------------<  111<H>  3.9   |  27.0  |  0.48<L>    Ca    8.5<L>      12 Dec 2021 07:09  Phos  2.7     12-12  Mg     1.9     12    TPro  4.5<L>  /  Alb  2.0<L>  /  TBili  0.3<L>  /  DBili  0.1  /  AST  17  /  ALT  18  /  AlkPhos  87  12-12      Urinalysis Basic - ( 12 Dec 2021 05:44 )    Color: Yellow / Appearance: Clear / S.020 / pH: x  Gluc: x / Ketone: Negative  / Bili: Negative / Urobili: Negative mg/dL   Blood: x / Protein: 30 mg/dL / Nitrite: Negative   Leuk Esterase: Negative / RBC: 0-2 /HPF / WBC 0-2   Sq Epi: x / Non Sq Epi: Few / Bacteria: Few    RADIOLOGY, EKG & ADDITIONAL TESTS: Reviewed.

## 2021-12-13 NOTE — PROGRESS NOTE ADULT - ASSESSMENT
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   Due to likelihood of recurrence, surgical intervention was recommended.   Now s/p open sigmoidectomy with ostomy creation. Patient progressing well with functioning ostomy. NG tube   Placed for nutritional support, incidentally discovered to have a pneumoperitoneum after work up.  CT imaging with IV contrast confirmed pneumoperitoneum and ascites, no evidence of perforated viscus but that assessment could not be ruled out.  She was seen by speech and swallow that recommended pureed diet, moderately thick with supplemental tube feeds.    Patient with intermittent hypotension and tachycardia now, repeat CT with worsening pneumoperitoneum, made NPO and NGT in suction now. IR placed two peritoneal drains for persistent ascities, cultures sent.      PLAN:    - Pain control MM   - Monitor ostomy o/p   - Monitor midline wound output   - NPO  - Monitor lytes &  replete PRN   - Monitor lower extremity edema and upper extremity temperatures/cyanosis  - Monitor vitals  - F/U Med recs  - FU IR drain output and cultures   - Continue with Zosyn

## 2021-12-13 NOTE — CHART NOTE - NSCHARTNOTEFT_GEN_A_CORE
Nutrition Note: Calorie count follow up. Pt now NPO- found with persistent pneumoperitoneum and ascites, IR placed two peritoneal drains for persistent ascites, cultures sent. NGT to [facility]CS. Will cancel calorie count at this time. RD to follow up per protocol.

## 2021-12-13 NOTE — CHART NOTE - NSCHARTNOTEFT_GEN_A_CORE
patient on tube feeds to goal, if tolerated will plan to DC NGT in am and restart diet   once diet restarted plan to give all home meds indicated including home dose of lasix patient on tube feeds to goal, if tolerated will plan to DC NGT in am and restart diet   once diet restarted plan to give all home meds indicated including home dose of torasemide

## 2021-12-13 NOTE — PROGRESS NOTE ADULT - SUBJECTIVE AND OBJECTIVE BOX
Pt seen and examined. Asked to re-evaluate this pt who was previously seen by us for possible TPN administration but at the time was felt to have a functional gut and enteral feeds vid Dobhoff tube was started. However she has had increasing pneumoperitoneum on cross-sectional imaging most recently 2021 and had IR directed paracentesis yesterday where roughly 100 cc. of ascitic fluid was removed and she has drainage catheters in place draining clear yellow ascitic fluid. Pt. was seen sitting up with half of her hand in her mouth and rocking. She does have Dobhoff tube in place.    REVIEW OF SYSTEMS:  Constitutional: No fever, weight loss or fatigue  Cardiovascular: No chest pain, palpitations, dizziness or leg swelling  Gastrointestinal: No abdominal or epigastric pain. No nausea, vomiting or hematemesis; No diarrhea or constipation. No melena or hematochezia.  Skin: No itching, burning, rashes or lesions       MEDICATIONS:  MEDICATIONS  (STANDING):  albuterol/ipratropium for Nebulization 3 milliLiter(s) Nebulizer every 6 hours  cholecalciferol 2000 Unit(s) Oral daily  dextrose 40% Gel 15 Gram(s) Oral once  dextrose 5% + lactated ringers. 1000 milliLiter(s) (100 mL/Hr) IV Continuous <Continuous>  dextrose 5% + sodium chloride 0.9% 1000 milliLiter(s) (100 mL/Hr) IV Continuous <Continuous>  dextrose 5%. 1000 milliLiter(s) (50 mL/Hr) IV Continuous <Continuous>  dextrose 5%. 1000 milliLiter(s) (100 mL/Hr) IV Continuous <Continuous>  dextrose 50% Injectable 25 Gram(s) IV Push once  dextrose 50% Injectable 12.5 Gram(s) IV Push once  dextrose 50% Injectable 25 Gram(s) IV Push once  enoxaparin Injectable 40 milliGRAM(s) SubCutaneous daily  escitalopram 5 milliGRAM(s) Oral daily  fluticasone propionate 50 MICROgram(s)/spray Nasal Spray 1 Spray(s) Both Nostrils every 12 hours  glucagon  Injectable 1 milliGRAM(s) IntraMuscular once  insulin lispro (ADMELOG) corrective regimen sliding scale   SubCutaneous every 6 hours  lactobacillus acidophilus 1 Tablet(s) Oral daily  levothyroxine Injectable 12.5 MICROGram(s) IV Push <User Schedule>  loratadine 10 milliGRAM(s) Oral daily  mesalamine DR Capsule 800 milliGRAM(s) Oral every 12 hours  piperacillin/tazobactam IVPB.. 3.375 Gram(s) IV Intermittent every 8 hours  polyethylene glycol 3350 17 Gram(s) Oral daily    MEDICATIONS  (PRN):  ALBUTerol    90 MICROgram(s) HFA Inhaler 2 Puff(s) Inhalation every 6 hours PRN Shortness of Breath and/or Wheezing  aluminum hydroxide/magnesium hydroxide/simethicone Suspension 30 milliLiter(s) Oral every 4 hours PRN Dyspepsia  dextrose 50% Injectable 50 milliLiter(s) IV Push once PRN Glucose <70      Allergies    No Known Allergies    Intolerances        Vital Signs Last 24 Hrs  T(C): 36.4 (13 Dec 2021 10:19), Max: 36.6 (12 Dec 2021 16:15)  T(F): 97.5 (13 Dec 2021 10:19), Max: 97.8 (12 Dec 2021 16:15)  HR: 81 (13 Dec 2021 10:19) (81 - 100)  BP: 104/68 (13 Dec 2021 10:19) (100/56 - 104/68)  BP(mean): --  RR: 18 (13 Dec 2021 10:19) (18 - 18)  SpO2: 93% (13 Dec 2021 10:19) (91% - 96%)     @ 07:  -   @ 07:00  --------------------------------------------------------  IN: 1100 mL / OUT: 4400 mL / NET: -3300 mL     @ 07:  -   @ 16:11  --------------------------------------------------------  IN: 0 mL / OUT: 350 mL / NET: -350 mL        PHYSICAL EXAM:    General: Well developed; in no acute distress  HEENT: MMM, conjunctiva and sclera clear  Lungs: clear to auscultation and percussion.  Cor: RRR S1, S2 only w/o mrg or clicks  Gastrointestinal:Abdomen: Soft non-tender non-distended; Normal bowel sounds; No hepatosplenomegaly  no surgical scars.  ANDRY: Good sphincter tone, no jenaro-anal pathology, no masses or tenderness, brown, hemoccult negative stool.  Extremities: no cyanosis, clubbing or edema.  Skin: Warm and dry. No obvious rash  Neuro: Pt. a + o x 3, no tremulousness or asterixsis    LABS:      CBC Full  -  ( 13 Dec 2021 09:33 )  WBC Count : 12.67 K/uL  RBC Count : 3.94 M/uL  Hemoglobin : 11.4 g/dL  Hematocrit : 34.8 %  Platelet Count - Automated : 453 K/uL  Mean Cell Volume : 88.3 fl  Mean Cell Hemoglobin : 28.9 pg  Mean Cell Hemoglobin Concentration : 32.8 gm/dL  Auto Neutrophil # : 10.85 K/uL  Auto Lymphocyte # : 0.63 K/uL  Auto Monocyte # : 0.94 K/uL  Auto Eosinophil # : 0.12 K/uL  Auto Basophil # : 0.02 K/uL  Auto Neutrophil % : 85.6 %  Auto Lymphocyte % : 5.0 %  Auto Monocyte % : 7.4 %  Auto Eosinophil % : 0.9 %  Auto Basophil % : 0.2 %        140  |  104  |  9.5  ----------------------------<  246<H>  3.9   |  28.0  |  0.43<L>    Ca    7.8<L>      13 Dec 2021 09:33  Phos  2.5       Mg     2.0         TPro  4.5<L>  /  Alb  1.7<L>  /  TBili  0.4  /  DBili  0.1  /  AST  17  /  ALT  17  /  AlkPhos  100      PT/INR - ( 13 Dec 2021 10:24 )   PT: 13.2 sec;   INR: 1.15 ratio               Urinalysis Basic - ( 12 Dec 2021 05:44 )    Color: Yellow / Appearance: Clear / S.020 / pH: x  Gluc: x / Ketone: Negative  / Bili: Negative / Urobili: Negative mg/dL   Blood: x / Protein: 30 mg/dL / Nitrite: Negative   Leuk Esterase: Negative / RBC: 0-2 /HPF / WBC 0-2   Sq Epi: x / Non Sq Epi: Few / Bacteria: Few                RADIOLOGY & ADDITIONAL STUDIES (The following images were personally reviewed): Pt seen and examined. Asked to re-evaluate this pt who was previously seen by us for possible TPN administration but at the time was felt to have a functional gut and enteral feeds vid Dobhoff tube was started. However she has had increasing pneumoperitoneum on cross-sectional imaging most recently 2021 and had IR directed paracentesis yesterday where roughly 100 cc. of ascitic fluid was removed and she has drainage catheters in place draining clear yellow ascitic fluid. Pt. was seen sitting up with half of her hand in her mouth and rocking. She does have Dobhoff tube in place. Two sites aspirated by IR in the RUQ and RLQ and two drains left to gravity drainage.    REVIEW OF SYSTEMS:  Unobtainable in this pt.    MEDICATIONS:  MEDICATIONS  (STANDING):  albuterol/ipratropium for Nebulization 3 milliLiter(s) Nebulizer every 6 hours  cholecalciferol 2000 Unit(s) Oral daily  dextrose 40% Gel 15 Gram(s) Oral once  dextrose 5% + lactated ringers. 1000 milliLiter(s) (100 mL/Hr) IV Continuous <Continuous>  dextrose 5% + sodium chloride 0.9% 1000 milliLiter(s) (100 mL/Hr) IV Continuous <Continuous>  dextrose 5%. 1000 milliLiter(s) (50 mL/Hr) IV Continuous <Continuous>  dextrose 5%. 1000 milliLiter(s) (100 mL/Hr) IV Continuous <Continuous>  dextrose 50% Injectable 25 Gram(s) IV Push once  dextrose 50% Injectable 12.5 Gram(s) IV Push once  dextrose 50% Injectable 25 Gram(s) IV Push once  enoxaparin Injectable 40 milliGRAM(s) SubCutaneous daily  escitalopram 5 milliGRAM(s) Oral daily  fluticasone propionate 50 MICROgram(s)/spray Nasal Spray 1 Spray(s) Both Nostrils every 12 hours  glucagon  Injectable 1 milliGRAM(s) IntraMuscular once  insulin lispro (ADMELOG) corrective regimen sliding scale   SubCutaneous every 6 hours  lactobacillus acidophilus 1 Tablet(s) Oral daily  levothyroxine Injectable 12.5 MICROGram(s) IV Push <User Schedule>  loratadine 10 milliGRAM(s) Oral daily  mesalamine DR Capsule 800 milliGRAM(s) Oral every 12 hours  piperacillin/tazobactam IVPB.. 3.375 Gram(s) IV Intermittent every 8 hours  polyethylene glycol 3350 17 Gram(s) Oral daily    MEDICATIONS  (PRN):  ALBUTerol    90 MICROgram(s) HFA Inhaler 2 Puff(s) Inhalation every 6 hours PRN Shortness of Breath and/or Wheezing  aluminum hydroxide/magnesium hydroxide/simethicone Suspension 30 milliLiter(s) Oral every 4 hours PRN Dyspepsia  dextrose 50% Injectable 50 milliLiter(s) IV Push once PRN Glucose <70      Allergies    No Known Allergies    Intolerances        Vital Signs Last 24 Hrs  T(C): 36.4 (13 Dec 2021 10:19), Max: 36.6 (12 Dec 2021 16:15)  T(F): 97.5 (13 Dec 2021 10:), Max: 97.8 (12 Dec 2021 16:15)  HR: 81 (13 Dec 2021 10:) (81 - 100)  BP: 104/68 (13 Dec 2021 10:) (100/56 - 104/68)  BP(mean): --  RR: 18 (13 Dec 2021 10:) (18 - 18)  SpO2: 93% (13 Dec 2021 10:) (91% - 96%)     @ 07:  -   @ 07:00  --------------------------------------------------------  IN: 1100 mL / OUT: 4400 mL / NET: -3300 mL     @ 07:  -   @ 16:11  --------------------------------------------------------  IN: 0 mL / OUT: 350 mL / NET: -350 mL        PHYSICAL EXAM:    General: Well developed; malnourished appearing; rocking back and forth with half of her hand in her mouth.  HEENT: MMM, conjunctiva pink and sclera anicteric.  Lungs: clear to auscultation bilaterally.  Cor: RRR S1, S2 only.  Gastrointestinal: Abdomen: Limited exam because of pt's lack of cooperation in terms of lying flat. Pt.with two drainage catheters draining clear yellow ascitic fluid.  Extremities: no cyanosis, clubbing or edema.  Skin: Warm and dry. No obvious rash  Neuro: Pt. alert but severely altered.    LABS:      CBC Full  -  ( 13 Dec 2021 09:33 )  WBC Count : 12.67 K/uL  RBC Count : 3.94 M/uL  Hemoglobin : 11.4 g/dL  Hematocrit : 34.8 %  Platelet Count - Automated : 453 K/uL  Mean Cell Volume : 88.3 fl  Mean Cell Hemoglobin : 28.9 pg  Mean Cell Hemoglobin Concentration : 32.8 gm/dL  Auto Neutrophil # : 10.85 K/uL  Auto Lymphocyte # : 0.63 K/uL  Auto Monocyte # : 0.94 K/uL  Auto Eosinophil # : 0.12 K/uL  Auto Basophil # : 0.02 K/uL  Auto Neutrophil % : 85.6 %  Auto Lymphocyte % : 5.0 %  Auto Monocyte % : 7.4 %  Auto Eosinophil % : 0.9 %  Auto Basophil % : 0.2 %        140  |  104  |  9.5  ----------------------------<  246<H>  3.9   |  28.0  |  0.43<L>    Ca    7.8<L>      13 Dec 2021 09:33  Phos  2.5     12-  Mg     2.0         TPro  4.5<L>  /  Alb  1.7<L>  /  TBili  0.4  /  DBili  0.1  /  AST  17  /  ALT  17  /  AlkPhos  100  12-13    PT/INR - ( 13 Dec 2021 10:24 )   PT: 13.2 sec;   INR: 1.15 ratio               Urinalysis Basic - ( 12 Dec 2021 05:44 )    Color: Yellow / Appearance: Clear / S.020 / pH: x  Gluc: x / Ketone: Negative  / Bili: Negative / Urobili: Negative mg/dL   Blood: x / Protein: 30 mg/dL / Nitrite: Negative   Leuk Esterase: Negative / RBC: 0-2 /HPF / WBC 0-2   Sq Epi: x / Non Sq Epi: Few / Bacteria: Few                RADIOLOGY & ADDITIONAL STUDIES (The following images were personally reviewed):

## 2021-12-13 NOTE — PROGRESS NOTE ADULT - ASSESSMENT
New onset ascites of unclear etiology possibly secondary to hypoalbuminemia or pneumoperitoneum which is increasing in size by CT scan. In any event in the face of pneumoperitoneum of unclear etiology would D/C tube feeds and place PICC line for TPN for malnutrition. Would send ascitic fluid in driange bags for cell count and differential and culture. Case and management d/w Surgical resident.

## 2021-12-13 NOTE — PROGRESS NOTE ADULT - ASSESSMENT
61 year old female with h/o severe developmental delay (MR and nonverbal at baseline), osteoporosis, hypothyroidism transferred to [facility] with sigmoid volvulus s/p status post colonoscopic decompression and rectal tube placement 11/27 and Bruce's procedure on 11/29, over the course she was having hypotension, abdominal distention and worsening leucocytosis, her feeding were held and was started on IV fluids and NG was placed, CT scan done showed Worsening of large pneumoperitoneum, she was initially give ceftriaxone that has been changed to Zosyn, blood cultures has been obtained, medicine consulted for medical Management      Plan:     Sepsis due to worsening Pneumoperitoneum:   Has NG connected with intermittent suction  will continue with gentle hydration, Repeat Blood cultures sent,   as per Primary team IR consulted for aspiration, S/P 2 RUQ/RLQ drain placement ,   follow  fluids for Cultures Continue PipTazo 3.375 gm Q 8 hours for now   UA looks ok   Daily abd exams and repeat blood cultures daily IF blood cultures yield growth (till neg)   Monitor CBC for leucocytosis.       # Sigmoid volvulus- now post op s/p status post colonoscopic decompression and rectal tube placement 11/27 and Bruce's procedure on 11/29  Management as per Primary team  Wound care      # Intellectual disability, Non verbal at baseline, Autism  - supportive care    # IBS  - mesalamine.     # depression: escitalopram once started on oral meds     # Hypothyroidism  - levothyroxine changed to IV, can change to PO when start PO       patient rodriguez Howe on phone [phone] is the NOK, Mom is requesting for update,     patient is from [facility]    Patient medication list shows she is on Torsamide 20mg daily, will hold for now as she is NPO.     Hypoglycemia noted this am - glucose x 2 given - resolved ,   continue accu check , continue D5     Nutrition - on TF     DVT prophylaxis  - on Lovenox     Schuster placed for I and O assessment

## 2021-12-13 NOTE — PROGRESS NOTE ADULT - NSPROGADDITIONALINFOA_GEN_ALL_CORE
Patient seen and examined. She is POD 14 from a sigmoidectomy with colostomy for sigmoid volvulus. Post op course complicated by post op ileus requiring NG,  poor PO intake, malnutrition and more recently large volume ascites with pneumoperitoneum of unclear etiology. Currently, ascites is attributed to malnutrition but it is quite odd since it seems to be acute in nature, no ascites present on admitting CT scan. She has 2 IR drains in place which have serous output and no growth to date. Her WBC has improved significantly to 12.6 from 19 yesterday. Her hypotension and tachycardia is likely from fluid shifts and she is getting replacement of ascites. She needs nutrition and if no ability to start PO in the next day or 2, will recommend parenteral nutrition. She has on torsemide at home, not sure why, defer to medicine for management of this. She has also been hypoglycemic, on D5 in maintenance fluids. Patient seen and examined. She is POD 14 from a sigmoidectomy with colostomy for sigmoid volvulus. Post op course complicated by post op ileus requiring NG,  poor PO intake, malnutrition and more recently large volume ascites with pneumoperitoneum of unclear etiology. Currently, ascites is attributed to malnutrition but it is quite odd since it seems to be acute in nature, no ascites present on admitting CT scan. She has 2 IR drains in place which have serous output and no growth to date. Her WBC has improved significantly to 12.6 from 19 yesterday. Her hypotension and tachycardia is likely from fluid shifts and she is getting replacement of ascites. She needs nutrition and will start tube feeds via her current NG which was placed for gastric distention on imaging. Output is currently gastric juices, non bilious. If she tolerates this, would remove NG and start PO diet. She needs to be fed and her group home would send someone to assist with this. She has on torsemide at home, not sure why, defer to medicine for management of this. She has also been hypoglycemic, on D5 in maintenance fluids.

## 2021-12-14 DIAGNOSIS — R18.8 OTHER ASCITES: ICD-10-CM

## 2021-12-14 LAB
-  AMPICILLIN: SIGNIFICANT CHANGE UP
-  TETRACYCLINE: SIGNIFICANT CHANGE UP
-  VANCOMYCIN: SIGNIFICANT CHANGE UP
ANION GAP SERPL CALC-SCNC: 7 MMOL/L — SIGNIFICANT CHANGE UP (ref 5–17)
BUN SERPL-MCNC: 7.8 MG/DL — LOW (ref 8–20)
CALCIUM SERPL-MCNC: 8.3 MG/DL — LOW (ref 8.6–10.2)
CHLORIDE SERPL-SCNC: 108 MMOL/L — HIGH (ref 98–107)
CO2 SERPL-SCNC: 26 MMOL/L — SIGNIFICANT CHANGE UP (ref 22–29)
CREAT SERPL-MCNC: 0.36 MG/DL — LOW (ref 0.5–1.3)
GLUCOSE BLDC GLUCOMTR-MCNC: 77 MG/DL — SIGNIFICANT CHANGE UP (ref 70–99)
GLUCOSE BLDC GLUCOMTR-MCNC: 89 MG/DL — SIGNIFICANT CHANGE UP (ref 70–99)
GLUCOSE BLDC GLUCOMTR-MCNC: 91 MG/DL — SIGNIFICANT CHANGE UP (ref 70–99)
GLUCOSE BLDC GLUCOMTR-MCNC: 93 MG/DL — SIGNIFICANT CHANGE UP (ref 70–99)
GLUCOSE SERPL-MCNC: 103 MG/DL — HIGH (ref 70–99)
HCT VFR BLD CALC: 35.5 % — SIGNIFICANT CHANGE UP (ref 34.5–45)
HGB BLD-MCNC: 11.5 G/DL — SIGNIFICANT CHANGE UP (ref 11.5–15.5)
MAGNESIUM SERPL-MCNC: 1.8 MG/DL — SIGNIFICANT CHANGE UP (ref 1.6–2.6)
MCHC RBC-ENTMCNC: 28.6 PG — SIGNIFICANT CHANGE UP (ref 27–34)
MCHC RBC-ENTMCNC: 32.4 GM/DL — SIGNIFICANT CHANGE UP (ref 32–36)
MCV RBC AUTO: 88.3 FL — SIGNIFICANT CHANGE UP (ref 80–100)
METHOD TYPE: SIGNIFICANT CHANGE UP
PHOSPHATE SERPL-MCNC: 2.5 MG/DL — SIGNIFICANT CHANGE UP (ref 2.4–4.7)
PLATELET # BLD AUTO: 495 K/UL — HIGH (ref 150–400)
POTASSIUM SERPL-MCNC: 4.1 MMOL/L — SIGNIFICANT CHANGE UP (ref 3.5–5.3)
POTASSIUM SERPL-SCNC: 4.1 MMOL/L — SIGNIFICANT CHANGE UP (ref 3.5–5.3)
RBC # BLD: 4.02 M/UL — SIGNIFICANT CHANGE UP (ref 3.8–5.2)
RBC # FLD: 14.9 % — HIGH (ref 10.3–14.5)
SODIUM SERPL-SCNC: 141 MMOL/L — SIGNIFICANT CHANGE UP (ref 135–145)
WBC # BLD: 11.91 K/UL — HIGH (ref 3.8–10.5)
WBC # FLD AUTO: 11.91 K/UL — HIGH (ref 3.8–10.5)

## 2021-12-14 PROCEDURE — 99232 SBSQ HOSP IP/OBS MODERATE 35: CPT

## 2021-12-14 PROCEDURE — 99223 1ST HOSP IP/OBS HIGH 75: CPT

## 2021-12-14 PROCEDURE — 99233 SBSQ HOSP IP/OBS HIGH 50: CPT

## 2021-12-14 RX ORDER — MAGNESIUM SULFATE 500 MG/ML
2 VIAL (ML) INJECTION ONCE
Refills: 0 | Status: COMPLETED | OUTPATIENT
Start: 2021-12-14 | End: 2021-12-14

## 2021-12-14 RX ORDER — SODIUM,POTASSIUM PHOSPHATES 278-250MG
1 POWDER IN PACKET (EA) ORAL EVERY 4 HOURS
Refills: 0 | Status: DISCONTINUED | OUTPATIENT
Start: 2021-12-14 | End: 2021-12-20

## 2021-12-14 RX ADMIN — Medication 3 MILLILITER(S): at 21:44

## 2021-12-14 RX ADMIN — POLYETHYLENE GLYCOL 3350 17 GRAM(S): 17 POWDER, FOR SOLUTION ORAL at 12:11

## 2021-12-14 RX ADMIN — PIPERACILLIN AND TAZOBACTAM 25 GRAM(S): 4; .5 INJECTION, POWDER, LYOPHILIZED, FOR SOLUTION INTRAVENOUS at 14:13

## 2021-12-14 RX ADMIN — Medication 2000 UNIT(S): at 12:15

## 2021-12-14 RX ADMIN — Medication 3 MILLILITER(S): at 03:04

## 2021-12-14 RX ADMIN — LORATADINE 10 MILLIGRAM(S): 10 TABLET ORAL at 12:11

## 2021-12-14 RX ADMIN — ESCITALOPRAM OXALATE 5 MILLIGRAM(S): 10 TABLET, FILM COATED ORAL at 12:12

## 2021-12-14 RX ADMIN — Medication 12.5 MICROGRAM(S): at 06:36

## 2021-12-14 RX ADMIN — Medication 1 SPRAY(S): at 05:12

## 2021-12-14 RX ADMIN — Medication 1 SPRAY(S): at 18:45

## 2021-12-14 RX ADMIN — PIPERACILLIN AND TAZOBACTAM 25 GRAM(S): 4; .5 INJECTION, POWDER, LYOPHILIZED, FOR SOLUTION INTRAVENOUS at 05:12

## 2021-12-14 RX ADMIN — Medication 1 TABLET(S): at 12:13

## 2021-12-14 RX ADMIN — Medication 25 GRAM(S): at 12:25

## 2021-12-14 RX ADMIN — ENOXAPARIN SODIUM 40 MILLIGRAM(S): 100 INJECTION SUBCUTANEOUS at 12:15

## 2021-12-14 RX ADMIN — SODIUM CHLORIDE 100 MILLILITER(S): 9 INJECTION, SOLUTION INTRAVENOUS at 21:35

## 2021-12-14 RX ADMIN — Medication 1 PACKET(S): at 12:11

## 2021-12-14 RX ADMIN — Medication 3 MILLILITER(S): at 11:49

## 2021-12-14 RX ADMIN — PIPERACILLIN AND TAZOBACTAM 25 GRAM(S): 4; .5 INJECTION, POWDER, LYOPHILIZED, FOR SOLUTION INTRAVENOUS at 21:35

## 2021-12-14 RX ADMIN — Medication 3 MILLILITER(S): at 16:19

## 2021-12-14 NOTE — PROGRESS NOTE ADULT - ATTENDING COMMENTS
I evaluated this pt. with my NP and agree with the above assessment and management plan. Would not continue enteral feeds in the face of increasing pneumoperitoneum of unknown etiology. Ascites likely secondary to this problem and not hypoalbuminemia but pt does appear to have significant P-C malnutrition and in the face of pneumoperitoneum should receive parenteral and not enteral nutrition. Would place PICC line for TPN if this recommendation is followed and re-consult GI. Thank you.

## 2021-12-14 NOTE — CONSULT NOTE ADULT - SUBJECTIVE AND OBJECTIVE BOX
[facility]  INFECTIOUS DISEASES AND INTERNAL MEDICINE at [place]  =======================================================  Jeb Bartlett MD  Diplomates American Board of Internal Medicine and Infectious Diseases  Tel: [phone]      Fax: [phone]  =======================================================      [facility]-[id number]  NANCYREYES RAMIREZ    CC: Patient is a 61y old  Female who presents with a chief complaint of Volvulus (14 Dec 2021 12:21)      61y  Female       Past Medical & Surgical Hx:  PAST MEDICAL & SURGICAL HISTORY:  Mentally disabled    Autism    Depression, major    Hypersalivation    Constipation    Nonverbal    OP (osteoporosis)    Cataract    S/P laparotomy            Social Hx:    FAMILY HISTORY:  FHx: blood disorder  abo incompatibility    Family history of retinitis pigmentosa        Allergies    No Known Allergies    Intolerances             REVIEW OF SYSTEMS:  CONSTITUTIONAL:  No Fever or chills  HEENT:  No diplopia or blurred vision.  No earache, sore throat or runny nose.  CARDIOVASCULAR:  No pressure, squeezing, strangling, tightness, heaviness or aching about the chest, neck, axilla or epigastrium.  RESPIRATORY:  No cough, shortness of breath  GASTROINTESTINAL:  No nausea, vomiting or diarrhea.  GENITOURINARY:  No dysuria, frequency or urgency. No Blood in urine  MUSCULOSKELETAL:  no joint aches, no muscle pain  SKIN:  No change in skin, hair or nails.  NEUROLOGIC:  No Headaches, seizures or weakness.  PSYCHIATRIC:  No disorder of thought or mood.  ENDOCRINE:  No heat or cold intolerance  HEMATOLOGICAL:  No easy bruising or bleeding.       Physical Exam:    GEN: NAD, pleasant  HEENT: normocephalic and atraumatic. EOMI. PERRL.  Anicteric  NECK: Supple.   LUNGS: Clear to auscultation.  HEART: Regular rate and rhythm without murmur.  ABDOMEN: Soft, nontender, and nondistended.  Positive bowel sounds.    : No CVA tenderness  EXTREMITIES: Without any edema.  MSK: No joint swelling  NEUROLOGIC: Cranial nerves II through XII are grossly intact. No Focal Deficits  PSYCHIATRIC: Appropriate affect .  SKIN: No Rash        Vitals:    T(F): 97.7 (14 Dec 2021 11:40), Max: 98.2 (13 Dec 2021 22:36)  HR: 67 (14 Dec 2021 11:40)  BP: 129/89 (14 Dec 2021 11:40)  RR: 18 (14 Dec 2021 11:40)  SpO2: 95% (14 Dec 2021 11:40) (91% - 96%)  temp max in last 48H T(F): , Max: 98.2 (12-13-21 @ 22:36)    Current Antibiotics:  piperacillin/tazobactam IVPB.. 3.375 Gram(s) IV Intermittent every 8 hours    Other medications:  albuterol/ipratropium for Nebulization 3 milliLiter(s) Nebulizer every 6 hours  cholecalciferol 2000 Unit(s) Oral daily  dextrose 40% Gel 15 Gram(s) Oral once  dextrose 5% + lactated ringers. 1000 milliLiter(s) IV Continuous <Continuous>  dextrose 5% + sodium chloride 0.9% 1000 milliLiter(s) IV Continuous <Continuous>  dextrose 5%. 1000 milliLiter(s) IV Continuous <Continuous>  dextrose 5%. 1000 milliLiter(s) IV Continuous <Continuous>  dextrose 50% Injectable 25 Gram(s) IV Push once  dextrose 50% Injectable 12.5 Gram(s) IV Push once  dextrose 50% Injectable 25 Gram(s) IV Push once  enoxaparin Injectable 40 milliGRAM(s) SubCutaneous daily  escitalopram 5 milliGRAM(s) Oral daily  fluticasone propionate 50 MICROgram(s)/spray Nasal Spray 1 Spray(s) Both Nostrils every 12 hours  glucagon  Injectable 1 milliGRAM(s) IntraMuscular once  insulin lispro (ADMELOG) corrective regimen sliding scale   SubCutaneous every 6 hours  lactobacillus acidophilus 1 Tablet(s) Oral daily  levothyroxine Injectable 12.5 MICROGram(s) IV Push <User Schedule>  loratadine 10 milliGRAM(s) Oral daily  mesalamine DR Capsule 800 milliGRAM(s) Oral every 12 hours  polyethylene glycol 3350 17 Gram(s) Oral daily  potassium phosphate / sodium phosphate Powder (PHOS-NaK) 1 Packet(s) Oral every 4 hours  torsemide 20 milliGRAM(s) Oral daily                            11.5   11.91 )-----------( 495      ( 14 Dec 2021 06:12 )             35.5     12-14    141  |  108<H>  |  7.8<L>  ----------------------------<  103<H>  4.1   |  26.0  |  0.36<L>    Ca    8.3<L>      14 Dec 2021 06:12  Phos  2.5     12-14  Mg     1.8     12-14    TPro  4.5<L>  /  Alb  1.7<L>  /  TBili  0.4  /  DBili  0.1  /  AST  17  /  ALT  17  /  AlkPhos  100  12-13    RECENT CULTURES:  12-12 @ 20:51 Abdominal Fl RUQ Abdominal fluid     Rare Enterococcus faecalis    polymorphonuclear leukocytes seen  No organisms seen  by cytocentrifuge      12-12 @ 20:48 Abdominal Fl RLQ Abdominal Fluid     No growth    polymorphonuclear leukocytes seen  No organisms seen  by cytocentrifuge      12-11 @ 14:18 .Blood Blood     No growth at 48 hours            WBC Count: 11.91 K/uL (12-14-21 @ 06:12)  WBC Count: 12.67 K/uL (12-13-21 @ 09:33)  WBC Count: 19.29 K/uL (12-12-21 @ 07:09)  WBC Count: 21.59 K/uL (12-11-21 @ 06:31)  WBC Count: 16.35 K/uL (12-10-21 @ 05:11)    Creatinine, Serum: 0.36 mg/dL (12-14-21 @ 06:12)  Creatinine, Serum: 0.43 mg/dL (12-13-21 @ 09:33)  Creatinine, Serum: 0.48 mg/dL (12-12-21 @ 07:09)  Creatinine, Serum: 0.49 mg/dL (12-11-21 @ 06:31)  Creatinine, Serum: 0.39 mg/dL (12-10-21 @ 05:11)               COVID-19 PCR: NotDetec (12-11-21 @ 01:09)  COVID-19 PCR: NotDetec (12-05-21 @ 13:25)  COVID-19 PCR: NotDetec (12-02-21 @ 16:41)  COVID-19 PCR: NotDetec (11-28-21 @ 17:45)   [facility]  INFECTIOUS DISEASES AND INTERNAL MEDICINE at [place]  =======================================================  Jeb Bartlett MD  Diplomates American Board of Internal Medicine and Infectious Diseases  Tel: [phone]      Fax: [phone]  =======================================================      N-[id number]  NANCY RAMIREZ    CC: Patient is a 61y old  Female who presents with a chief complaint of Volvulus (14 Dec 2021 12:21)      61y old female with severe developmental delay, nonverbal at baseline transferred for sigmoid volvulus s/p sigmoid decompression.   Due to likelihood of recurrence, surgical intervention was recommended.   Now s/p open sigmoidectomy with ostomy creation. Patient progressing well with functioning ostomy. NG tube   Placed for nutritional support, incidentally discovered to have a pneumoperitoneum after work up.  CT imaging with IV contrast confirmed pneumoperitoneum and ascites, no evidence of perforated viscus but that assessment could not be ruled out.  She was seen by speech and swallow that recommended pureed diet, moderately thick with supplemental tube feeds.    Patient with intermittent hypotension and tachycardia now, repeat CT with worsening pneumoperitoneum, made NPO and NGT in suction now. IR placed two peritoneal drains for persistent ascities, cultures sent.        Past Medical & Surgical Hx:  PAST MEDICAL & SURGICAL HISTORY:  Mentally disabled    Autism    Depression, major    Hypersalivation    Constipation    Nonverbal    OP (osteoporosis)    Cataract    S/P laparotomy            Social Hx:unknown    FAMILY HISTORY:  FHx: blood disorder  abo incompatibility    Family history of retinitis pigmentosa        Allergies    No Known Allergies    Intolerances             REVIEW OF SYSTEMS:  nonverbal      Physical Exam:    GEN: NAD, pleasant  HEENT: normocephalic and atraumatic. EOMI. PERRL.  Anicteric  NECK: Supple.   LUNGS: Clear to auscultation.  HEART: Regular rate and rhythm without murmur.  ABDOMEN: Soft, nontender, and nondistended.  Positive bowel sounds.  2 drains serous drainage, midline scar intact ostomy liquid stool  : No CVA tenderness  EXTREMITIES: Without any edema.  MSK: No joint swelling    SKIN: No Rash        Vitals:    T(F): 97.7 (14 Dec 2021 11:40), Max: 98.2 (13 Dec 2021 22:36)  HR: 67 (14 Dec 2021 11:40)  BP: 129/89 (14 Dec 2021 11:40)  RR: 18 (14 Dec 2021 11:40)  SpO2: 95% (14 Dec 2021 11:40) (91% - 96%)  temp max in last 48H T(F): , Max: 98.2 (12-13-21 @ 22:36)    Current Antibiotics:  piperacillin/tazobactam IVPB.. 3.375 Gram(s) IV Intermittent every 8 hours    Other medications:  albuterol/ipratropium for Nebulization 3 milliLiter(s) Nebulizer every 6 hours  cholecalciferol 2000 Unit(s) Oral daily  dextrose 40% Gel 15 Gram(s) Oral once  dextrose 5% + lactated ringers. 1000 milliLiter(s) IV Continuous <Continuous>  dextrose 5% + sodium chloride 0.9% 1000 milliLiter(s) IV Continuous <Continuous>  dextrose 5%. 1000 milliLiter(s) IV Continuous <Continuous>  dextrose 5%. 1000 milliLiter(s) IV Continuous <Continuous>  dextrose 50% Injectable 25 Gram(s) IV Push once  dextrose 50% Injectable 12.5 Gram(s) IV Push once  dextrose 50% Injectable 25 Gram(s) IV Push once  enoxaparin Injectable 40 milliGRAM(s) SubCutaneous daily  escitalopram 5 milliGRAM(s) Oral daily  fluticasone propionate 50 MICROgram(s)/spray Nasal Spray 1 Spray(s) Both Nostrils every 12 hours  glucagon  Injectable 1 milliGRAM(s) IntraMuscular once  insulin lispro (ADMELOG) corrective regimen sliding scale   SubCutaneous every 6 hours  lactobacillus acidophilus 1 Tablet(s) Oral daily  levothyroxine Injectable 12.5 MICROGram(s) IV Push <User Schedule>  loratadine 10 milliGRAM(s) Oral daily  mesalamine DR Capsule 800 milliGRAM(s) Oral every 12 hours  polyethylene glycol 3350 17 Gram(s) Oral daily  potassium phosphate / sodium phosphate Powder (PHOS-NaK) 1 Packet(s) Oral every 4 hours  torsemide 20 milliGRAM(s) Oral daily                            11.5   11.91 )-----------( 495      ( 14 Dec 2021 06:12 )             35.5     12-14    141  |  108<H>  |  7.8<L>  ----------------------------<  103<H>  4.1   |  26.0  |  0.36<L>    Ca    8.3<L>      14 Dec 2021 06:12  Phos  2.5     12-14  Mg     1.8     12-14    TPro  4.5<L>  /  Alb  1.7<L>  /  TBili  0.4  /  DBili  0.1  /  AST  17  /  ALT  17  /  AlkPhos  100  12-13    RECENT CULTURES:  12-12 @ 20:51 Abdominal Fl RUQ Abdominal fluid     Rare Enterococcus faecalis    polymorphonuclear leukocytes seen  No organisms seen  by cytocentrifuge      12-12 @ 20:48 Abdominal Fl RLQ Abdominal Fluid     No growth    polymorphonuclear leukocytes seen  No organisms seen  by cytocentrifuge      12-11 @ 14:18 .Blood Blood     No growth at 48 hours            WBC Count: 11.91 K/uL (12-14-21 @ 06:12)  WBC Count: 12.67 K/uL (12-13-21 @ 09:33)  WBC Count: 19.29 K/uL (12-12-21 @ 07:09)  WBC Count: 21.59 K/uL (12-11-21 @ 06:31)  WBC Count: 16.35 K/uL (12-10-21 @ 05:11)    Creatinine, Serum: 0.36 mg/dL (12-14-21 @ 06:12)  Creatinine, Serum: 0.43 mg/dL (12-13-21 @ 09:33)  Creatinine, Serum: 0.48 mg/dL (12-12-21 @ 07:09)  Creatinine, Serum: 0.49 mg/dL (12-11-21 @ 06:31)  Creatinine, Serum: 0.39 mg/dL (12-10-21 @ 05:11)               COVID-19 PCR: NotDetec (12-11-21 @ 01:09)  COVID-19 PCR: NotDetec (12-05-21 @ 13:25)  COVID-19 PCR: NotDetec (12-02-21 @ 16:41)  COVID-19 PCR: NotDetec (11-28-21 @ 17:45)    < from: CT Abdomen and Pelvis w/ IV Cont (12.11.21 @ 16:58) >  FINDINGS:  CHEST:  LUNGS AND LARGE AIRWAYS: The central airways are patent. The lungs are   clear aside from bibasilar atelectasis.  PLEURA: Small bilateral effusions.  VESSELS: Normal caliber aorta. Main pulmonary arteries are patent.  HEART: No cardiomegaly. No pericardial effusion.  MEDIASTINUM AND ATIYA: No adenopathy.  CHEST WALL AND LOWER NECK: Diffuse chest wall edema.    ABDOMEN AND PELVIS:  LIVER: Normal.  BILE DUCTS: Nondilated.  GALLBLADDER: Diffuse nonspecific wall edema.  SPLEEN: Normal.  PANCREAS: Normal.  ADRENALS: Normal.  KIDNEYS/URETERS: No calculi, hydronephrosis, or soft tissue attenuating   mass.    BLADDER: Small air in the lumen.  REPRODUCTIVE ORGANS: No masses.    BOWEL: The NG tube is in the stomach. Diffuse gaseous distention of the   bowel. Ostomy again noted. Rectal stump edema again noted. No definite   source of perforation.  PERITONEUM: Large ascites. Worsening of large pneumoperitoneum.  VESSELS: Normal caliber aorta.  RETROPERITONEUM/LYMPH NODES: No adenopathy.  ABDOMINAL WALL: Diffuse body wall edema.  BONES: No acute bony abnormality.    IMPRESSION:  *  Worsening of large pneumoperitoneum.      --- End of Report ---    < end of copied text >

## 2021-12-14 NOTE — PROGRESS NOTE ADULT - SUBJECTIVE AND OBJECTIVE BOX
Patient seen and examined . Found comfortable in the bed , non verbal , moaning .     CC : transfered from [facility] [place] for further mgmt of of sigmoid volvulus       MEDICATIONS  (STANDING):  albuterol/ipratropium for Nebulization 3 milliLiter(s) Nebulizer every 6 hours  cholecalciferol 2000 Unit(s) Oral daily  dextrose 40% Gel 15 Gram(s) Oral once  dextrose 5% + lactated ringers. 1000 milliLiter(s) (100 mL/Hr) IV Continuous <Continuous>  dextrose 5% + sodium chloride 0.9% 1000 milliLiter(s) (100 mL/Hr) IV Continuous <Continuous>  dextrose 5%. 1000 milliLiter(s) (100 mL/Hr) IV Continuous <Continuous>  dextrose 5%. 1000 milliLiter(s) (50 mL/Hr) IV Continuous <Continuous>  dextrose 50% Injectable 25 Gram(s) IV Push once  dextrose 50% Injectable 12.5 Gram(s) IV Push once  dextrose 50% Injectable 25 Gram(s) IV Push once  enoxaparin Injectable 40 milliGRAM(s) SubCutaneous daily  escitalopram 5 milliGRAM(s) Oral daily  fluticasone propionate 50 MICROgram(s)/spray Nasal Spray 1 Spray(s) Both Nostrils every 12 hours  glucagon  Injectable 1 milliGRAM(s) IntraMuscular once  insulin lispro (ADMELOG) corrective regimen sliding scale   SubCutaneous every 6 hours  lactobacillus acidophilus 1 Tablet(s) Oral daily  levothyroxine Injectable 12.5 MICROGram(s) IV Push <User Schedule>  loratadine 10 milliGRAM(s) Oral daily  magnesium sulfate  IVPB 2 Gram(s) IV Intermittent once  mesalamine DR Capsule 800 milliGRAM(s) Oral every 12 hours  piperacillin/tazobactam IVPB.. 3.375 Gram(s) IV Intermittent every 8 hours  polyethylene glycol 3350 17 Gram(s) Oral daily  potassium phosphate / sodium phosphate Powder (PHOS-NaK) 1 Packet(s) Oral every 4 hours  torsemide 20 milliGRAM(s) Oral daily    MEDICATIONS  (PRN):  ALBUTerol    90 MICROgram(s) HFA Inhaler 2 Puff(s) Inhalation every 6 hours PRN Shortness of Breath and/or Wheezing  aluminum hydroxide/magnesium hydroxide/simethicone Suspension 30 milliLiter(s) Oral every 4 hours PRN Dyspepsia  dextrose 50% Injectable 50 milliLiter(s) IV Push once PRN Glucose <70      LABS:                          11.5   11.91 )-----------( 495      ( 14 Dec 2021 06:12 )             35.5     12-14    141  |  108<H>  |  7.8<L>  ----------------------------<  103<H>  4.1   |  26.0  |  0.36<L>    Ca    8.3<L>      14 Dec 2021 06:12  Phos  2.5     12-14  Mg     1.8     12-14    TPro  4.5<L>  /  Alb  1.7<L>  /  TBili  0.4  /  DBili  0.1  /  AST  17  /  ALT  17  /  AlkPhos  100  12-13    PT/INR - ( 13 Dec 2021 10:24 )   PT: 13.2 sec;   INR: 1.15 ratio       Culture - Body Fluid with Gram Stain (12.12.21 @ 20:51)    Gram Stain:   polymorphonuclear leukocytes seen  No organisms seen  by cytocentrifuge    Specimen Source: Abdominal Fl RUQ Abdominal fluid    Culture Results:   Rare Enterococcus faecalis    Culture - Body Fluid with Gram Stain (12.12.21 @ 20:48)    Gram Stain:   polymorphonuclear leukocytes seen  No organisms seen  by cytocentrifuge    Specimen Source: Abdominal Fl RLQ Abdominal Fluid    Culture Results:   No growth    Culture - Blood (12.11.21 @ 14:18)    Specimen Source: .Blood Blood    Culture Results:   No growth at 48 hours    RADIOLOGY & ADDITIONAL TESTS:    < from: CT Abdomen and Pelvis w/ IV Cont (12.11.21 @ 16:58) >    ACC: [id number] EXAM:  CT ABDOMEN AND PELVIS IC                        ACC: [id number] EXAM:  CT CHEST IC                          PROCEDURE DATE:  12/11/2021      < end of copied text >  < from: CT Abdomen and Pelvis w/ IV Cont (12.11.21 @ 16:58) >    COMPARISON: ChestCT 12/10/2021, CT abdomen pelvis 12/7/2020    CONTRAST/COMPLICATIONS:  IV Contrast: Omnipaque 300   94 cc administered   6 cc discarded  Oral Contrast: NONE  Complications: None reported at time of study completion    PROCEDURE:  CT of the Chest, Abdomen and Pelvis was performed.  Sagittal and coronal reformats were performed.    FINDINGS:  CHEST:  LUNGS AND LARGE AIRWAYS: The central airways are patent. The lungs are   clear aside from bibasilar atelectasis.  PLEURA: Small bilateral effusions.  VESSELS: Normal caliber aorta. Main pulmonary arteries are patent.  HEART: No cardiomegaly. No pericardial effusion.  MEDIASTINUM AND ATIYA: No adenopathy.  CHEST WALL AND LOWER NECK: Diffuse chest wall edema.    ABDOMEN AND PELVIS:  LIVER: Normal.  BILE DUCTS: Nondilated.  GALLBLADDER: Diffuse nonspecific wall edema.  SPLEEN: Normal.  PANCREAS: Normal.  ADRENALS: Normal.  KIDNEYS/URETERS: No calculi, hydronephrosis, or soft tissue attenuating   mass.    BLADDER: Small air in the lumen.  REPRODUCTIVE ORGANS: No masses.    BOWEL: The NG tube is in the stomach. Diffuse gaseous distention of the   bowel. Ostomy again noted. Rectal stump edema again noted. No definite   source of perforation.  PERITONEUM: Large ascites. Worsening of large pneumoperitoneum.  VESSELS: Normal caliber aorta.  RETROPERITONEUM/LYMPH NODES: No adenopathy.  ABDOMINAL WALL: Diffuse body wall edema.  BONES: No acute bony abnormality.    IMPRESSION:  *  Worsening of large pneumoperitoneum.      --- End of Report ---    < end of copied text >    I&O's Summary    13 Dec 2021 07:01  -  14 Dec 2021 07:00  --------------------------------------------------------  IN: 0 mL / OUT: 2150 mL / NET: -2150 mL    14 Dec 2021 07:01  -  14 Dec 2021 12:31  --------------------------------------------------------  IN: 0 mL / OUT: 500 mL / NET: -500 mL            REVIEW OF SYSTEMS:    UTO , patient is non verbal     Vital Signs Last 24 Hrs  T(C): 36.5 (14 Dec 2021 11:40), Max: 36.8 (13 Dec 2021 22:36)  T(F): 97.7 (14 Dec 2021 11:40), Max: 98.2 (13 Dec 2021 22:36)  HR: 67 (14 Dec 2021 11:40) (55 - 113)  BP: 129/89 (14 Dec 2021 11:40) (99/64 - 129/89)  BP(mean): --  RR: 18 (14 Dec 2021 11:40) (18 - 18)  SpO2: 95% (14 Dec 2021 11:40) (91% - 96%)    PHYSICAL EXAM:    GENERAL: NAD, well-groomed, well-developed  HEAD:  Atraumatic, Normocephalic  EYES: EOMI, PERRLA, conjunctiva and sclera clear  NECK: Supple, No JVD, Normal thyroid  NERVOUS SYSTEM:  Alert & Oriented X3, no focal deficit  CHEST/LUNG: CTA b/l ,  no  rales, rhonchi, wheezing, or rubs  HEART: Regular rate and rhythm; No murmurs, rubs, or gallops  ABDOMEN: Soft, Nontender, Nondistended; Bowel sounds present  but diminished   EXTREMITIES:  2+ Peripheral Pulses, No clubbing, cyanosis, or edema  LYMPH: No lymphadenopathy noted  SKIN: No rashes or lesions   Patient seen and examined . Found comfortable in the bed , non verbal , moaning .     CC : transfered from [facility] [place] further mgmt of of sigmoid volvulus       MEDICATIONS  (STANDING):  albuterol/ipratropium for Nebulization 3 milliLiter(s) Nebulizer every 6 hours  cholecalciferol 2000 Unit(s) Oral daily  dextrose 40% Gel 15 Gram(s) Oral once  dextrose 5% + lactated ringers. 1000 milliLiter(s) (100 mL/Hr) IV Continuous <Continuous>  dextrose 5% + sodium chloride 0.9% 1000 milliLiter(s) (100 mL/Hr) IV Continuous <Continuous>  dextrose 5%. 1000 milliLiter(s) (100 mL/Hr) IV Continuous <Continuous>  dextrose 5%. 1000 milliLiter(s) (50 mL/Hr) IV Continuous <Continuous>  dextrose 50% Injectable 25 Gram(s) IV Push once  dextrose 50% Injectable 12.5 Gram(s) IV Push once  dextrose 50% Injectable 25 Gram(s) IV Push once  enoxaparin Injectable 40 milliGRAM(s) SubCutaneous daily  escitalopram 5 milliGRAM(s) Oral daily  fluticasone propionate 50 MICROgram(s)/spray Nasal Spray 1 Spray(s) Both Nostrils every 12 hours  glucagon  Injectable 1 milliGRAM(s) IntraMuscular once  insulin lispro (ADMELOG) corrective regimen sliding scale   SubCutaneous every 6 hours  lactobacillus acidophilus 1 Tablet(s) Oral daily  levothyroxine Injectable 12.5 MICROGram(s) IV Push <User Schedule>  loratadine 10 milliGRAM(s) Oral daily  magnesium sulfate  IVPB 2 Gram(s) IV Intermittent once  mesalamine DR Capsule 800 milliGRAM(s) Oral every 12 hours  piperacillin/tazobactam IVPB.. 3.375 Gram(s) IV Intermittent every 8 hours  polyethylene glycol 3350 17 Gram(s) Oral daily  potassium phosphate / sodium phosphate Powder (PHOS-NaK) 1 Packet(s) Oral every 4 hours  torsemide 20 milliGRAM(s) Oral daily    MEDICATIONS  (PRN):  ALBUTerol    90 MICROgram(s) HFA Inhaler 2 Puff(s) Inhalation every 6 hours PRN Shortness of Breath and/or Wheezing  aluminum hydroxide/magnesium hydroxide/simethicone Suspension 30 milliLiter(s) Oral every 4 hours PRN Dyspepsia  dextrose 50% Injectable 50 milliLiter(s) IV Push once PRN Glucose <70      LABS:                          11.5   11.91 )-----------( 495      ( 14 Dec 2021 06:12 )             35.5     12-14    141  |  108<H>  |  7.8<L>  ----------------------------<  103<H>  4.1   |  26.0  |  0.36<L>    Ca    8.3<L>      14 Dec 2021 06:12  Phos  2.5     12-14  Mg     1.8     12-14    TPro  4.5<L>  /  Alb  1.7<L>  /  TBili  0.4  /  DBili  0.1  /  AST  17  /  ALT  17  /  AlkPhos  100  12-13    PT/INR - ( 13 Dec 2021 10:24 )   PT: 13.2 sec;   INR: 1.15 ratio       Culture - Body Fluid with Gram Stain (12.12.21 @ 20:51)    Gram Stain:   polymorphonuclear leukocytes seen  No organisms seen  by cytocentrifuge    Specimen Source: Abdominal Fl RUQ Abdominal fluid    Culture Results:   Rare Enterococcus faecalis    Culture - Body Fluid with Gram Stain (12.12.21 @ 20:48)    Gram Stain:   polymorphonuclear leukocytes seen  No organisms seen  by cytocentrifuge    Specimen Source: Abdominal Fl RLQ Abdominal Fluid    Culture Results:   No growth    Culture - Blood (12.11.21 @ 14:18)    Specimen Source: .Blood Blood    Culture Results:   No growth at 48 hours    RADIOLOGY & ADDITIONAL TESTS:    < from: CT Abdomen and Pelvis w/ IV Cont (12.11.21 @ 16:58) >    ACC: [id number] EXAM:  CT ABDOMEN AND PELVIS IC                        ACC: [id number] EXAM:  CT CHEST IC                          PROCEDURE DATE:  12/11/2021      < end of copied text >  < from: CT Abdomen and Pelvis w/ IV Cont (12.11.21 @ 16:58) >    COMPARISON: ChestCT 12/10/2021, CT abdomen pelvis 12/7/2020    CONTRAST/COMPLICATIONS:  IV Contrast: Omnipaque 300   94 cc administered   6 cc discarded  Oral Contrast: NONE  Complications: None reported at time of study completion    PROCEDURE:  CT of the Chest, Abdomen and Pelvis was performed.  Sagittal and coronal reformats were performed.    FINDINGS:  CHEST:  LUNGS AND LARGE AIRWAYS: The central airways are patent. The lungs are   clear aside from bibasilar atelectasis.  PLEURA: Small bilateral effusions.  VESSELS: Normal caliber aorta. Main pulmonary arteries are patent.  HEART: No cardiomegaly. No pericardial effusion.  MEDIASTINUM AND ATIYA: No adenopathy.  CHEST WALL AND LOWER NECK: Diffuse chest wall edema.    ABDOMEN AND PELVIS:  LIVER: Normal.  BILE DUCTS: Nondilated.  GALLBLADDER: Diffuse nonspecific wall edema.  SPLEEN: Normal.  PANCREAS: Normal.  ADRENALS: Normal.  KIDNEYS/URETERS: No calculi, hydronephrosis, or soft tissue attenuating   mass.    BLADDER: Small air in the lumen.  REPRODUCTIVE ORGANS: No masses.    BOWEL: The NG tube is in the stomach. Diffuse gaseous distention of the   bowel. Ostomy again noted. Rectal stump edema again noted. No definite   source of perforation.  PERITONEUM: Large ascites. Worsening of large pneumoperitoneum.  VESSELS: Normal caliber aorta.  RETROPERITONEUM/LYMPH NODES: No adenopathy.  ABDOMINAL WALL: Diffuse body wall edema.  BONES: No acute bony abnormality.    IMPRESSION:  *  Worsening of large pneumoperitoneum.      --- End of Report ---    < end of copied text >    I&O's Summary    13 Dec 2021 07:01  -  14 Dec 2021 07:00  --------------------------------------------------------  IN: 0 mL / OUT: 2150 mL / NET: -2150 mL    14 Dec 2021 07:01  -  14 Dec 2021 12:31  --------------------------------------------------------  IN: 0 mL / OUT: 500 mL / NET: -500 mL            REVIEW OF SYSTEMS:    UTO , patient is non verbal     Vital Signs Last 24 Hrs  T(C): 36.5 (14 Dec 2021 11:40), Max: 36.8 (13 Dec 2021 22:36)  T(F): 97.7 (14 Dec 2021 11:40), Max: 98.2 (13 Dec 2021 22:36)  HR: 67 (14 Dec 2021 11:40) (55 - 113)  BP: 129/89 (14 Dec 2021 11:40) (99/64 - 129/89)  BP(mean): --  RR: 18 (14 Dec 2021 11:40) (18 - 18)  SpO2: 95% (14 Dec 2021 11:40) (91% - 96%)    PHYSICAL EXAM:    GENERAL: NAD, well-groomed, well-developed  NGT +   HEAD:  Atraumatic, Normocephalic  EYES: EOMI, PERRLA, conjunctiva and sclera clear  NECK: Supple, No JVD, Normal thyroid  NERVOUS SYSTEM:  Alert & Oriented X3, no focal deficit  CHEST/LUNG: CTA b/l ,  no  rales, rhonchi, wheezing, or rubs  HEART: Regular rate and rhythm; No murmurs, rubs, or gallops  ABDOMEN: Soft, Nontender, Nondistended; Bowel sounds present  but diminished , RUQ/RLQ drain +   EXTREMITIES:  2+ Peripheral Pulses, No clubbing, cyanosis, or edema  LYMPH: No lymphadenopathy noted  SKIN: No rashes or lesions  [place] +

## 2021-12-14 NOTE — PROGRESS NOTE ADULT - SUBJECTIVE AND OBJECTIVE BOX
Acute Care Surgery/Trauma Surgery Progress Note:    Patient with one hypotensive episode that responded well with LR bolus.  Nonverbal per usual, but patient kept trying to place her Left fist into her mouth.  When instructed not to do that, patient moaned as if she understood.    Patient's ostomy site continues to be pink and patent with fecal fluid output.  Her two R abdominal drains without clear, ascitic fluid.  No other acute overnight events. Patient afebrile, VSS. Pain well controlled. Tolerating tube feed diet. No n/v/f/c/cp/sob.     Diet, NPO with Tube Feed:   Tube Feeding Modality: Nasogastric  Pivot 1.5 [place] (PIVOT1.5RTH)  Total Volume for 24 Hours (mL): 960  Continuous  Starting Tube Feed Rate mL per Hour: 20  Increase Tube Feed Rate by (mL): 10     Every 2 hours  Until Goal Tube Feed Rate (mL per Hour): 40  Tube Feed Duration (in Hours): 24  Tube Feed Start Time: 14:40 (21 @ 14:37)      Scheduled Medications:   albuterol/ipratropium for Nebulization 3 milliLiter(s) Nebulizer every 6 hours  cholecalciferol 2000 Unit(s) Oral daily  dextrose 40% Gel 15 Gram(s) Oral once  dextrose 5% + lactated ringers. 1000 milliLiter(s) (100 mL/Hr) IV Continuous <Continuous>  dextrose 5% + sodium chloride 0.9% 1000 milliLiter(s) (100 mL/Hr) IV Continuous <Continuous>  dextrose 5%. 1000 milliLiter(s) (100 mL/Hr) IV Continuous <Continuous>  dextrose 5%. 1000 milliLiter(s) (50 mL/Hr) IV Continuous <Continuous>  dextrose 50% Injectable 12.5 Gram(s) IV Push once  dextrose 50% Injectable 25 Gram(s) IV Push once  dextrose 50% Injectable 25 Gram(s) IV Push once  enoxaparin Injectable 40 milliGRAM(s) SubCutaneous daily  escitalopram 5 milliGRAM(s) Oral daily  fluticasone propionate 50 MICROgram(s)/spray Nasal Spray 1 Spray(s) Both Nostrils every 12 hours  glucagon  Injectable 1 milliGRAM(s) IntraMuscular once  insulin lispro (ADMELOG) corrective regimen sliding scale   SubCutaneous every 6 hours  lactobacillus acidophilus 1 Tablet(s) Oral daily  levothyroxine Injectable 12.5 MICROGram(s) IV Push <User Schedule>  loratadine 10 milliGRAM(s) Oral daily  mesalamine DR Capsule 800 milliGRAM(s) Oral every 12 hours  piperacillin/tazobactam IVPB.. 3.375 Gram(s) IV Intermittent every 8 hours  polyethylene glycol 3350 17 Gram(s) Oral daily  torsemide 20 milliGRAM(s) Oral daily    PRN Medications:  ALBUTerol    90 MICROgram(s) HFA Inhaler 2 Puff(s) Inhalation every 6 hours PRN Shortness of Breath and/or Wheezing  aluminum hydroxide/magnesium hydroxide/simethicone Suspension 30 milliLiter(s) Oral every 4 hours PRN Dyspepsia  dextrose 50% Injectable 50 milliLiter(s) IV Push once PRN Glucose <70      Objective:   T(F): 97.4 ( @ 23:25), Max: 98.2 ( @ 22:36)  HR: 78 ( @ 03:08) (66 - 113)  BP: 99/64 ( @ 23:25) (99/64 - 104/68)  BP(mean): --  ABP: --  ABP(mean): --  RR: 18 ( @ 22:36) (18 - 18)  SpO2: 96% ( @ 03:08) (92% - 96%)      Physical Exam:   GEN: patient resting comfortably in bed, in no acute distress  RESP: respirations are unlabored, no accessory muscle use  CVS: RRR  GI: Abdomen soft, non-tender, non-distended, no rebound tenderness / guarding, ostomy to Left hemiabdomen, Drains to Right abdomenx2 with clear ascitic fluid    I&O's     @ 07:01  -   @ 07:00  --------------------------------------------------------  IN:    dextrose 5% + sodium chloride 0.9% w/ Additives: 1000 mL    IV PiggyBack: 100 mL  Total IN: 1100 mL    OUT:    Colostomy (mL): 200 mL    Drain (mL): 1450 mL    Drain (mL): 1250 mL    Indwelling Catheter - Urethral (mL): 1050 mL    Nasogastric/Oral tube (mL): 450 mL    Oral Fluid: 0 mL    Vital1.5: 0 mL  Total OUT: 4400 mL    Total NET: -3300 mL       @ 07:01  -   @ 04:30  --------------------------------------------------------  IN:  Total IN: 0 mL    OUT:    Drain (mL): 350 mL  Total OUT: 350 mL    Total NET: -350 mL          LABS:                        11.4   12.67 )-----------( 453      ( 13 Dec 2021 09:33 )             34.8         140  |  104  |  9.5  ----------------------------<  246<H>  3.9   |  28.0  |  0.43<L>    Ca    7.8<L>      13 Dec 2021 09:33  Phos  2.5       Mg     2.0         TPro  4.5<L>  /  Alb  1.7<L>  /  TBili  0.4  /  DBili  0.1  /  AST  17  /  ALT  17  /  AlkPhos  100      PT/INR - ( 13 Dec 2021 10:24 )   PT: 13.2 sec;   INR: 1.15 ratio           Urinalysis Basic - ( 12 Dec 2021 05:44 )    Color: Yellow / Appearance: Clear / S.020 / pH: x  Gluc: x / Ketone: Negative  / Bili: Negative / Urobili: Negative mg/dL   Blood: x / Protein: 30 mg/dL / Nitrite: Negative   Leuk Esterase: Negative / RBC: 0-2 /HPF / WBC 0-2   Sq Epi: x / Non Sq Epi: Few / Bacteria: Few        MICROBIOLOGY:     Culture - Body Fluid with Gram Stain (collected  @ 20:51)  Source: Abdominal Fl RUQ Abdominal fluid  Gram Stain ( @ 23:28):    polymorphonuclear leukocytes seen    No organisms seen    by cytocentrifuge  Preliminary Report ( @ 19:24):    Rare Enterococcus faecalis    Culture - Body Fluid with Gram Stain (collected  @ 20:48)  Source: Abdominal Fl RLQ Abdominal Fluid  Gram Stain ( @ 23:27):    polymorphonuclear leukocytes seen    No organisms seen    by cytocentrifuge  Preliminary Report ( @ 17:59):    No growth    Culture - Blood (collected  @ 14:18)  Source: .Blood Blood  Preliminary Report ( @ 15:01):    No growth at 48 hours        PATHOLOGY:       Acute Care Surgery/Trauma Surgery Progress Note:    Patient with one hypotensive episode that responded well with LR bolus.  Nonverbal per usual, but patient kept trying to place her Left fist into her mouth.  When instructed not to do that, patient moaned as if she understood.    Patient's ostomy site continues to be pink and patent with fecal fluid output.  Her two R abdominal drains with clear, ascitic fluid.  No other acute overnight events. Patient afebrile, VSS. Pain well controlled. Tolerating tube feed diet. No n/v/f/c/cp/sob.     Diet, NPO with Tube Feed:   Tube Feeding Modality: Nasogastric  Pivot 1.5 [place] (PIVOT1.5RTH)  Total Volume for 24 Hours (mL): 960  Continuous  Starting Tube Feed Rate mL per Hour: 20  Increase Tube Feed Rate by (mL): 10     Every 2 hours  Until Goal Tube Feed Rate (mL per Hour): 40  Tube Feed Duration (in Hours): 24  Tube Feed Start Time: 14:40 (21 @ 14:37)      Scheduled Medications:   albuterol/ipratropium for Nebulization 3 milliLiter(s) Nebulizer every 6 hours  cholecalciferol 2000 Unit(s) Oral daily  dextrose 40% Gel 15 Gram(s) Oral once  dextrose 5% + lactated ringers. 1000 milliLiter(s) (100 mL/Hr) IV Continuous <Continuous>  dextrose 5% + sodium chloride 0.9% 1000 milliLiter(s) (100 mL/Hr) IV Continuous <Continuous>  dextrose 5%. 1000 milliLiter(s) (100 mL/Hr) IV Continuous <Continuous>  dextrose 5%. 1000 milliLiter(s) (50 mL/Hr) IV Continuous <Continuous>  dextrose 50% Injectable 12.5 Gram(s) IV Push once  dextrose 50% Injectable 25 Gram(s) IV Push once  dextrose 50% Injectable 25 Gram(s) IV Push once  enoxaparin Injectable 40 milliGRAM(s) SubCutaneous daily  escitalopram 5 milliGRAM(s) Oral daily  fluticasone propionate 50 MICROgram(s)/spray Nasal Spray 1 Spray(s) Both Nostrils every 12 hours  glucagon  Injectable 1 milliGRAM(s) IntraMuscular once  insulin lispro (ADMELOG) corrective regimen sliding scale   SubCutaneous every 6 hours  lactobacillus acidophilus 1 Tablet(s) Oral daily  levothyroxine Injectable 12.5 MICROGram(s) IV Push <User Schedule>  loratadine 10 milliGRAM(s) Oral daily  mesalamine DR Capsule 800 milliGRAM(s) Oral every 12 hours  piperacillin/tazobactam IVPB.. 3.375 Gram(s) IV Intermittent every 8 hours  polyethylene glycol 3350 17 Gram(s) Oral daily  torsemide 20 milliGRAM(s) Oral daily    PRN Medications:  ALBUTerol    90 MICROgram(s) HFA Inhaler 2 Puff(s) Inhalation every 6 hours PRN Shortness of Breath and/or Wheezing  aluminum hydroxide/magnesium hydroxide/simethicone Suspension 30 milliLiter(s) Oral every 4 hours PRN Dyspepsia  dextrose 50% Injectable 50 milliLiter(s) IV Push once PRN Glucose <70      Objective:   T(F): 97.4 ( @ 23:25), Max: 98.2 ( @ 22:36)  HR: 78 ( @ 03:08) (66 - 113)  BP: 99/64 ( @ 23:25) (99/64 - 104/68)  BP(mean): --  ABP: --  ABP(mean): --  RR: 18 ( @ 22:36) (18 - 18)  SpO2: 96% ( @ 03:08) (92% - 96%)      Physical Exam:   GEN: patient resting comfortably in bed, in no acute distress  RESP: respirations are unlabored, no accessory muscle use  CVS: RRR  GI: Abdomen soft, non-tender, non-distended, no rebound tenderness / guarding, ostomy to Left hemiabdomen, Drains to Right abdomenx2 with clear ascitic fluid    I&O's     @ 07:01  -   @ 07:00  --------------------------------------------------------  IN:    dextrose 5% + sodium chloride 0.9% w/ Additives: 1000 mL    IV PiggyBack: 100 mL  Total IN: 1100 mL    OUT:    Colostomy (mL): 200 mL    Drain (mL): 1450 mL    Drain (mL): 1250 mL    Indwelling Catheter - Urethral (mL): 1050 mL    Nasogastric/Oral tube (mL): 450 mL    Oral Fluid: 0 mL    Vital1.5: 0 mL  Total OUT: 4400 mL    Total NET: -3300 mL       @ 07:01  -   @ 04:30  --------------------------------------------------------  IN:  Total IN: 0 mL    OUT:    Drain (mL): 350 mL  Total OUT: 350 mL    Total NET: -350 mL          LABS:                        11.4   12.67 )-----------( 453      ( 13 Dec 2021 09:33 )             34.8         140  |  104  |  9.5  ----------------------------<  246<H>  3.9   |  28.0  |  0.43<L>    Ca    7.8<L>      13 Dec 2021 09:33  Phos  2.5       Mg     2.0         TPro  4.5<L>  /  Alb  1.7<L>  /  TBili  0.4  /  DBili  0.1  /  AST  17  /  ALT  17  /  AlkPhos  100      PT/INR - ( 13 Dec 2021 10:24 )   PT: 13.2 sec;   INR: 1.15 ratio           Urinalysis Basic - ( 12 Dec 2021 05:44 )    Color: Yellow / Appearance: Clear / S.020 / pH: x  Gluc: x / Ketone: Negative  / Bili: Negative / Urobili: Negative mg/dL   Blood: x / Protein: 30 mg/dL / Nitrite: Negative   Leuk Esterase: Negative / RBC: 0-2 /HPF / WBC 0-2   Sq Epi: x / Non Sq Epi: Few / Bacteria: Few        MICROBIOLOGY:     Culture - Body Fluid with Gram Stain (collected  @ 20:51)  Source: Abdominal Fl RUQ Abdominal fluid  Gram Stain ( @ 23:28):    polymorphonuclear leukocytes seen    No organisms seen    by cytocentrifuge  Preliminary Report ( @ 19:24):    Rare Enterococcus faecalis    Culture - Body Fluid with Gram Stain (collected  @ 20:48)  Source: Abdominal Fl RLQ Abdominal Fluid  Gram Stain ( @ 23:27):    polymorphonuclear leukocytes seen    No organisms seen    by cytocentrifuge  Preliminary Report ( @ 17:59):    No growth    Culture - Blood (collected  @ 14:18)  Source: .Blood Blood  Preliminary Report ( @ 15:01):    No growth at 48 hours        PATHOLOGY:

## 2021-12-14 NOTE — PROGRESS NOTE ADULT - ASSESSMENT
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   Due to likelihood of recurrence, surgical intervention was recommended.   Now s/p open sigmoidectomy with ostomy creation. Patient progressing well with functioning ostomy. NG tube   Placed for nutritional support, incidentally discovered to have a pneumoperitoneum after work up.  CT imaging with IV contrast confirmed pneumoperitoneum and ascites, no evidence of perforated viscus but that assessment could not be ruled out.  She was seen by speech and swallow that recommended pureed diet, moderately thick with supplemental tube feeds.    Patient with intermittent hypotension and tachycardia now, repeat CT with worsening pneumoperitoneum, made NPO and NGT in suction now. IR placed two peritoneal drains for persistent ascities, cultures sent.      PLAN:    - Pain control MM   - Monitor ostomy o/p   - Monitor drainx2  output   - NPO/Tube feeds  - Monitor lytes &  replete PRN   - Monitor lower extremity edema and upper extremity temperatures/cyanosis  - Monitor vitals and bolus prn secondary to ascitic output  - F/U Med recs  - FU IR drain cultures   - Continue with Zosyn

## 2021-12-14 NOTE — PROGRESS NOTE ADULT - ASSESSMENT
61 year old female with h/o severe developmental delay (MR and nonverbal at baseline), osteoporosis, hypothyroidism transferred to [facility] with sigmoid volvulus s/p status post colonoscopic decompression and rectal tube placement 11/27 and Bruce's procedure on 11/29, over the course she was having hypotension, abdominal distention and worsening leucocytosis, her feeding were held and was started on IV fluids and NG was placed, CT scan done showed Worsening of large pneumoperitoneum, she was initially give ceftriaxone that has been changed to Zosyn, blood cultures has been obtained, medicine consulted for medical Management      Plan:     Sepsis due to worsening Pneumoperitoneum:   Has NG connected with intermittent suction  will continue with gentle hydration, Repeat Blood cultures sent,   as per Primary team IR consulted ,  S/P 2 RUQ/RLQ drain placement on 12/12   with ascitic fluid drainage   follow  fluids for Cultures Continue PipTazo 3.375 gm Q 8 hours for now   Daily abd exams  Worsening of pneumoperitoneum on CT noted -   GI following - recommending NPO / TPN   Fluid cultures with rare Enterococcus faecalis + ,   follow sensitivity        # Sigmoid volvulus-  status post colonoscopic decompression and rectal tube placement 11/27 and Bruce's procedure on 11/29  Management as per Primary team  Wound care      # Intellectual disability, Non verbal at baseline, Autism  - supportive care    # IBS  - mesalamine.     # depression: escitalopram once started on oral meds     # Hypothyroidism  - levothyroxine changed to IV, can change to PO when tolerating  PO     patient is from [facility]'s group home    Patient medication list shows she is on Torsamide 20mg daily, will hold for now as she is NPO.     Hypoglycemia noted on 12/13  - glucose x 2 given - resolved ,   continue accu check , continue D5     Nutrition - on TF - GI recommending NPO /TPN -   as per primary team     DVT prophylaxis  - on Lovenox     Schuster placed for I and O assessment

## 2021-12-14 NOTE — PROGRESS NOTE ADULT - PROBLEM SELECTOR PLAN 1
Ascites of unclear etiology possibly secondary to pneumoperitoneum which is worsening as per last CT scan results.  in light of worsening of pneumoperitoneum of unclear etiology I will recommend discontinuation of tube feeds and PICC line placement for TPN.   -Continue to trend CBC, CMP.   -If PICC placed and TPN initiation needed please reconsult GI, otherwise no further GI interventions at this time. Will sign off.

## 2021-12-14 NOTE — PROGRESS NOTE ADULT - ATTENDING COMMENTS
Patient tolerating trickle tube feeds overnight, no obvious hiccupping or increased pain; colostomy continues to function well.  WBC improving.  Once plan for feeding by group home staff can be arranged, NGT can be removed and oral feeds initiated.  Follow up studies of ascites fluid.  Replace fluids as needed.  Medicine following, appreciate recommendations.

## 2021-12-14 NOTE — PROGRESS NOTE ADULT - SUBJECTIVE AND OBJECTIVE BOX
Chief Complaint:  Patient is a 61y old  Female who presents with a chief complaint of Volvulus. Being seen as a follow up for ascites and possible TPN.       Interval Events / Subjective:  Patient with increasing pneumoperitoneum on cross-sectional imaging most recently 12/11/2021 and had IR directed paracentesis yesterday where roughly 100 cc. of ascitic fluid was removed with drainagecatheters in place draining clear yellow ascitic fluid. Patient seen and evaluated at bedside, reporting no complaints, no overnight events.       REVIEW OF SYSTEMS:   General: Negative  HEENT: Negative  CV: Negative  Respiratory: Negative  GI: See HPI  : Negative  MSK: Negative  Hematologic: Negative  Skin: Negative    MEDICATIONS:   MEDICATIONS  (STANDING):  albuterol/ipratropium for Nebulization 3 milliLiter(s) Nebulizer every 6 hours  cholecalciferol 2000 Unit(s) Oral daily  dextrose 40% Gel 15 Gram(s) Oral once  dextrose 5% + lactated ringers. 1000 milliLiter(s) (100 mL/Hr) IV Continuous <Continuous>  dextrose 5% + sodium chloride 0.9% 1000 milliLiter(s) (100 mL/Hr) IV Continuous <Continuous>  dextrose 5%. 1000 milliLiter(s) (50 mL/Hr) IV Continuous <Continuous>  dextrose 5%. 1000 milliLiter(s) (100 mL/Hr) IV Continuous <Continuous>  dextrose 50% Injectable 25 Gram(s) IV Push once  dextrose 50% Injectable 12.5 Gram(s) IV Push once  dextrose 50% Injectable 25 Gram(s) IV Push once  enoxaparin Injectable 40 milliGRAM(s) SubCutaneous daily  escitalopram 5 milliGRAM(s) Oral daily  fluticasone propionate 50 MICROgram(s)/spray Nasal Spray 1 Spray(s) Both Nostrils every 12 hours  glucagon  Injectable 1 milliGRAM(s) IntraMuscular once  insulin lispro (ADMELOG) corrective regimen sliding scale   SubCutaneous every 6 hours  lactobacillus acidophilus 1 Tablet(s) Oral daily  levothyroxine Injectable 12.5 MICROGram(s) IV Push <User Schedule>  loratadine 10 milliGRAM(s) Oral daily  magnesium sulfate  IVPB 2 Gram(s) IV Intermittent once  mesalamine DR Capsule 800 milliGRAM(s) Oral every 12 hours  piperacillin/tazobactam IVPB.. 3.375 Gram(s) IV Intermittent every 8 hours  polyethylene glycol 3350 17 Gram(s) Oral daily  potassium phosphate / sodium phosphate Powder (PHOS-NaK) 1 Packet(s) Oral every 4 hours  torsemide 20 milliGRAM(s) Oral daily    MEDICATIONS  (PRN):  ALBUTerol    90 MICROgram(s) HFA Inhaler 2 Puff(s) Inhalation every 6 hours PRN Shortness of Breath and/or Wheezing  aluminum hydroxide/magnesium hydroxide/simethicone Suspension 30 milliLiter(s) Oral every 4 hours PRN Dyspepsia  dextrose 50% Injectable 50 milliLiter(s) IV Push once PRN Glucose <70      ALLERGIES:   Allergies    No Known Allergies    Intolerances        VITAL SIGNS:   Vital Signs Last 24 Hrs  T(C): 36.6 (14 Dec 2021 04:27), Max: 36.8 (13 Dec 2021 22:36)  T(F): 97.9 (14 Dec 2021 04:27), Max: 98.2 (13 Dec 2021 22:36)  HR: 55 (14 Dec 2021 04:27) (55 - 113)  BP: 101/73 (14 Dec 2021 04:27) (99/64 - 101/73)  BP(mean): --  RR: 18 (14 Dec 2021 04:27) (18 - 18)  SpO2: 91% (14 Dec 2021 04:27) (91% - 96%)  I&O's Summary    13 Dec 2021 07:01  -  14 Dec 2021 07:00  --------------------------------------------------------  IN: 0 mL / OUT: 2150 mL / NET: -2150 mL    14 Dec 2021 07:01  -  14 Dec 2021 10:52  --------------------------------------------------------  IN: 0 mL / OUT: 350 mL / NET: -350 mL        PHYSICAL EXAM:   GENERAL:  Appears stated age, no distress  HEENT:  NC/AT,  conjunctivae clear, sclera -anicteric  CHEST:  Full & symmetric excursion, no increased effort, breath sounds clear  HEART:  Regular rhythm, S1, S2, no murmur/rub/S3/S4,  no edema  ABDOMEN:  Soft, non-tender, non-distended, normoactive bowel sounds,  no masses, no hepatosplenomegaly,   EXTREMITIES: No cyanosis, clubbing or edema  SKIN:  No rash/erythema/ecchymoses/petechiae/wounds/abscess/warm/dry  NEURO:  Alert, oriented    LABS:  CBC Full  -  ( 14 Dec 2021 06:12 )  WBC Count : 11.91 K/uL  RBC Count : 4.02 M/uL  Hemoglobin : 11.5 g/dL  Hematocrit : 35.5 %  Platelet Count - Automated : 495 K/uL  Mean Cell Volume : 88.3 fl  Mean Cell Hemoglobin : 28.6 pg  Mean Cell Hemoglobin Concentration : 32.4 gm/dL  Auto Neutrophil # : x  Auto Lymphocyte # : x  Auto Monocyte # : x  Auto Eosinophil # : x  Auto Basophil # : x  Auto Neutrophil % : x  Auto Lymphocyte % : x  Auto Monocyte % : x  Auto Eosinophil % : x  Auto Basophil % : x    12-14    141  |  108<H>  |  7.8<L>  ----------------------------<  103<H>  4.1   |  26.0  |  0.36<L>    Ca    8.3<L>      14 Dec 2021 06:12  Phos  2.5     12-14  Mg     1.8     12-14    TPro  4.5<L>  /  Alb  1.7<L>  /  TBili  0.4  /  DBili  0.1  /  AST  17  /  ALT  17  /  AlkPhos  100  12-13    LIVER FUNCTIONS - ( 13 Dec 2021 09:33 )  Alb: 1.7 g/dL / Pro: 4.5 g/dL / ALK PHOS: 100 U/L / ALT: 17 U/L / AST: 17 U/L / GGT: x           PT/INR - ( 13 Dec 2021 10:24 )   PT: 13.2 sec;   INR: 1.15 ratio               Culture - Body Fluid with Gram Stain (collected 12 Dec 2021 20:51)  Source: Abdominal Fl RUQ Abdominal fluid  Gram Stain (12 Dec 2021 23:28):    polymorphonuclear leukocytes seen    No organisms seen    by cytocentrifuge  Preliminary Report (13 Dec 2021 19:24):    Rare Enterococcus faecalis    Culture - Body Fluid with Gram Stain (collected 12 Dec 2021 20:48)  Source: Abdominal Fl RLQ Abdominal Fluid  Gram Stain (12 Dec 2021 23:27):    polymorphonuclear leukocytes seen    No organisms seen    by cytocentrifuge  Preliminary Report (13 Dec 2021 17:59):    No growth    Culture - Blood (collected 11 Dec 2021 14:18)  Source: .Blood Blood  Preliminary Report (13 Dec 2021 15:01):    No growth at 48 hours        RADIOLOGY & ADDITIONAL STUDIES (The following images were personally reviewed):         Chief Complaint:  Patient is a 61y old  Female who presents with a chief complaint of Volvulus. Being seen as a follow up for ascites and possible TPN.       Interval Events / Subjective:  Patient seen and evaluated at bedside, no complaints, no overnight events. Patient with increasing pneumoperitoneum on cross-sectional imaging most recently 12/11/2021 and had IR directed paracentesis yesterday where roughly 100 cc. of ascitic fluid was removed with drainage catheters in place draining clear yellow ascitic fluid. Dobbhoff tube in place.       REVIEW OF SYSTEMS:   Unable to obtain     MEDICATIONS:   MEDICATIONS  (STANDING):  albuterol/ipratropium for Nebulization 3 milliLiter(s) Nebulizer every 6 hours  cholecalciferol 2000 Unit(s) Oral daily  dextrose 40% Gel 15 Gram(s) Oral once  dextrose 5% + lactated ringers. 1000 milliLiter(s) (100 mL/Hr) IV Continuous <Continuous>  dextrose 5% + sodium chloride 0.9% 1000 milliLiter(s) (100 mL/Hr) IV Continuous <Continuous>  dextrose 5%. 1000 milliLiter(s) (50 mL/Hr) IV Continuous <Continuous>  dextrose 5%. 1000 milliLiter(s) (100 mL/Hr) IV Continuous <Continuous>  dextrose 50% Injectable 25 Gram(s) IV Push once  dextrose 50% Injectable 12.5 Gram(s) IV Push once  dextrose 50% Injectable 25 Gram(s) IV Push once  enoxaparin Injectable 40 milliGRAM(s) SubCutaneous daily  escitalopram 5 milliGRAM(s) Oral daily  fluticasone propionate 50 MICROgram(s)/spray Nasal Spray 1 Spray(s) Both Nostrils every 12 hours  glucagon  Injectable 1 milliGRAM(s) IntraMuscular once  insulin lispro (ADMELOG) corrective regimen sliding scale   SubCutaneous every 6 hours  lactobacillus acidophilus 1 Tablet(s) Oral daily  levothyroxine Injectable 12.5 MICROGram(s) IV Push <User Schedule>  loratadine 10 milliGRAM(s) Oral daily  magnesium sulfate  IVPB 2 Gram(s) IV Intermittent once  mesalamine DR Capsule 800 milliGRAM(s) Oral every 12 hours  piperacillin/tazobactam IVPB.. 3.375 Gram(s) IV Intermittent every 8 hours  polyethylene glycol 3350 17 Gram(s) Oral daily  potassium phosphate / sodium phosphate Powder (PHOS-NaK) 1 Packet(s) Oral every 4 hours  torsemide 20 milliGRAM(s) Oral daily    MEDICATIONS  (PRN):  ALBUTerol    90 MICROgram(s) HFA Inhaler 2 Puff(s) Inhalation every 6 hours PRN Shortness of Breath and/or Wheezing  aluminum hydroxide/magnesium hydroxide/simethicone Suspension 30 milliLiter(s) Oral every 4 hours PRN Dyspepsia  dextrose 50% Injectable 50 milliLiter(s) IV Push once PRN Glucose <70      ALLERGIES:   Allergies    No Known Allergies    Intolerances        VITAL SIGNS:   Vital Signs Last 24 Hrs  T(C): 36.6 (14 Dec 2021 04:27), Max: 36.8 (13 Dec 2021 22:36)  T(F): 97.9 (14 Dec 2021 04:27), Max: 98.2 (13 Dec 2021 22:36)  HR: 55 (14 Dec 2021 04:27) (55 - 113)  BP: 101/73 (14 Dec 2021 04:27) (99/64 - 101/73)  BP(mean): --  RR: 18 (14 Dec 2021 04:27) (18 - 18)  SpO2: 91% (14 Dec 2021 04:27) (91% - 96%)  I&O's Summary    13 Dec 2021 07:01  -  14 Dec 2021 07:00  --------------------------------------------------------  IN: 0 mL / OUT: 2150 mL / NET: -2150 mL    14 Dec 2021 07:01  -  14 Dec 2021 10:52  --------------------------------------------------------  IN: 0 mL / OUT: 350 mL / NET: -350 mL        PHYSICAL EXAM:   GENERAL: Cathectic, Nonverbal, rocks back and forth, no acute distress  HEENT:  NC/AT,  conjunctivae clear, sclera -anicteric  CHEST:  Full & symmetric excursion, no increased effort, breath sounds clear  HEART:  Regular rhythm, S1, S2, no murmur/rub/S3/S4,  no edema  ABDOMEN:  Limited exam as patient uncooperative. Soft, non-tender, non-distended, normoactive bowel sounds,  no masses, no hepatosplenomegaly,   EXTREMITIES: No cyanosis, clubbing or edema  SKIN:  No rash/erythema/ecchymoses/petechiae/wounds/abscess/warm/dry  NEURO:  Alert, oriented    LABS:  CBC Full  -  ( 14 Dec 2021 06:12 )  WBC Count : 11.91 K/uL  RBC Count : 4.02 M/uL  Hemoglobin : 11.5 g/dL  Hematocrit : 35.5 %  Platelet Count - Automated : 495 K/uL  Mean Cell Volume : 88.3 fl  Mean Cell Hemoglobin : 28.6 pg  Mean Cell Hemoglobin Concentration : 32.4 gm/dL  Auto Neutrophil # : x  Auto Lymphocyte # : x  Auto Monocyte # : x  Auto Eosinophil # : x  Auto Basophil # : x  Auto Neutrophil % : x  Auto Lymphocyte % : x  Auto Monocyte % : x  Auto Eosinophil % : x  Auto Basophil % : x    12-14    141  |  108<H>  |  7.8<L>  ----------------------------<  103<H>  4.1   |  26.0  |  0.36<L>    Ca    8.3<L>      14 Dec 2021 06:12  Phos  2.5     12-14  Mg     1.8     12-14    TPro  4.5<L>  /  Alb  1.7<L>  /  TBili  0.4  /  DBili  0.1  /  AST  17  /  ALT  17  /  AlkPhos  100  12-13    LIVER FUNCTIONS - ( 13 Dec 2021 09:33 )  Alb: 1.7 g/dL / Pro: 4.5 g/dL / ALK PHOS: 100 U/L / ALT: 17 U/L / AST: 17 U/L / GGT: x           PT/INR - ( 13 Dec 2021 10:24 )   PT: 13.2 sec;   INR: 1.15 ratio               Culture - Body Fluid with Gram Stain (collected 12 Dec 2021 20:51)  Source: Abdominal Fl RUQ Abdominal fluid  Gram Stain (12 Dec 2021 23:28):    polymorphonuclear leukocytes seen    No organisms seen    by cytocentrifuge  Preliminary Report (13 Dec 2021 19:24):    Rare Enterococcus faecalis    Culture - Body Fluid with Gram Stain (collected 12 Dec 2021 20:48)  Source: Abdominal Fl RLQ Abdominal Fluid  Gram Stain (12 Dec 2021 23:27):    polymorphonuclear leukocytes seen    No organisms seen    by cytocentrifuge  Preliminary Report (13 Dec 2021 17:59):    No growth    Culture - Blood (collected 11 Dec 2021 14:18)  Source: .Blood Blood  Preliminary Report (13 Dec 2021 15:01):    No growth at 48 hours        RADIOLOGY & ADDITIONAL STUDIES (The following images were personally reviewed):    ACC: [id number] EXAM:  CT ABDOMEN AND PELVIS IC                        ACC: [id number] EXAM:  CT CHEST IC                          PROCEDURE DATE:  12/11/2021          INTERPRETATION:  CLINICAL INFORMATION: Clinically deteriorating    COMPARISON: ChestCT 12/10/2021, CT abdomen pelvis 12/7/2020    CONTRAST/COMPLICATIONS:  IV Contrast: Omnipaque 300   94 cc administered   6 cc discarded  Oral Contrast: NONE  Complications: None reported at time of study completion    PROCEDURE:  CT of the Chest, Abdomen and Pelvis was performed.  Sagittal and coronal reformats were performed.    FINDINGS:  CHEST:  LUNGS AND LARGE AIRWAYS: The central airways are patent. The lungs are   clear aside from bibasilar atelectasis.  PLEURA: Small bilateral effusions.  VESSELS: Normal caliber aorta. Main pulmonary arteries are patent.  HEART: No cardiomegaly. No pericardial effusion.  MEDIASTINUM AND ATIYA: No adenopathy.  CHEST WALL AND LOWER NECK: Diffuse chest wall edema.    ABDOMEN AND PELVIS:  LIVER: Normal.  BILE DUCTS: Nondilated.  GALLBLADDER: Diffuse nonspecific wall edema.  SPLEEN: Normal.  PANCREAS: Normal.  ADRENALS: Normal.  KIDNEYS/URETERS: No calculi, hydronephrosis, or soft tissue attenuating   mass.    BLADDER: Small air in the lumen.  REPRODUCTIVE ORGANS: No masses.    BOWEL: The NG tube is in the stomach. Diffuse gaseous distention of the   bowel. Ostomy again noted. Rectal stump edema again noted. No definite   source of perforation.  PERITONEUM: Large ascites. Worsening of large pneumoperitoneum.  VESSELS: Normal caliber aorta.  RETROPERITONEUM/LYMPH NODES: No adenopathy.  ABDOMINAL WALL: Diffuse body wall edema.  BONES: No acute bony abnormality.    IMPRESSION:  *  Worsening of large pneumoperitoneum.      --- End of Report ---            TERESITA GARCÍA MD; Attending Radiologist  This document has been electronically signed. Dec 11 2021  5:48PM

## 2021-12-14 NOTE — CONSULT NOTE ADULT - ASSESSMENT
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   Due to likelihood of recurrence, surgical intervention was recommended, s/p open sigmoidectomy with ostomy creation.   Placed for nutritional support, incidentally discovered to have a pneumoperitoneum after work up.  CT imaging with IV contrast confirmed pneumoperitoneum and ascites, no evidence of perforated viscus but that assessment could not be ruled out.   Patient with intermittent hypotension and tachycardia now, repeat CT with worsening pneumoperitoneum and ascites- IR placed two peritoneal drains for persistent ascities, cultures sent + enterococcus. BCX (-). Ascites and likely secondary bacterial peritonitis due to pneumoperitoneum of unclear etiology.   Agree with zosyn-duration to be determined  f/u final CX  would repeat Ct oral contrast next week    d/w resident  will f/u

## 2021-12-15 LAB
ANION GAP SERPL CALC-SCNC: 11 MMOL/L — SIGNIFICANT CHANGE UP (ref 5–17)
BUN SERPL-MCNC: 8 MG/DL — SIGNIFICANT CHANGE UP (ref 8–20)
CALCIUM SERPL-MCNC: 8.2 MG/DL — LOW (ref 8.6–10.2)
CHLORIDE SERPL-SCNC: 108 MMOL/L — HIGH (ref 98–107)
CO2 SERPL-SCNC: 19 MMOL/L — LOW (ref 22–29)
CREAT SERPL-MCNC: 0.32 MG/DL — LOW (ref 0.5–1.3)
GLUCOSE BLDC GLUCOMTR-MCNC: 108 MG/DL — HIGH (ref 70–99)
GLUCOSE BLDC GLUCOMTR-MCNC: 116 MG/DL — HIGH (ref 70–99)
GLUCOSE BLDC GLUCOMTR-MCNC: 86 MG/DL — SIGNIFICANT CHANGE UP (ref 70–99)
GLUCOSE BLDC GLUCOMTR-MCNC: 86 MG/DL — SIGNIFICANT CHANGE UP (ref 70–99)
GLUCOSE BLDC GLUCOMTR-MCNC: 97 MG/DL — SIGNIFICANT CHANGE UP (ref 70–99)
GLUCOSE SERPL-MCNC: 107 MG/DL — HIGH (ref 70–99)
MAGNESIUM SERPL-MCNC: 2.3 MG/DL — SIGNIFICANT CHANGE UP (ref 1.6–2.6)
PHOSPHATE SERPL-MCNC: 2.7 MG/DL — SIGNIFICANT CHANGE UP (ref 2.4–4.7)
POTASSIUM SERPL-MCNC: 5.2 MMOL/L — SIGNIFICANT CHANGE UP (ref 3.5–5.3)
POTASSIUM SERPL-SCNC: 5.2 MMOL/L — SIGNIFICANT CHANGE UP (ref 3.5–5.3)
SODIUM SERPL-SCNC: 137 MMOL/L — SIGNIFICANT CHANGE UP (ref 135–145)

## 2021-12-15 PROCEDURE — 99232 SBSQ HOSP IP/OBS MODERATE 35: CPT

## 2021-12-15 RX ORDER — SODIUM CHLORIDE 9 MG/ML
1000 INJECTION, SOLUTION INTRAVENOUS ONCE
Refills: 0 | Status: COMPLETED | OUTPATIENT
Start: 2021-12-15 | End: 2021-12-15

## 2021-12-15 RX ADMIN — PIPERACILLIN AND TAZOBACTAM 25 GRAM(S): 4; .5 INJECTION, POWDER, LYOPHILIZED, FOR SOLUTION INTRAVENOUS at 06:01

## 2021-12-15 RX ADMIN — Medication 3 MILLILITER(S): at 04:53

## 2021-12-15 RX ADMIN — ESCITALOPRAM OXALATE 5 MILLIGRAM(S): 10 TABLET, FILM COATED ORAL at 13:13

## 2021-12-15 RX ADMIN — Medication 62.5 MILLIMOLE(S): at 10:42

## 2021-12-15 RX ADMIN — SODIUM CHLORIDE 100 MILLILITER(S): 9 INJECTION, SOLUTION INTRAVENOUS at 21:42

## 2021-12-15 RX ADMIN — LORATADINE 10 MILLIGRAM(S): 10 TABLET ORAL at 13:14

## 2021-12-15 RX ADMIN — POLYETHYLENE GLYCOL 3350 17 GRAM(S): 17 POWDER, FOR SOLUTION ORAL at 13:14

## 2021-12-15 RX ADMIN — ENOXAPARIN SODIUM 40 MILLIGRAM(S): 100 INJECTION SUBCUTANEOUS at 12:24

## 2021-12-15 RX ADMIN — Medication 20 MILLIGRAM(S): at 06:01

## 2021-12-15 RX ADMIN — Medication 3 MILLILITER(S): at 21:03

## 2021-12-15 RX ADMIN — SODIUM CHLORIDE 500 MILLILITER(S): 9 INJECTION, SOLUTION INTRAVENOUS at 08:15

## 2021-12-15 RX ADMIN — Medication 1 TABLET(S): at 13:14

## 2021-12-15 RX ADMIN — Medication 3 MILLILITER(S): at 10:23

## 2021-12-15 RX ADMIN — Medication 1 SPRAY(S): at 06:00

## 2021-12-15 RX ADMIN — PIPERACILLIN AND TAZOBACTAM 25 GRAM(S): 4; .5 INJECTION, POWDER, LYOPHILIZED, FOR SOLUTION INTRAVENOUS at 13:14

## 2021-12-15 RX ADMIN — Medication 3 MILLILITER(S): at 16:05

## 2021-12-15 RX ADMIN — Medication 1 SPRAY(S): at 17:47

## 2021-12-15 RX ADMIN — Medication 2000 UNIT(S): at 13:14

## 2021-12-15 RX ADMIN — Medication 12.5 MICROGRAM(S): at 06:02

## 2021-12-15 RX ADMIN — SODIUM CHLORIDE 100 MILLILITER(S): 9 INJECTION, SOLUTION INTRAVENOUS at 10:42

## 2021-12-15 RX ADMIN — PIPERACILLIN AND TAZOBACTAM 25 GRAM(S): 4; .5 INJECTION, POWDER, LYOPHILIZED, FOR SOLUTION INTRAVENOUS at 21:41

## 2021-12-15 RX ADMIN — Medication 800 MILLIGRAM(S): at 17:47

## 2021-12-15 NOTE — PROGRESS NOTE ADULT - SUBJECTIVE AND OBJECTIVE BOX
Acute Care Surgery/Trauma Surgery Progress Note:    Patient evaluated at bedside. No acute distress. No acute events over night. Remains hemodynamically stable and afebrile.   Patient's ostomy site continues to be pink and patent with fecal fluid output.  Her two R abdominal drains with clear, ascitic fluid.  No other acute overnight events. Patient afebrile, VSS. Pain well controlled. Tolerating tube feed diet. No n/v/f/c/cp/sob.   On TPN at goal    Diet, NPO with Tube Feed:   Tube Feeding Modality: Nasogastric  Pivot 1.5 Troy (PIVOT1.5RTH)  Total Volume for 24 Hours (mL): 960  Continuous  Starting Tube Feed Rate mL per Hour: 20  Increase Tube Feed Rate by (mL): 10     Every 2 hours  Until Goal Tube Feed Rate (mL per Hour): 40  Tube Feed Duration (in Hours): 24  Tube Feed Start Time: 14:40 (21 @ 14:37)      Scheduled Medications:   albuterol/ipratropium for Nebulization 3 milliLiter(s) Nebulizer every 6 hours  cholecalciferol 2000 Unit(s) Oral daily  dextrose 40% Gel 15 Gram(s) Oral once  dextrose 5% + lactated ringers. 1000 milliLiter(s) (100 mL/Hr) IV Continuous <Continuous>  dextrose 5% + sodium chloride 0.9% 1000 milliLiter(s) (100 mL/Hr) IV Continuous <Continuous>  dextrose 5%. 1000 milliLiter(s) (100 mL/Hr) IV Continuous <Continuous>  dextrose 5%. 1000 milliLiter(s) (50 mL/Hr) IV Continuous <Continuous>  dextrose 50% Injectable 12.5 Gram(s) IV Push once  dextrose 50% Injectable 25 Gram(s) IV Push once  dextrose 50% Injectable 25 Gram(s) IV Push once  enoxaparin Injectable 40 milliGRAM(s) SubCutaneous daily  escitalopram 5 milliGRAM(s) Oral daily  fluticasone propionate 50 MICROgram(s)/spray Nasal Spray 1 Spray(s) Both Nostrils every 12 hours  glucagon  Injectable 1 milliGRAM(s) IntraMuscular once  insulin lispro (ADMELOG) corrective regimen sliding scale   SubCutaneous every 6 hours  lactobacillus acidophilus 1 Tablet(s) Oral daily  levothyroxine Injectable 12.5 MICROGram(s) IV Push <User Schedule>  loratadine 10 milliGRAM(s) Oral daily  mesalamine DR Capsule 800 milliGRAM(s) Oral every 12 hours  piperacillin/tazobactam IVPB.. 3.375 Gram(s) IV Intermittent every 8 hours  polyethylene glycol 3350 17 Gram(s) Oral daily  torsemide 20 milliGRAM(s) Oral daily    PRN Medications:  ALBUTerol    90 MICROgram(s) HFA Inhaler 2 Puff(s) Inhalation every 6 hours PRN Shortness of Breath and/or Wheezing  aluminum hydroxide/magnesium hydroxide/simethicone Suspension 30 milliLiter(s) Oral every 4 hours PRN Dyspepsia  dextrose 50% Injectable 50 milliLiter(s) IV Push once PRN Glucose <70      Objective:   T(F): 97.4 ( @ 23:25), Max: 98.2 ( @ 22:36)  HR: 78 ( @ 03:08) (66 - 113)  BP: 99/64 ( @ 23:25) (99/64 - 104/68)  BP(mean): --  ABP: --  ABP(mean): --  RR: 18 ( @ 22:36) (18 - 18)  SpO2: 96% ( @ 03:08) (92% - 96%)      Physical Exam:   GEN: patient resting comfortably in bed, in no acute distress  RESP: respirations are unlabored, no accessory muscle use  CVS: RRR  GI: Abdomen soft, non-tender, non-distended, no rebound tenderness / guarding, ostomy to Left hemiabdomen, Drains to Right abdomenx2 with clear ascitic fluid    I&O's     @ 07:01  -   @ 07:00  --------------------------------------------------------  IN:    dextrose 5% + sodium chloride 0.9% w/ Additives: 1000 mL    IV PiggyBack: 100 mL  Total IN: 1100 mL    OUT:    Colostomy (mL): 200 mL    Drain (mL): 1450 mL    Drain (mL): 1250 mL    Indwelling Catheter - Urethral (mL): 1050 mL    Nasogastric/Oral tube (mL): 450 mL    Oral Fluid: 0 mL    Vital1.5: 0 mL  Total OUT: 4400 mL    Total NET: -3300 mL       @ 07:01  -   @ 04:30  --------------------------------------------------------  IN:  Total IN: 0 mL    OUT:    Drain (mL): 350 mL  Total OUT: 350 mL    Total NET: -350 mL          LABS:                        11.4   12.67 )-----------( 453      ( 13 Dec 2021 09:33 )             34.8         140  |  104  |  9.5  ----------------------------<  246<H>  3.9   |  28.0  |  0.43<L>    Ca    7.8<L>      13 Dec 2021 09:33  Phos  2.5       Mg     2.0         TPro  4.5<L>  /  Alb  1.7<L>  /  TBili  0.4  /  DBili  0.1  /  AST  17  /  ALT  17  /  AlkPhos  100      PT/INR - ( 13 Dec 2021 10:24 )   PT: 13.2 sec;   INR: 1.15 ratio           Urinalysis Basic - ( 12 Dec 2021 05:44 )    Color: Yellow / Appearance: Clear / S.020 / pH: x  Gluc: x / Ketone: Negative  / Bili: Negative / Urobili: Negative mg/dL   Blood: x / Protein: 30 mg/dL / Nitrite: Negative   Leuk Esterase: Negative / RBC: 0-2 /HPF / WBC 0-2   Sq Epi: x / Non Sq Epi: Few / Bacteria: Few        MICROBIOLOGY:     Culture - Body Fluid with Gram Stain (collected  @ 20:51)  Source: Abdominal Fl RUQ Abdominal fluid  Gram Stain ( @ 23:28):    polymorphonuclear leukocytes seen    No organisms seen    by cytocentrifuge  Preliminary Report ( @ 19:24):    Rare Enterococcus faecalis    Culture - Body Fluid with Gram Stain (collected  @ 20:48)  Source: Abdominal Fl RLQ Abdominal Fluid  Gram Stain ( @ 23:27):    polymorphonuclear leukocytes seen    No organisms seen    by cytocentrifuge  Preliminary Report ( @ 17:59):    No growth    Culture - Blood (collected  @ 14:18)  Source: .Blood Blood  Preliminary Report ( @ 15:01):    No growth at 48 hours        PATHOLOGY:       Acute Care Surgery/Trauma Surgery Progress Note:    Patient evaluated at bedside. No acute distress. No acute events over night. Remains hemodynamically stable and afebrile.   Patient's ostomy site continues to be pink and patent with fecal fluid output.  Her two R abdominal drains with clear, ascitic fluid.  No other acute overnight events. Patient afebrile, VSS. Pain well controlled. Tolerating tube feed diet. No n/v/f/c/cp/sob.       Diet, NPO with Tube Feed:   Tube Feeding Modality: Nasogastric  Pivot 1.5 Troy (PIVOT1.5RTH)  Total Volume for 24 Hours (mL): 960  Continuous  Starting Tube Feed Rate mL per Hour: 20  Increase Tube Feed Rate by (mL): 10     Every 2 hours  Until Goal Tube Feed Rate (mL per Hour): 40  Tube Feed Duration (in Hours): 24  Tube Feed Start Time: 14:40 (21 @ 14:37)      Scheduled Medications:   albuterol/ipratropium for Nebulization 3 milliLiter(s) Nebulizer every 6 hours  cholecalciferol 2000 Unit(s) Oral daily  dextrose 40% Gel 15 Gram(s) Oral once  dextrose 5% + lactated ringers. 1000 milliLiter(s) (100 mL/Hr) IV Continuous <Continuous>  dextrose 5% + sodium chloride 0.9% 1000 milliLiter(s) (100 mL/Hr) IV Continuous <Continuous>  dextrose 5%. 1000 milliLiter(s) (100 mL/Hr) IV Continuous <Continuous>  dextrose 5%. 1000 milliLiter(s) (50 mL/Hr) IV Continuous <Continuous>  dextrose 50% Injectable 12.5 Gram(s) IV Push once  dextrose 50% Injectable 25 Gram(s) IV Push once  dextrose 50% Injectable 25 Gram(s) IV Push once  enoxaparin Injectable 40 milliGRAM(s) SubCutaneous daily  escitalopram 5 milliGRAM(s) Oral daily  fluticasone propionate 50 MICROgram(s)/spray Nasal Spray 1 Spray(s) Both Nostrils every 12 hours  glucagon  Injectable 1 milliGRAM(s) IntraMuscular once  insulin lispro (ADMELOG) corrective regimen sliding scale   SubCutaneous every 6 hours  lactobacillus acidophilus 1 Tablet(s) Oral daily  levothyroxine Injectable 12.5 MICROGram(s) IV Push <User Schedule>  loratadine 10 milliGRAM(s) Oral daily  mesalamine DR Capsule 800 milliGRAM(s) Oral every 12 hours  piperacillin/tazobactam IVPB.. 3.375 Gram(s) IV Intermittent every 8 hours  polyethylene glycol 3350 17 Gram(s) Oral daily  torsemide 20 milliGRAM(s) Oral daily    PRN Medications:  ALBUTerol    90 MICROgram(s) HFA Inhaler 2 Puff(s) Inhalation every 6 hours PRN Shortness of Breath and/or Wheezing  aluminum hydroxide/magnesium hydroxide/simethicone Suspension 30 milliLiter(s) Oral every 4 hours PRN Dyspepsia  dextrose 50% Injectable 50 milliLiter(s) IV Push once PRN Glucose <70      Objective:   T(F): 97.4 ( @ 23:25), Max: 98.2 ( @ 22:36)  HR: 78 ( @ 03:08) (66 - 113)  BP: 99/64 ( @ 23:25) (99/64 - 104/68)  BP(mean): --  ABP: --  ABP(mean): --  RR: 18 ( @ 22:36) (18 - 18)  SpO2: 96% ( @ 03:08) (92% - 96%)      Physical Exam:   GEN: patient resting comfortably in bed, in no acute distress  RESP: respirations are unlabored, no accessory muscle use  CVS: RRR  GI: Abdomen soft, non-tender, non-distended, no rebound tenderness / guarding, ostomy to Left hemiabdomen, Drains to Right abdomenx2 with clear ascitic fluid    I&O's     @ 07:01  -   @ 07:00  --------------------------------------------------------  IN:    dextrose 5% + sodium chloride 0.9% w/ Additives: 1000 mL    IV PiggyBack: 100 mL  Total IN: 1100 mL    OUT:    Colostomy (mL): 200 mL    Drain (mL): 1450 mL    Drain (mL): 1250 mL    Indwelling Catheter - Urethral (mL): 1050 mL    Nasogastric/Oral tube (mL): 450 mL    Oral Fluid: 0 mL    Vital1.5: 0 mL  Total OUT: 4400 mL    Total NET: -3300 mL       @ 07:01  -   @ 04:30  --------------------------------------------------------  IN:  Total IN: 0 mL    OUT:    Drain (mL): 350 mL  Total OUT: 350 mL    Total NET: -350 mL          LABS:                        11.4   12.67 )-----------( 453      ( 13 Dec 2021 09:33 )             34.8         140  |  104  |  9.5  ----------------------------<  246<H>  3.9   |  28.0  |  0.43<L>    Ca    7.8<L>      13 Dec 2021 09:33  Phos  2.5       Mg     2.0         TPro  4.5<L>  /  Alb  1.7<L>  /  TBili  0.4  /  DBili  0.1  /  AST  17  /  ALT  17  /  AlkPhos  100      PT/INR - ( 13 Dec 2021 10:24 )   PT: 13.2 sec;   INR: 1.15 ratio           Urinalysis Basic - ( 12 Dec 2021 05:44 )    Color: Yellow / Appearance: Clear / S.020 / pH: x  Gluc: x / Ketone: Negative  / Bili: Negative / Urobili: Negative mg/dL   Blood: x / Protein: 30 mg/dL / Nitrite: Negative   Leuk Esterase: Negative / RBC: 0-2 /HPF / WBC 0-2   Sq Epi: x / Non Sq Epi: Few / Bacteria: Few        MICROBIOLOGY:     Culture - Body Fluid with Gram Stain (collected  @ 20:51)  Source: Abdominal Fl RUQ Abdominal fluid  Gram Stain ( @ 23:28):    polymorphonuclear leukocytes seen    No organisms seen    by cytocentrifuge  Preliminary Report ( @ 19:24):    Rare Enterococcus faecalis    Culture - Body Fluid with Gram Stain (collected  @ 20:48)  Source: Abdominal Fl RLQ Abdominal Fluid  Gram Stain ( @ 23:27):    polymorphonuclear leukocytes seen    No organisms seen    by cytocentrifuge  Preliminary Report ( @ 17:59):    No growth    Culture - Blood (collected  @ 14:18)  Source: .Blood Blood  Preliminary Report ( @ 15:01):    No growth at 48 hours        PATHOLOGY:

## 2021-12-15 NOTE — PROGRESS NOTE ADULT - ASSESSMENT
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   Due to likelihood of recurrence, surgical intervention was recommended.   Now s/p open sigmoidectomy with ostomy creation. Patient progressing well with functioning ostomy. NG tube   Placed for nutritional support, incidentally discovered to have a pneumoperitoneum after work up.  CT imaging with IV contrast confirmed pneumoperitoneum and ascites, no evidence of perforated viscus but that assessment could not be ruled out.  She was seen by speech and swallow that recommended pureed diet, moderately thick with supplemental tube feeds.    IR placed two peritoneal drains for persistent ascities, cultures sent.    NPO on TPN    PLAN:    - Pain control MM   - Monitor ostomy o/p   - Monitor drainx2  output   - NPO/Tube feeds  - Monitor lytes &  replete PRN   - Monitor lower extremity edema and upper extremity temperatures/cyanosis  - Monitor vitals and bolus prn secondary to ascitic output  - F/U Med recs  - FU IR drain cultures   - Continue with Zosyn  - F/U Med recs

## 2021-12-15 NOTE — PROGRESS NOTE ADULT - SUBJECTIVE AND OBJECTIVE BOX
[facility]  INFECTIOUS DISEASES AND INTERNAL MEDICINE at [place]  =======================================================  Jeb Bartlett MD  Diplomates American Board of Internal Medicine and Infectious Diseases  Tel: [phone]      Fax: [phone]  =======================================================    NANCY RAMIREZ [id number]    Follow up: ascites, pneumoperitoneum  afebrile      Allergies:  No Known Allergies           REVIEW OF SYSTEMS:  cannot obtain      Physical Exam:  GEN: NAD, nonverbal  HEENT: normocephalic and atraumatic. EOMI. PERRL.  Anicteric   NECK: Supple.   LUNGS: Clear to auscultation.  HEART: Regular rate and rhythm without murmur.  ABDOMEN: Soft, nontender, and nondistended.  Positive bowel sounds.  rt abdominal IR drains with serous output  : No CVA tenderness  EXTREMITIES: Without any edema.  MSK: no joint swelling    SKIN: No Rash      Vitals:    T(F): 98.1 (15 Dec 2021 15:44), Max: 98.7 (14 Dec 2021 20:30)  HR: 94 (15 Dec 2021 15:44)  BP: 109/70 (15 Dec 2021 15:44)  RR: 18 (15 Dec 2021 11:42)  SpO2: 96% (15 Dec 2021 11:42) (93% - 98%)  temp max in last 48H T(F): , Max: 98.7 (12-14-21 @ 20:30)    Current Antibiotics:  piperacillin/tazobactam IVPB.. 3.375 Gram(s) IV Intermittent every 8 hours    Other medications:  albuterol/ipratropium for Nebulization 3 milliLiter(s) Nebulizer every 6 hours  cholecalciferol 2000 Unit(s) Oral daily  dextrose 40% Gel 15 Gram(s) Oral once  dextrose 5% + lactated ringers. 1000 milliLiter(s) IV Continuous <Continuous>  dextrose 5% + sodium chloride 0.9% 1000 milliLiter(s) IV Continuous <Continuous>  dextrose 5%. 1000 milliLiter(s) IV Continuous <Continuous>  dextrose 5%. 1000 milliLiter(s) IV Continuous <Continuous>  dextrose 50% Injectable 25 Gram(s) IV Push once  dextrose 50% Injectable 12.5 Gram(s) IV Push once  dextrose 50% Injectable 25 Gram(s) IV Push once  enoxaparin Injectable 40 milliGRAM(s) SubCutaneous daily  escitalopram 5 milliGRAM(s) Oral daily  fluticasone propionate 50 MICROgram(s)/spray Nasal Spray 1 Spray(s) Both Nostrils every 12 hours  glucagon  Injectable 1 milliGRAM(s) IntraMuscular once  insulin lispro (ADMELOG) corrective regimen sliding scale   SubCutaneous every 6 hours  lactobacillus acidophilus 1 Tablet(s) Oral daily  levothyroxine Injectable 12.5 MICROGram(s) IV Push <User Schedule>  loratadine 10 milliGRAM(s) Oral daily  mesalamine DR Capsule 800 milliGRAM(s) Oral every 12 hours  polyethylene glycol 3350 17 Gram(s) Oral daily  potassium phosphate / sodium phosphate Powder (PHOS-NaK) 1 Packet(s) Oral every 4 hours  sodium phosphate IVPB 30 milliMole(s) IV Intermittent once  torsemide 20 milliGRAM(s) Oral daily                            11.5   11.91 )-----------( 495      ( 14 Dec 2021 06:12 )             35.5     12-15    137  |  108<H>  |  8.0  ----------------------------<  107<H>  5.2   |  19.0<L>  |  0.32<L>    Ca    8.2<L>      15 Dec 2021 05:19  Phos  2.7     12-15  Mg     2.3     12-15      RECENT CULTURES:  12-12 @ 20:51 Abdominal Fl RUQ Abdominal fluid Enterococcus faecalis    Rare Enterococcus faecalis    polymorphonuclear leukocytes seen  No organisms seen  by cytocentrifuge      12-12 @ 20:48 Abdominal Fl RLQ Abdominal Fluid     No growth    polymorphonuclear leukocytes seen  No organisms seen  by cytocentrifuge      12-11 @ 14:18 .Blood Blood     No growth at 48 hours            WBC Count: 11.91 K/uL (12-14-21 @ 06:12)  WBC Count: 12.67 K/uL (12-13-21 @ 09:33)  WBC Count: 19.29 K/uL (12-12-21 @ 07:09)  WBC Count: 21.59 K/uL (12-11-21 @ 06:31)    Creatinine, Serum: 0.32 mg/dL (12-15-21 @ 05:19)  Creatinine, Serum: 0.36 mg/dL (12-14-21 @ 06:12)  Creatinine, Serum: 0.43 mg/dL (12-13-21 @ 09:33)  Creatinine, Serum: 0.48 mg/dL (12-12-21 @ 07:09)  Creatinine, Serum: 0.49 mg/dL (12-11-21 @ 06:31)               COVID-19 PCR: NotDetec (12-11-21 @ 01:09)  COVID-19 PCR: NotDetec (12-05-21 @ 13:25)  COVID-19 PCR: NotDetec (12-02-21 @ 16:41)  COVID-19 PCR: NotDetec (11-28-21 @ 17:45)

## 2021-12-15 NOTE — PROGRESS NOTE ADULT - ASSESSMENT
61 year old female with h/o severe developmental delay (MR and nonverbal at baseline), osteoporosis, hypothyroidism transferred to [facility] with sigmoid volvulus s/p status post colonoscopic decompression and rectal tube placement 11/27 and Bruce's procedure on 11/29, over the course she was having hypotension, abdominal distention and worsening leucocytosis, her feeding were held and was started on IV fluids and NG was placed, CT scan done showed Worsening of large pneumoperitoneum, she was initially give ceftriaxone that has been changed to Zosyn, blood cultures has been obtained, medicine consulted for medical Management      Plan:     Sepsis due to worsening Pneumoperitoneum:   Has NG connected with intermittent suction  will continue with gentle hydration, Repeat Blood cultures sent,   as per Primary team IR consulted ,  S/P 2 RUQ/RLQ drain placement on 12/12   with ascitic fluid drainage , still with high output   Fluid Cultures with Enterococcus Continue PipTazo 3.375 gm Q 8 hours for now ,   follow sensitivity    Daily abd exams  Worsening of pneumoperitoneum on CT noted -   GI following - recommending NPO / TPN         # Sigmoid volvulus-  status post colonoscopic decompression and rectal tube placement 11/27 and Bruce's procedure on 11/29  Management as per Primary team  Wound care/ ileostomy care / drains as per primary team       # Intellectual disability, Non verbal at baseline, Autism  - supportive care    # IBS  - mesalamine.     # depression: escitalopram once started on oral meds     # Hypothyroidism  - levothyroxine changed to IV, can change to PO when tolerating  PO     patient is from [facility]'s group home    Patient medication list shows she is on Torsamide 20mg daily, will hold for now as she is NPO.     Hypoglycemia noted on 12/13  - glucose x 2 given - resolved ,   continue accu check , continue D5     Nutrition - on TF - GI recommending NPO /TPN -   as per primary team     DVT prophylaxis  - on Lovenox     Schuster placed for I and O assessment     Follow daily labs , will no longer follow ,   please re call if needed

## 2021-12-15 NOTE — PROGRESS NOTE ADULT - ATTENDING COMMENTS
Patient was seen and examined this morning.  She is tolerating tube feeds at goal and having function from her colostomy.  Abdomen is soft and nontender.  IR drains with clear serous fluid.  Vitals are stable.  She is status post Bruce's procedure for sigmoid volvulus complicated by ascites and pneumoperitoneum of unclear etiology.  Cultures growing Bacteroides and patient currently on Zosyn.  ID managing and will clarify duration of antibiotics.  White blood cell count is coming down.  Plan to remove NG and start regular p.o. diet with assistance with feeding.  Bicarb is trending down, monitor.  Has had her home torsemide dose on hold.  Would check in if this should be resumed seeing that she is no longer n.p.o.  Discharge planning for the next 24 to 48 hours.

## 2021-12-15 NOTE — PROGRESS NOTE ADULT - SUBJECTIVE AND OBJECTIVE BOX
Patient seen and examined . Comfortable , NAD , nonverbal , not following commands .  No reported events overnight     CC : transferred from [facility] for volvulus            MEDICATIONS  (STANDING):  albuterol/ipratropium for Nebulization 3 milliLiter(s) Nebulizer every 6 hours  cholecalciferol 2000 Unit(s) Oral daily  dextrose 40% Gel 15 Gram(s) Oral once  dextrose 5% + lactated ringers. 1000 milliLiter(s) (100 mL/Hr) IV Continuous <Continuous>  dextrose 5% + sodium chloride 0.9% 1000 milliLiter(s) (100 mL/Hr) IV Continuous <Continuous>  dextrose 5%. 1000 milliLiter(s) (50 mL/Hr) IV Continuous <Continuous>  dextrose 5%. 1000 milliLiter(s) (100 mL/Hr) IV Continuous <Continuous>  dextrose 50% Injectable 25 Gram(s) IV Push once  dextrose 50% Injectable 12.5 Gram(s) IV Push once  dextrose 50% Injectable 25 Gram(s) IV Push once  enoxaparin Injectable 40 milliGRAM(s) SubCutaneous daily  escitalopram 5 milliGRAM(s) Oral daily  fluticasone propionate 50 MICROgram(s)/spray Nasal Spray 1 Spray(s) Both Nostrils every 12 hours  glucagon  Injectable 1 milliGRAM(s) IntraMuscular once  insulin lispro (ADMELOG) corrective regimen sliding scale   SubCutaneous every 6 hours  lactobacillus acidophilus 1 Tablet(s) Oral daily  levothyroxine Injectable 12.5 MICROGram(s) IV Push <User Schedule>  loratadine 10 milliGRAM(s) Oral daily  mesalamine DR Capsule 800 milliGRAM(s) Oral every 12 hours  piperacillin/tazobactam IVPB.. 3.375 Gram(s) IV Intermittent every 8 hours  polyethylene glycol 3350 17 Gram(s) Oral daily  potassium phosphate / sodium phosphate Powder (PHOS-NaK) 1 Packet(s) Oral every 4 hours  sodium phosphate IVPB 15 milliMole(s) IV Intermittent once  sodium phosphate IVPB 30 milliMole(s) IV Intermittent once  torsemide 20 milliGRAM(s) Oral daily    MEDICATIONS  (PRN):  ALBUTerol    90 MICROgram(s) HFA Inhaler 2 Puff(s) Inhalation every 6 hours PRN Shortness of Breath and/or Wheezing  aluminum hydroxide/magnesium hydroxide/simethicone Suspension 30 milliLiter(s) Oral every 4 hours PRN Dyspepsia  dextrose 50% Injectable 50 milliLiter(s) IV Push once PRN Glucose <70      LABS:                          11.5   11.91 )-----------( 495      ( 14 Dec 2021 06:12 )             35.5     12-15    137  |  108<H>  |  8.0  ----------------------------<  107<H>  5.2   |  19.0<L>  |  0.32<L>  Ca    8.2<L>      15 Dec 2021 05:19  Phos  2.7     12-15  Mg     2.3     12-15  I&O's Detail    14 Dec 2021 07:01  -  15 Dec 2021 07:00  --------------------------------------------------------  IN:    dextrose 5% + sodium chloride 0.9% w/ Additives: 1100 mL  Total IN: 1100 mL    OUT:    Colostomy (mL): 125 mL    Drain (mL): 175 mL    Drain (mL): 1100 mL    Indwelling Catheter - Urethral (mL): 1050 mL  Total OUT: 2450 mL    Total NET: -1350 mL    Culture - Body Fluid with Gram Stain (12.12.21 @ 20:51)    -  Ampicillin: S <=2 Predicts results to ampicillin/sulbactam, amoxacillin-clavulanate and  piperacillin-tazobactam.    -  Tetra/Doxy: R >8    -  Vancomycin: S 2    Gram Stain:   polymorphonuclear leukocytes seen  No organisms seen  by cytocentrifuge    Specimen Source: Abdominal Fl RUQ Abdominal fluid    Culture Results:   Rare Enterococcus faecalis    Organism Identification: Enterococcus faecalis    Organism: Enterococcus faecalis    Method Type: SEBASTIAN  Culture - Body Fluid with Gram Stain (12.12.21 @ 20:48)    Gram Stain:   polymorphonuclear leukocytes seen  No organisms seen  by cytocentrifuge    Specimen Source: Abdominal Fl RLQ Abdominal Fluid    Culture Results:   No growth    Culture - Blood (12.11.21 @ 14:18)    Specimen Source: .Blood Blood    Culture Results:   No growth at 48 hours      RADIOLOGY & ADDITIONAL TESTS:            REVIEW OF SYSTEMS:    UTO , patient is non verbal     Vital Signs Last 24 Hrs  T(C): 36.4 (15 Dec 2021 04:27), Max: 37.1 (14 Dec 2021 20:30)  T(F): 97.6 (15 Dec 2021 04:27), Max: 98.7 (14 Dec 2021 20:30)  HR: 80 (15 Dec 2021 05:06) (67 - 100)  BP: 119/83 (15 Dec 2021 04:27) (101/62 - 129/89)  BP(mean): --  RR: 18 (15 Dec 2021 04:27) (18 - 18)  SpO2: 98% (15 Dec 2021 05:06) (93% - 99%)    PHYSICAL EXAM:    GENERAL: NAD, non verbal   HEAD:  Atraumatic, Normocephalic  NGT +   EYES: EOMI, PERRLA, conjunctiva and sclera clear  NECK: Supple, No JVD, Normal thyroid  NERVOUS SYSTEM: Awake , nonverbal , not following commands ,   unable to assess   CHEST/LUNG: CTA b/l ,  no  rales, rhonchi, wheezing, or rubs  HEART: Regular rate and rhythm; No murmurs, rubs, or gallops  ABDOMEN: Soft, Nontender, Nondistended; Bowel sounds present,   ileostomy + , liquid stool+ , air + , pink stoma ,   RUQ/ RLQ drains and draining   EXTREMITIES:  2+ Peripheral Pulses, No clubbing, cyanosis, or edema  LYMPH: No lymphadenopathy noted  SKIN: No rashes or lesions   Schuster +

## 2021-12-16 LAB
ANION GAP SERPL CALC-SCNC: 12 MMOL/L — SIGNIFICANT CHANGE UP (ref 5–17)
BASOPHILS # BLD AUTO: 0.02 K/UL — SIGNIFICANT CHANGE UP (ref 0–0.2)
BASOPHILS NFR BLD AUTO: 0.2 % — SIGNIFICANT CHANGE UP (ref 0–2)
BUN SERPL-MCNC: 9 MG/DL — SIGNIFICANT CHANGE UP (ref 8–20)
CALCIUM SERPL-MCNC: 8.2 MG/DL — LOW (ref 8.6–10.2)
CHLORIDE SERPL-SCNC: 101 MMOL/L — SIGNIFICANT CHANGE UP (ref 98–107)
CO2 SERPL-SCNC: 25 MMOL/L — SIGNIFICANT CHANGE UP (ref 22–29)
CREAT SERPL-MCNC: 0.43 MG/DL — LOW (ref 0.5–1.3)
CULTURE RESULTS: SIGNIFICANT CHANGE UP
EOSINOPHIL # BLD AUTO: 0.31 K/UL — SIGNIFICANT CHANGE UP (ref 0–0.5)
EOSINOPHIL NFR BLD AUTO: 2.9 % — SIGNIFICANT CHANGE UP (ref 0–6)
GLUCOSE BLDC GLUCOMTR-MCNC: 109 MG/DL — HIGH (ref 70–99)
GLUCOSE BLDC GLUCOMTR-MCNC: 78 MG/DL — SIGNIFICANT CHANGE UP (ref 70–99)
GLUCOSE BLDC GLUCOMTR-MCNC: 88 MG/DL — SIGNIFICANT CHANGE UP (ref 70–99)
GLUCOSE SERPL-MCNC: 78 MG/DL — SIGNIFICANT CHANGE UP (ref 70–99)
HCT VFR BLD CALC: 36.8 % — SIGNIFICANT CHANGE UP (ref 34.5–45)
HGB BLD-MCNC: 11.9 G/DL — SIGNIFICANT CHANGE UP (ref 11.5–15.5)
IMM GRANULOCYTES NFR BLD AUTO: 0.7 % — SIGNIFICANT CHANGE UP (ref 0–1.5)
LYMPHOCYTES # BLD AUTO: 1.02 K/UL — SIGNIFICANT CHANGE UP (ref 1–3.3)
LYMPHOCYTES # BLD AUTO: 9.6 % — LOW (ref 13–44)
MAGNESIUM SERPL-MCNC: 1.8 MG/DL — SIGNIFICANT CHANGE UP (ref 1.6–2.6)
MCHC RBC-ENTMCNC: 28.1 PG — SIGNIFICANT CHANGE UP (ref 27–34)
MCHC RBC-ENTMCNC: 32.3 GM/DL — SIGNIFICANT CHANGE UP (ref 32–36)
MCV RBC AUTO: 86.8 FL — SIGNIFICANT CHANGE UP (ref 80–100)
MONOCYTES # BLD AUTO: 1.25 K/UL — HIGH (ref 0–0.9)
MONOCYTES NFR BLD AUTO: 11.7 % — SIGNIFICANT CHANGE UP (ref 2–14)
NEUTROPHILS # BLD AUTO: 8 K/UL — HIGH (ref 1.8–7.4)
NEUTROPHILS NFR BLD AUTO: 74.9 % — SIGNIFICANT CHANGE UP (ref 43–77)
PHOSPHATE SERPL-MCNC: 2.6 MG/DL — SIGNIFICANT CHANGE UP (ref 2.4–4.7)
PLATELET # BLD AUTO: 533 K/UL — HIGH (ref 150–400)
POTASSIUM SERPL-MCNC: 4.2 MMOL/L — SIGNIFICANT CHANGE UP (ref 3.5–5.3)
POTASSIUM SERPL-SCNC: 4.2 MMOL/L — SIGNIFICANT CHANGE UP (ref 3.5–5.3)
RBC # BLD: 4.24 M/UL — SIGNIFICANT CHANGE UP (ref 3.8–5.2)
RBC # FLD: 14.6 % — HIGH (ref 10.3–14.5)
SODIUM SERPL-SCNC: 137 MMOL/L — SIGNIFICANT CHANGE UP (ref 135–145)
SPECIMEN SOURCE: SIGNIFICANT CHANGE UP
WBC # BLD: 10.68 K/UL — HIGH (ref 3.8–10.5)
WBC # FLD AUTO: 10.68 K/UL — HIGH (ref 3.8–10.5)

## 2021-12-16 RX ORDER — MAGNESIUM SULFATE 500 MG/ML
1 VIAL (ML) INJECTION ONCE
Refills: 0 | Status: COMPLETED | OUTPATIENT
Start: 2021-12-16 | End: 2021-12-16

## 2021-12-16 RX ADMIN — PIPERACILLIN AND TAZOBACTAM 25 GRAM(S): 4; .5 INJECTION, POWDER, LYOPHILIZED, FOR SOLUTION INTRAVENOUS at 14:51

## 2021-12-16 RX ADMIN — Medication 1 SPRAY(S): at 17:36

## 2021-12-16 RX ADMIN — LORATADINE 10 MILLIGRAM(S): 10 TABLET ORAL at 12:32

## 2021-12-16 RX ADMIN — Medication 1 SPRAY(S): at 06:11

## 2021-12-16 RX ADMIN — Medication 62.5 MILLIMOLE(S): at 12:33

## 2021-12-16 RX ADMIN — Medication 12.5 MICROGRAM(S): at 06:11

## 2021-12-16 RX ADMIN — Medication 800 MILLIGRAM(S): at 06:12

## 2021-12-16 RX ADMIN — POLYETHYLENE GLYCOL 3350 17 GRAM(S): 17 POWDER, FOR SOLUTION ORAL at 12:33

## 2021-12-16 RX ADMIN — Medication 3 MILLILITER(S): at 20:41

## 2021-12-16 RX ADMIN — Medication 2000 UNIT(S): at 12:32

## 2021-12-16 RX ADMIN — Medication 20 MILLIGRAM(S): at 06:12

## 2021-12-16 RX ADMIN — SODIUM CHLORIDE 100 MILLILITER(S): 9 INJECTION, SOLUTION INTRAVENOUS at 06:11

## 2021-12-16 RX ADMIN — ESCITALOPRAM OXALATE 5 MILLIGRAM(S): 10 TABLET, FILM COATED ORAL at 12:32

## 2021-12-16 RX ADMIN — Medication 800 MILLIGRAM(S): at 17:36

## 2021-12-16 RX ADMIN — ENOXAPARIN SODIUM 40 MILLIGRAM(S): 100 INJECTION SUBCUTANEOUS at 12:33

## 2021-12-16 RX ADMIN — PIPERACILLIN AND TAZOBACTAM 25 GRAM(S): 4; .5 INJECTION, POWDER, LYOPHILIZED, FOR SOLUTION INTRAVENOUS at 21:45

## 2021-12-16 RX ADMIN — Medication 3 MILLILITER(S): at 08:47

## 2021-12-16 RX ADMIN — Medication 1 TABLET(S): at 12:32

## 2021-12-16 RX ADMIN — PIPERACILLIN AND TAZOBACTAM 25 GRAM(S): 4; .5 INJECTION, POWDER, LYOPHILIZED, FOR SOLUTION INTRAVENOUS at 06:11

## 2021-12-16 RX ADMIN — Medication 100 GRAM(S): at 11:28

## 2021-12-16 RX ADMIN — SODIUM CHLORIDE 100 MILLILITER(S): 9 INJECTION, SOLUTION INTRAVENOUS at 18:17

## 2021-12-16 NOTE — CHART NOTE - NSCHARTNOTEFT_GEN_A_CORE
Source: Patient [ ]  Family [ ]   other [x ]    Current Diet: Diet, Pureed:   Moderately Thick Liquids (MODTHICKLIQS) (12-15-21 @ 11:27)    Current Weight:   (12/15) 120.3 lbs  (12/13) 120.3 lbs  (12/8) 109.1 lbs  (11/30) 106.2 lbs  ? accuracy of weights, noted with 1+ generalized and b/l foot edema and 2+ right arm/elbow edema, continue to trend and maintain strict Is&Os     Pertinent Medications: MEDICATIONS  (STANDING):  albuterol/ipratropium for Nebulization 3 milliLiter(s) Nebulizer every 6 hours  cholecalciferol 2000 Unit(s) Oral daily  dextrose 40% Gel 15 Gram(s) Oral once  dextrose 5% + lactated ringers. 1000 milliLiter(s) (100 mL/Hr) IV Continuous <Continuous>  dextrose 5% + sodium chloride 0.9% 1000 milliLiter(s) (100 mL/Hr) IV Continuous <Continuous>  dextrose 5%. 1000 milliLiter(s) (100 mL/Hr) IV Continuous <Continuous>  dextrose 5%. 1000 milliLiter(s) (50 mL/Hr) IV Continuous <Continuous>  dextrose 50% Injectable 25 Gram(s) IV Push once  dextrose 50% Injectable 12.5 Gram(s) IV Push once  dextrose 50% Injectable 25 Gram(s) IV Push once  enoxaparin Injectable 40 milliGRAM(s) SubCutaneous daily  escitalopram 5 milliGRAM(s) Oral daily  fluticasone propionate 50 MICROgram(s)/spray Nasal Spray 1 Spray(s) Both Nostrils every 12 hours  glucagon  Injectable 1 milliGRAM(s) IntraMuscular once  insulin lispro (ADMELOG) corrective regimen sliding scale   SubCutaneous every 6 hours  lactobacillus acidophilus 1 Tablet(s) Oral daily  levothyroxine Injectable 12.5 MICROGram(s) IV Push <User Schedule>  loratadine 10 milliGRAM(s) Oral daily  magnesium sulfate  IVPB 1 Gram(s) IV Intermittent once  mesalamine DR Capsule 800 milliGRAM(s) Oral every 12 hours  piperacillin/tazobactam IVPB.. 3.375 Gram(s) IV Intermittent every 8 hours  polyethylene glycol 3350 17 Gram(s) Oral daily  potassium phosphate / sodium phosphate Powder (PHOS-NaK) 1 Packet(s) Oral every 4 hours  sodium phosphate IVPB 15 milliMole(s) IV Intermittent once  torsemide 20 milliGRAM(s) Oral daily    MEDICATIONS  (PRN):  ALBUTerol    90 MICROgram(s) HFA Inhaler 2 Puff(s) Inhalation every 6 hours PRN Shortness of Breath and/or Wheezing  aluminum hydroxide/magnesium hydroxide/simethicone Suspension 30 milliLiter(s) Oral every 4 hours PRN Dyspepsia  dextrose 50% Injectable 50 milliLiter(s) IV Push once PRN Glucose <70    Pertinent Labs: CBC Full  -  ( 16 Dec 2021 07:18 )  WBC Count : 10.68 K/uL  RBC Count : 4.24 M/uL  Hemoglobin : 11.9 g/dL  Hematocrit : 36.8 %  Platelet Count - Automated : 533 K/uL  Mean Cell Volume : 86.8 fl  Mean Cell Hemoglobin : 28.1 pg  Mean Cell Hemoglobin Concentration : 32.3 gm/dL  Auto Neutrophil # : 8.00 K/uL  Auto Lymphocyte # : 1.02 K/uL  Auto Monocyte # : 1.25 K/uL  Auto Eosinophil # : 0.31 K/uL  Auto Basophil # : 0.02 K/uL  Auto Neutrophil % : 74.9 %  Auto Lymphocyte % : 9.6 %  Auto Monocyte % : 11.7 %  Auto Eosinophil % : 2.9 %  Auto Basophil % : 0.2 %    12-16 Na137 mmol/L Glu 78 mg/dL K+ 4.2 mmol/L Cr  0.43 mg/dL<L> BUN 9.0 mg/dL Phos 2.6 mg/dL Alb n/a   PAB n/a       Skin: Surgical incision to abdomen and IAD per documentation    Nutrition focused physical exam previously conducted - found signs of malnutrition [ ]absent [ x]present    Subcutaneous fat loss: [ x] Orbital fat pads region, [ x]Buccal fat region, [ x]Triceps region,  [ ]Ribs region    Muscle wasting: [ x]Temples region, [x ]Clavicle region, [ ]Shoulder region, [ ]Scapula region, [ ]Interosseous region,  [ ]thigh region, [ ]Calf region    Estimated Needs:   [x ] no change since previous assessment  [ ] recalculated:     Current Nutrition Diagnosis: Pt remains at high nutrition risk secondary to malnutrition (severe, acute on chronic) related to inability to meet sufficient protein-energy in setting of MR, dysphagia, poor skin integrity, 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 Tube feeds discontinued. Pt remains on a pureed diet with moderately thick liquids. Pt stable for discharge per documentation. Last BM 12/16. RD remains available.     Recommendations:   1) Continue pureed diet with moderately thick liquids as tolerated.  2) Add Ensure Enlive TID (moderately thick) to optimize po intake and provide an additional 350 kcal, 20g protein per serving.  3) Rx: MVI and vitamin C 500mg daily.   4) Encourage po intake, monitor diet tolerance, and provide assistance at meals as needed.  5) Obtain daily weights to monitor trends.     Monitoring and Evaluation:   [ x] PO intake [ x] Tolerance to diet prescription [X] Weights  [X] Follow up per protocol [X] Labs.

## 2021-12-16 NOTE — PROGRESS NOTE ADULT - ATTENDING COMMENTS
Patient seen and examined.  No acute events noted overnight.  Colostomy is functioning.  One of the IR drains removed and the one in place has serous output.  White blood cell count is 10.  Plan to discharge home with the IR drain in place as well as with oral antibiotics.  Will need an outpatient follow-up CT scan with contrast prior to drain removal.  Okay to discharge back to [facility] home.

## 2021-12-16 NOTE — PROGRESS NOTE ADULT - SUBJECTIVE AND OBJECTIVE BOX
Acute Care Surgery/Trauma Surgery Progress Note:    Patient evaluated at bedside. No acute distress. No acute events over night. Remains hemodynamically stable and afebrile.   Tolerating tube feeds, Productive ostomy. R abdominal drains with clear, ascitic fluid.        Objective:   ICU Vital Signs Last 24 Hrs  T(C): 36.7 (15 Dec 2021 23:35), Max: 36.9 (15 Dec 2021 11:42)  T(F): 98.1 (15 Dec 2021 23:35), Max: 98.4 (15 Dec 2021 11:42)  HR: 91 (15 Dec 2021 23:35) (70 - 94)  BP: 117/75 (15 Dec 2021 23:35) (109/70 - 119/83)  BP(mean): --  ABP: --  ABP(mean): --  RR: 18 (15 Dec 2021 23:35) (18 - 18)  SpO2: 92% (15 Dec 2021 23:35) (92% - 98%)      Physical Exam:   GEN: patient resting comfortably in bed, in no acute distress  RESP: respirations are unlabored, no accessory muscle use  CVS: RRR  GI: Abdomen soft, non-tender, non-distended, no rebound tenderness / guarding, ostomy to Left hemiabdomen, Drains to Right abdomenx2 with clear ascitic fluid    I&O's Detail    14 Dec 2021 07:01  -  15 Dec 2021 07:00  --------------------------------------------------------  IN:    dextrose 5% + sodium chloride 0.9% w/ Additives: 1100 mL  Total IN: 1100 mL    OUT:    Colostomy (mL): 125 mL    Drain (mL): 175 mL    Drain (mL): 1100 mL    Indwelling Catheter - Urethral (mL): 1050 mL  Total OUT: 2450 mL    Total NET: -1350 mL      15 Dec 2021 07:01  -  16 Dec 2021 01:52  --------------------------------------------------------  IN:  Total IN: 0 mL    OUT:    Colostomy (mL): 300 mL    Drain (mL): 50 mL    Drain (mL): 1160 mL    Indwelling Catheter - Urethral (mL): 550 mL    Voided (mL): 0 mL  Total OUT: 2060 mL    Total NET: -2060 mL                                    11.5   11.91 )-----------( 495      ( 14 Dec 2021 06:12 )             35.5   12-15    137  |  108<H>  |  8.0  ----------------------------<  107<H>  5.2   |  19.0<L>  |  0.32<L>    Ca    8.2<L>      15 Dec 2021 05:19  Phos  2.7     12-15  Mg     2.3     12-15

## 2021-12-17 LAB
ANION GAP SERPL CALC-SCNC: 9 MMOL/L — SIGNIFICANT CHANGE UP (ref 5–17)
BUN SERPL-MCNC: 10.7 MG/DL — SIGNIFICANT CHANGE UP (ref 8–20)
CALCIUM SERPL-MCNC: 7.9 MG/DL — LOW (ref 8.6–10.2)
CHLORIDE SERPL-SCNC: 101 MMOL/L — SIGNIFICANT CHANGE UP (ref 98–107)
CO2 SERPL-SCNC: 26 MMOL/L — SIGNIFICANT CHANGE UP (ref 22–29)
CREAT SERPL-MCNC: 0.4 MG/DL — LOW (ref 0.5–1.3)
CULTURE RESULTS: SIGNIFICANT CHANGE UP
CULTURE RESULTS: SIGNIFICANT CHANGE UP
GLUCOSE SERPL-MCNC: 102 MG/DL — HIGH (ref 70–99)
MAGNESIUM SERPL-MCNC: 2 MG/DL — SIGNIFICANT CHANGE UP (ref 1.6–2.6)
ORGANISM # SPEC MICROSCOPIC CNT: SIGNIFICANT CHANGE UP
ORGANISM # SPEC MICROSCOPIC CNT: SIGNIFICANT CHANGE UP
PHOSPHATE SERPL-MCNC: 2.7 MG/DL — SIGNIFICANT CHANGE UP (ref 2.4–4.7)
POTASSIUM SERPL-MCNC: 3.6 MMOL/L — SIGNIFICANT CHANGE UP (ref 3.5–5.3)
POTASSIUM SERPL-SCNC: 3.6 MMOL/L — SIGNIFICANT CHANGE UP (ref 3.5–5.3)
SODIUM SERPL-SCNC: 136 MMOL/L — SIGNIFICANT CHANGE UP (ref 135–145)
SPECIMEN SOURCE: SIGNIFICANT CHANGE UP
SPECIMEN SOURCE: SIGNIFICANT CHANGE UP

## 2021-12-17 PROCEDURE — 99232 SBSQ HOSP IP/OBS MODERATE 35: CPT

## 2021-12-17 RX ORDER — PIPERACILLIN AND TAZOBACTAM 4; .5 G/20ML; G/20ML
3.38 INJECTION, POWDER, LYOPHILIZED, FOR SOLUTION INTRAVENOUS EVERY 8 HOURS
Refills: 0 | Status: DISCONTINUED | OUTPATIENT
Start: 2021-12-17 | End: 2021-12-20

## 2021-12-17 RX ADMIN — Medication 3 MILLILITER(S): at 08:35

## 2021-12-17 RX ADMIN — Medication 1 TABLET(S): at 17:59

## 2021-12-17 RX ADMIN — ENOXAPARIN SODIUM 40 MILLIGRAM(S): 100 INJECTION SUBCUTANEOUS at 12:02

## 2021-12-17 RX ADMIN — PIPERACILLIN AND TAZOBACTAM 25 GRAM(S): 4; .5 INJECTION, POWDER, LYOPHILIZED, FOR SOLUTION INTRAVENOUS at 05:40

## 2021-12-17 RX ADMIN — Medication 1 SPRAY(S): at 05:41

## 2021-12-17 RX ADMIN — Medication 3 MILLILITER(S): at 16:12

## 2021-12-17 RX ADMIN — PIPERACILLIN AND TAZOBACTAM 25 GRAM(S): 4; .5 INJECTION, POWDER, LYOPHILIZED, FOR SOLUTION INTRAVENOUS at 14:23

## 2021-12-17 RX ADMIN — Medication 12.5 MICROGRAM(S): at 05:41

## 2021-12-17 RX ADMIN — PIPERACILLIN AND TAZOBACTAM 25 GRAM(S): 4; .5 INJECTION, POWDER, LYOPHILIZED, FOR SOLUTION INTRAVENOUS at 21:47

## 2021-12-17 RX ADMIN — POLYETHYLENE GLYCOL 3350 17 GRAM(S): 17 POWDER, FOR SOLUTION ORAL at 12:02

## 2021-12-17 RX ADMIN — Medication 2000 UNIT(S): at 12:02

## 2021-12-17 RX ADMIN — Medication 800 MILLIGRAM(S): at 05:41

## 2021-12-17 RX ADMIN — LORATADINE 10 MILLIGRAM(S): 10 TABLET ORAL at 12:01

## 2021-12-17 RX ADMIN — Medication 800 MILLIGRAM(S): at 18:38

## 2021-12-17 RX ADMIN — ESCITALOPRAM OXALATE 5 MILLIGRAM(S): 10 TABLET, FILM COATED ORAL at 12:02

## 2021-12-17 RX ADMIN — Medication 20 MILLIGRAM(S): at 05:41

## 2021-12-17 RX ADMIN — SODIUM CHLORIDE 100 MILLILITER(S): 9 INJECTION, SOLUTION INTRAVENOUS at 05:40

## 2021-12-17 RX ADMIN — Medication 1 SPRAY(S): at 18:00

## 2021-12-17 NOTE — PROGRESS NOTE ADULT - SUBJECTIVE AND OBJECTIVE BOX
[facility]  INFECTIOUS DISEASES AND INTERNAL MEDICINE at [place]  =======================================================  Jeb Bartlett MD  Diplomates American Board of Internal Medicine and Infectious Diseases  Tel: [phone]      Fax: [phone]  =======================================================    NANCY RAMIREZ [id number]    Follow up: ascites, pneumoperitoneum  afebrile      Allergies:  No Known Allergies           REVIEW OF SYSTEMS:  cannot obtain      Physical Exam:  GEN: NAD, nonverbal  HEENT: normocephalic and atraumatic. EOMI. PERRL.  Anicteric   NECK: Supple.   LUNGS: Clear to auscultation.  HEART: Regular rate and rhythm without murmur.  ABDOMEN: Soft, nontender, and nondistended.  Positive bowel sounds.  rt abdominal IR drain with serous output  : No CVA tenderness  EXTREMITIES: Without any edema.  MSK: no joint swelling    SKIN: No Rash      Vitals:    Vital Signs Last 24 Hrs  T(C): 37 (17 Dec 2021 16:39), Max: 37 (17 Dec 2021 16:39)  T(F): 98.6 (17 Dec 2021 16:39), Max: 98.6 (17 Dec 2021 16:39)  HR: 73 (17 Dec 2021 16:39) (73 - 100)  BP: 120/81 (17 Dec 2021 16:39) (102/78 - 120/81)  BP(mean): --  RR: 17 (17 Dec 2021 16:39) (17 - 20)  SpO2: 97% (17 Dec 2021 16:39) (92% - 98%)    Current Antibiotics:  piperacillin/tazobactam IVPB.. 3.375 Gram(s) IV Intermittent every 8 hours    Other medications:  albuterol/ipratropium for Nebulization 3 milliLiter(s) Nebulizer every 6 hours  cholecalciferol 2000 Unit(s) Oral daily  dextrose 40% Gel 15 Gram(s) Oral once  dextrose 5% + lactated ringers. 1000 milliLiter(s) IV Continuous <Continuous>  dextrose 5% + sodium chloride 0.9% 1000 milliLiter(s) IV Continuous <Continuous>  dextrose 5%. 1000 milliLiter(s) IV Continuous <Continuous>  dextrose 5%. 1000 milliLiter(s) IV Continuous <Continuous>  dextrose 50% Injectable 25 Gram(s) IV Push once  dextrose 50% Injectable 12.5 Gram(s) IV Push once  dextrose 50% Injectable 25 Gram(s) IV Push once  enoxaparin Injectable 40 milliGRAM(s) SubCutaneous daily  escitalopram 5 milliGRAM(s) Oral daily  fluticasone propionate 50 MICROgram(s)/spray Nasal Spray 1 Spray(s) Both Nostrils every 12 hours  glucagon  Injectable 1 milliGRAM(s) IntraMuscular once  insulin lispro (ADMELOG) corrective regimen sliding scale   SubCutaneous every 6 hours  lactobacillus acidophilus 1 Tablet(s) Oral daily  levothyroxine Injectable 12.5 MICROGram(s) IV Push <User Schedule>  loratadine 10 milliGRAM(s) Oral daily  mesalamine DR Capsule 800 milliGRAM(s) Oral every 12 hours  polyethylene glycol 3350 17 Gram(s) Oral daily  potassium phosphate / sodium phosphate Powder (PHOS-NaK) 1 Packet(s) Oral every 4 hours  sodium phosphate IVPB 30 milliMole(s) IV Intermittent once  torsemide 20 milliGRAM(s) Oral daily                                11.9   10.68 )-----------( 533      ( 16 Dec 2021 07:18 )             36.8       12-17    136  |  101  |  10.7  ----------------------------<  102<H>  3.6   |  26.0  |  0.40<L>    Ca    7.9<L>      17 Dec 2021 05:34  Phos  2.7     12-17  Mg     2.0     12-17                              CAPILLARY BLOOD GLUCOSE      POCT Blood Glucose.: 88 mg/dL (16 Dec 2021 17:38)                RECENT CULTURES:  12-12 @ 20:51 Abdominal Fl RUQ Abdominal fluid Enterococcus faecalis    Rare Enterococcus faecalis    polymorphonuclear leukocytes seen  No organisms seen  by cytocentrifuge      12-12 @ 20:48 Abdominal Fl RLQ Abdominal Fluid     No growth    polymorphonuclear leukocytes seen  No organisms seen  by cytocentrifuge      12-11 @ 14:18 .Blood Blood     No growth at 48 hours            WBC Count: 11.91 K/uL (12-14-21 @ 06:12)  WBC Count: 12.67 K/uL (12-13-21 @ 09:33)  WBC Count: 19.29 K/uL (12-12-21 @ 07:09)  WBC Count: 21.59 K/uL (12-11-21 @ 06:31)    Creatinine, Serum: 0.32 mg/dL (12-15-21 @ 05:19)  Creatinine, Serum: 0.36 mg/dL (12-14-21 @ 06:12)  Creatinine, Serum: 0.43 mg/dL (12-13-21 @ 09:33)  Creatinine, Serum: 0.48 mg/dL (12-12-21 @ 07:09)  Creatinine, Serum: 0.49 mg/dL (12-11-21 @ 06:31)               COVID-19 PCR: NotDetec (12-11-21 @ 01:09)  COVID-19 PCR: NotDetec (12-05-21 @ 13:25)  COVID-19 PCR: NotDetec (12-02-21 @ 16:41)  COVID-19 PCR: NotDetec (11-28-21 @ 17:45)

## 2021-12-17 NOTE — PROGRESS NOTE ADULT - ASSESSMENT
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   Due to likelihood of recurrence, surgical intervention was recommended, s/p open sigmoidectomy with ostomy creation.   Placed for nutritional support, incidentally discovered to have a pneumoperitoneum after work up.  CT imaging with IV contrast confirmed pneumoperitoneum and ascites, no evidence of perforated viscus but that assessment could not be ruled out.   Patient with intermittent hypotension and tachycardia repeat CT with worsening pneumoperitoneum and ascites- IR placed two peritoneal drains for persistent ascities, cultures sent + enterococcus which is amp sensitive  BCX (-).   Ascites and likely secondary bacterial peritonitis due to pneumoperitoneum of unclear etiology  afebrile and wbc downtrending  Agree with zosyn-suggest 14 days of abx, can be completed with oral augmentin 875/125 q12h  monitor drain output  suggest repeat Ct oral contrast next week

## 2021-12-17 NOTE — PROGRESS NOTE ADULT - SUBJECTIVE AND OBJECTIVE BOX
INTERVAL HPI/OVERNIGHT EVENTS:    Patient evaluated at bedside. No acute distress. No acute events overnight. Optimized for discharge to facility to continue PO antibiotics as a outpatient.      MEDICATIONS  (STANDING):  albuterol/ipratropium for Nebulization 3 milliLiter(s) Nebulizer every 6 hours  cholecalciferol 2000 Unit(s) Oral daily  dextrose 5% + lactated ringers. 1000 milliLiter(s) (100 mL/Hr) IV Continuous <Continuous>  dextrose 5% + sodium chloride 0.9% 1000 milliLiter(s) (100 mL/Hr) IV Continuous <Continuous>  enoxaparin Injectable 40 milliGRAM(s) SubCutaneous daily  escitalopram 5 milliGRAM(s) Oral daily  fluticasone propionate 50 MICROgram(s)/spray Nasal Spray 1 Spray(s) Both Nostrils every 12 hours  lactobacillus acidophilus 1 Tablet(s) Oral daily  levothyroxine Injectable 12.5 MICROGram(s) IV Push <User Schedule>  loratadine 10 milliGRAM(s) Oral daily  mesalamine DR Capsule 800 milliGRAM(s) Oral every 12 hours  piperacillin/tazobactam IVPB.. 3.375 Gram(s) IV Intermittent every 8 hours  polyethylene glycol 3350 17 Gram(s) Oral daily  potassium phosphate / sodium phosphate Powder (PHOS-NaK) 1 Packet(s) Oral every 4 hours  torsemide 20 milliGRAM(s) Oral daily    MEDICATIONS  (PRN):  ALBUTerol    90 MICROgram(s) HFA Inhaler 2 Puff(s) Inhalation every 6 hours PRN Shortness of Breath and/or Wheezing  aluminum hydroxide/magnesium hydroxide/simethicone Suspension 30 milliLiter(s) Oral every 4 hours PRN Dyspepsia      Vital Signs Last 24 Hrs  T(C): 36.3 (16 Dec 2021 21:48), Max: 36.5 (16 Dec 2021 11:37)  T(F): 97.4 (16 Dec 2021 21:48), Max: 97.7 (16 Dec 2021 11:37)  HR: 96 (16 Dec 2021 21:48) (74 - 96)  BP: 102/78 (16 Dec 2021 21:48) (102/78 - 118/83)  BP(mean): --  RR: 20 (16 Dec 2021 21:48) (18 - 20)  SpO2: 93% (16 Dec 2021 21:48) (93% - 98%)    Physical Exam:   GEN: patient resting comfortably in bed, in no acute distress  RESP: respirations are unlabored, no accessory muscle use  CVS: RRR  GI: Abdomen soft, non-tender, non-distended, no rebound tenderness / guarding, ostomy active, RLQ drain with serous output.      .  LABS:                         11.9   10.68 )-----------( 533      ( 16 Dec 2021 07:18 )             36.8     12-16  I&O's Detail    15 Dec 2021 07:01  -  16 Dec 2021 07:00  --------------------------------------------------------  IN:  Total IN: 0 mL    OUT:    Colostomy (mL): 300 mL    Drain (mL): 1560 mL    Drain (mL): 115 mL    Indwelling Catheter - Urethral (mL): 550 mL    Voided (mL): 600 mL  Total OUT: 3125 mL    Total NET: -3125 mL      16 Dec 2021 07:01  -  17 Dec 2021 01:24  --------------------------------------------------------  IN:  Total IN: 0 mL    OUT:    Drain (mL): 400 mL    Voided (mL): 1650 mL  Total OUT: 2050 mL    Total NET: -2050 mL        137  |  101  |  9.0  ----------------------------<  78  4.2   |  25.0  |  0.43<L>    Ca    8.2<L>      16 Dec 2021 07:18  Phos  2.6     12-16  Mg     1.8     12-16                RADIOLOGY, EKG & ADDITIONAL TESTS: Reviewed.

## 2021-12-17 NOTE — PROGRESS NOTE ADULT - ASSESSMENT
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   Due to likelihood of recurrence, surgical intervention was recommended.   Now s/p open sigmoidectomy with ostomy creation. Patient progressing well with functioning ostomy. NG tube   Placed for nutritional support, incidentally discovered to have a pneumoperitoneum after work up.  CT imaging with IV contrast confirmed pneumoperitoneum and ascites, no evidence of perforated viscus but that assessment could not be ruled out.  She was seen by speech and swallow that recommended pureed diet, moderately thick with supplemental tube feeds.    IR placed two peritoneal drains for persistent ascities, cultures sent.      RUQ drain was removed 12/16.  Patient NG tube removed and placed back on pureed moderately thick     PLAN:    - Pain control MM   - Monitor ostomy o/p   - Monitor drain output   - Pureed moderately thick diet   - Monitor lytes &  replete PRN   - Monitor lower extremity edema and upper extremity temperatures/cyanosis  - Monitor vitals and bolus prn secondary to ascitic output  - F/U Med recs  - FU IR drain cultures   - Continue with Zosyn, patient will continue PO antibiotics on discharge

## 2021-12-17 NOTE — PROGRESS NOTE ADULT - ATTENDING COMMENTS
Patient seen and examined, chart reviewed.  No acute issues overnight.  Tolerating diet, colostomy functional.  On exam patient's abdomen is soft, no obvious tenderness or other signs of peritonitis.  Drain in place Ascites still present but decreasing - 550 cc via remaining drain, still clear.  ID recommendations reviewed - Zosyn while in hospital, can convert to Augmentin outpatient for 14 days total coverage.  Plan for follow up outpatient CT.  Team to discuss drain management education with her home facility.  She is stable for discharge once accepted.  She will need close follow-up with Dr. Montague as outpatient.

## 2021-12-18 LAB
ANION GAP SERPL CALC-SCNC: 11 MMOL/L — SIGNIFICANT CHANGE UP (ref 5–17)
BASOPHILS # BLD AUTO: 0.02 K/UL — SIGNIFICANT CHANGE UP (ref 0–0.2)
BASOPHILS NFR BLD AUTO: 0.2 % — SIGNIFICANT CHANGE UP (ref 0–2)
BUN SERPL-MCNC: 8.6 MG/DL — SIGNIFICANT CHANGE UP (ref 8–20)
CALCIUM SERPL-MCNC: 7.9 MG/DL — LOW (ref 8.6–10.2)
CHLORIDE SERPL-SCNC: 103 MMOL/L — SIGNIFICANT CHANGE UP (ref 98–107)
CO2 SERPL-SCNC: 24 MMOL/L — SIGNIFICANT CHANGE UP (ref 22–29)
CREAT SERPL-MCNC: 0.31 MG/DL — LOW (ref 0.5–1.3)
EOSINOPHIL # BLD AUTO: 0.07 K/UL — SIGNIFICANT CHANGE UP (ref 0–0.5)
EOSINOPHIL NFR BLD AUTO: 0.6 % — SIGNIFICANT CHANGE UP (ref 0–6)
GLUCOSE SERPL-MCNC: 120 MG/DL — HIGH (ref 70–99)
HCT VFR BLD CALC: 37.5 % — SIGNIFICANT CHANGE UP (ref 34.5–45)
HGB BLD-MCNC: 12.3 G/DL — SIGNIFICANT CHANGE UP (ref 11.5–15.5)
IMM GRANULOCYTES NFR BLD AUTO: 0.7 % — SIGNIFICANT CHANGE UP (ref 0–1.5)
LYMPHOCYTES # BLD AUTO: 1.01 K/UL — SIGNIFICANT CHANGE UP (ref 1–3.3)
LYMPHOCYTES # BLD AUTO: 8.8 % — LOW (ref 13–44)
MAGNESIUM SERPL-MCNC: 1.8 MG/DL — SIGNIFICANT CHANGE UP (ref 1.6–2.6)
MCHC RBC-ENTMCNC: 28.1 PG — SIGNIFICANT CHANGE UP (ref 27–34)
MCHC RBC-ENTMCNC: 32.8 GM/DL — SIGNIFICANT CHANGE UP (ref 32–36)
MCV RBC AUTO: 85.8 FL — SIGNIFICANT CHANGE UP (ref 80–100)
MONOCYTES # BLD AUTO: 0.95 K/UL — HIGH (ref 0–0.9)
MONOCYTES NFR BLD AUTO: 8.2 % — SIGNIFICANT CHANGE UP (ref 2–14)
NEUTROPHILS # BLD AUTO: 9.39 K/UL — HIGH (ref 1.8–7.4)
NEUTROPHILS NFR BLD AUTO: 81.5 % — HIGH (ref 43–77)
PHOSPHATE SERPL-MCNC: 1.9 MG/DL — LOW (ref 2.4–4.7)
PLATELET # BLD AUTO: 458 K/UL — HIGH (ref 150–400)
POTASSIUM SERPL-MCNC: 4.3 MMOL/L — SIGNIFICANT CHANGE UP (ref 3.5–5.3)
POTASSIUM SERPL-SCNC: 4.3 MMOL/L — SIGNIFICANT CHANGE UP (ref 3.5–5.3)
RBC # BLD: 4.37 M/UL — SIGNIFICANT CHANGE UP (ref 3.8–5.2)
RBC # FLD: 14.7 % — HIGH (ref 10.3–14.5)
SODIUM SERPL-SCNC: 138 MMOL/L — SIGNIFICANT CHANGE UP (ref 135–145)
WBC # BLD: 11.52 K/UL — HIGH (ref 3.8–10.5)
WBC # FLD AUTO: 11.52 K/UL — HIGH (ref 3.8–10.5)

## 2021-12-18 RX ORDER — MAGNESIUM SULFATE 500 MG/ML
2 VIAL (ML) INJECTION ONCE
Refills: 0 | Status: COMPLETED | OUTPATIENT
Start: 2021-12-18 | End: 2021-12-18

## 2021-12-18 RX ORDER — SODIUM,POTASSIUM PHOSPHATES 278-250MG
1 POWDER IN PACKET (EA) ORAL THREE TIMES A DAY
Refills: 0 | Status: COMPLETED | OUTPATIENT
Start: 2021-12-18 | End: 2021-12-19

## 2021-12-18 RX ADMIN — Medication 25 GRAM(S): at 15:27

## 2021-12-18 RX ADMIN — Medication 1 SPRAY(S): at 04:39

## 2021-12-18 RX ADMIN — Medication 12.5 MICROGRAM(S): at 04:40

## 2021-12-18 RX ADMIN — POLYETHYLENE GLYCOL 3350 17 GRAM(S): 17 POWDER, FOR SOLUTION ORAL at 11:10

## 2021-12-18 RX ADMIN — LORATADINE 10 MILLIGRAM(S): 10 TABLET ORAL at 11:15

## 2021-12-18 RX ADMIN — ESCITALOPRAM OXALATE 5 MILLIGRAM(S): 10 TABLET, FILM COATED ORAL at 11:09

## 2021-12-18 RX ADMIN — Medication 2000 UNIT(S): at 11:10

## 2021-12-18 RX ADMIN — Medication 800 MILLIGRAM(S): at 17:03

## 2021-12-18 RX ADMIN — Medication 20 MILLIGRAM(S): at 04:31

## 2021-12-18 RX ADMIN — Medication 800 MILLIGRAM(S): at 04:31

## 2021-12-18 RX ADMIN — ENOXAPARIN SODIUM 40 MILLIGRAM(S): 100 INJECTION SUBCUTANEOUS at 11:10

## 2021-12-18 RX ADMIN — PIPERACILLIN AND TAZOBACTAM 25 GRAM(S): 4; .5 INJECTION, POWDER, LYOPHILIZED, FOR SOLUTION INTRAVENOUS at 22:52

## 2021-12-18 RX ADMIN — Medication 3 MILLILITER(S): at 23:05

## 2021-12-18 RX ADMIN — Medication 1 TABLET(S): at 11:09

## 2021-12-18 RX ADMIN — Medication 1 SPRAY(S): at 17:02

## 2021-12-18 RX ADMIN — PIPERACILLIN AND TAZOBACTAM 25 GRAM(S): 4; .5 INJECTION, POWDER, LYOPHILIZED, FOR SOLUTION INTRAVENOUS at 13:24

## 2021-12-18 RX ADMIN — PIPERACILLIN AND TAZOBACTAM 25 GRAM(S): 4; .5 INJECTION, POWDER, LYOPHILIZED, FOR SOLUTION INTRAVENOUS at 04:32

## 2021-12-18 RX ADMIN — Medication 3 MILLILITER(S): at 16:41

## 2021-12-18 RX ADMIN — SODIUM CHLORIDE 100 MILLILITER(S): 9 INJECTION, SOLUTION INTRAVENOUS at 22:53

## 2021-12-18 RX ADMIN — Medication 1 PACKET(S): at 22:57

## 2021-12-18 NOTE — PROGRESS NOTE ADULT - ATTENDING COMMENTS
No significant changes over last 24 hours.  Exam is stable.  Patient's facility is not able to accept her back until Monday due to staffing shortages - will plan for discharge on Monday.

## 2021-12-18 NOTE — PROGRESS NOTE ADULT - ASSESSMENT
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   Due to likelihood of recurrence, surgical intervention was recommended.   Now s/p open sigmoidectomy with ostomy creation. Patient progressing well with functioning ostomy. NG tube   Placed for nutritional support, incidentally discovered to have a pneumoperitoneum after work up.  CT imaging with IV contrast confirmed pneumoperitoneum and ascites, no evidence of perforated viscus but that assessment could not be ruled out.  She was seen by speech and swallow that recommended pureed diet, moderately thick with supplemental tube feeds.    IR placed two peritoneal drains for persistent ascities, cultures sent.      RUQ drain was removed 12/16.  Patient NG tube removed and placed back on pureed moderately thick     PLAN:    - Pending transport to facility; approved by CM  - Pain control MM   - Monitor ostomy o/p   - Pureed moderately thick diet   - Monitor lytes &  replete PRN   - Appreciate Med recs  - FU IR drain cultures   - Continue with Zosyn, patient will continue PO antibiotics on discharge

## 2021-12-18 NOTE — PROGRESS NOTE ADULT - SUBJECTIVE AND OBJECTIVE BOX
Surgery Progress Note:    No acute overnight events. Patient afebrile, VSS. Pain well controlled. Tolerating pureed diet. No n/v/f/c/cp/sob. Ostomy functioning.  Patient authorized to leave to facility, pending transport.     Diet, Pureed:   Moderately Thick Liquids (MODTHICKLIQS) (12-15-21 @ 11:27)      Scheduled Medications:   albuterol/ipratropium for Nebulization 3 milliLiter(s) Nebulizer every 6 hours  cholecalciferol 2000 Unit(s) Oral daily  dextrose 5% + lactated ringers. 1000 milliLiter(s) (100 mL/Hr) IV Continuous <Continuous>  dextrose 5% + sodium chloride 0.9% 1000 milliLiter(s) (100 mL/Hr) IV Continuous <Continuous>  enoxaparin Injectable 40 milliGRAM(s) SubCutaneous daily  escitalopram 5 milliGRAM(s) Oral daily  fluticasone propionate 50 MICROgram(s)/spray Nasal Spray 1 Spray(s) Both Nostrils every 12 hours  lactobacillus acidophilus 1 Tablet(s) Oral daily  levothyroxine Injectable 12.5 MICROGram(s) IV Push <User Schedule>  loratadine 10 milliGRAM(s) Oral daily  mesalamine DR Capsule 800 milliGRAM(s) Oral every 12 hours  piperacillin/tazobactam IVPB.. 3.375 Gram(s) IV Intermittent every 8 hours  polyethylene glycol 3350 17 Gram(s) Oral daily  potassium phosphate / sodium phosphate Powder (PHOS-NaK) 1 Packet(s) Oral every 4 hours  torsemide 20 milliGRAM(s) Oral daily    PRN Medications:  ALBUTerol    90 MICROgram(s) HFA Inhaler 2 Puff(s) Inhalation every 6 hours PRN Shortness of Breath and/or Wheezing  aluminum hydroxide/magnesium hydroxide/simethicone Suspension 30 milliLiter(s) Oral every 4 hours PRN Dyspepsia      Objective:   T(F): 98.6 (12-17 @ 20:47), Max: 98.6 (12-17 @ 16:39)  HR: 98 (12-17 @ 20:47) (73 - 100)  BP: 123/79 (12-17 @ 20:47) (112/68 - 123/79)  BP(mean): --  ABP: --  ABP(mean): --  RR: 17 (12-17 @ 20:47) (17 - 20)  SpO2: 90% (12-17 @ 20:47) (90% - 97%)      Physical Exam:   GEN: patient resting comfortably in bed, nonverbal per baseline  RESP: respirations are unlabored, no accessory muscle use  CVS: RRR  GI: Abdomen soft, non-tender, non-distended, no rebound tenderness / guarding, ostomy with pink and patient stoma  MSK: contracted per baseline    I&O's    12-16 @ 07:01  -  12-17 @ 07:00  --------------------------------------------------------  IN:    dextrose 5% + sodium chloride 0.9% w/ Additives: 1200 mL    IV PiggyBack: 200 mL  Total IN: 1400 mL    OUT:    Drain (mL): 550 mL    Voided (mL): 1650 mL  Total OUT: 2200 mL    Total NET: -800 mL      12-17 @ 07:01  -  12-18 @ 04:00  --------------------------------------------------------  IN:    dextrose 5% + sodium chloride 0.9% w/ Additives: 800 mL    IV PiggyBack: 100 mL  Total IN: 900 mL    OUT:    Drain (mL): 300 mL    Voided (mL): 2050 mL  Total OUT: 2350 mL    Total NET: -1450 mL          LABS:                        11.9   10.68 )-----------( 533      ( 16 Dec 2021 07:18 )             36.8     12-17    136  |  101  |  10.7  ----------------------------<  102<H>  3.6   |  26.0  |  0.40<L>    Ca    7.9<L>      17 Dec 2021 05:34  Phos  2.7     12-17  Mg     2.0     12-17            MICROBIOLOGY:     Culture - Body Fluid with Gram Stain (collected 12-12 @ 20:51)  Source: Abdominal Fl RUQ Abdominal fluid  Gram Stain (12-12 @ 23:28):    polymorphonuclear leukocytes seen    No organisms seen    by cytocentrifuge  Final Report (12-17 @ 16:28):    Rare Enterococcus faecalis  Organism: Enterococcus faecalis (12-17 @ 16:28)  Organism: Enterococcus faecalis (12-17 @ 16:28)      -  Ampicillin: S <=2 Predicts results to ampicillin/sulbactam, amoxacillin-clavulanate and  piperacillin-tazobactam.      -  Tetra/Doxy: R >8      -  Vancomycin: S 2      Method Type: SEBASTIAN    Culture - Body Fluid with Gram Stain (collected 12-12 @ 20:48)  Source: Abdominal Fl RLQ Abdominal Fluid  Gram Stain (12-12 @ 23:27):    polymorphonuclear leukocytes seen    No organisms seen    by cytocentrifuge  Final Report (12-17 @ 15:47):    No growth at 5 days    Culture - Blood (collected 12-11 @ 14:18)  Source: .Blood Blood  Final Report (12-16 @ 15:01):    No growth at 5 days.        PATHOLOGY:

## 2021-12-19 LAB
ANION GAP SERPL CALC-SCNC: 10 MMOL/L — SIGNIFICANT CHANGE UP (ref 5–17)
BASOPHILS # BLD AUTO: 0.02 K/UL — SIGNIFICANT CHANGE UP (ref 0–0.2)
BASOPHILS NFR BLD AUTO: 0.1 % — SIGNIFICANT CHANGE UP (ref 0–2)
BUN SERPL-MCNC: 9.4 MG/DL — SIGNIFICANT CHANGE UP (ref 8–20)
CALCIUM SERPL-MCNC: 8.1 MG/DL — LOW (ref 8.6–10.2)
CHLORIDE SERPL-SCNC: 101 MMOL/L — SIGNIFICANT CHANGE UP (ref 98–107)
CO2 SERPL-SCNC: 24 MMOL/L — SIGNIFICANT CHANGE UP (ref 22–29)
CREAT SERPL-MCNC: 0.4 MG/DL — LOW (ref 0.5–1.3)
EOSINOPHIL # BLD AUTO: 0.17 K/UL — SIGNIFICANT CHANGE UP (ref 0–0.5)
EOSINOPHIL NFR BLD AUTO: 1.3 % — SIGNIFICANT CHANGE UP (ref 0–6)
GLUCOSE SERPL-MCNC: 93 MG/DL — SIGNIFICANT CHANGE UP (ref 70–99)
HCT VFR BLD CALC: 36.5 % — SIGNIFICANT CHANGE UP (ref 34.5–45)
HGB BLD-MCNC: 11.8 G/DL — SIGNIFICANT CHANGE UP (ref 11.5–15.5)
IMM GRANULOCYTES NFR BLD AUTO: 0.7 % — SIGNIFICANT CHANGE UP (ref 0–1.5)
LYMPHOCYTES # BLD AUTO: 0.97 K/UL — LOW (ref 1–3.3)
LYMPHOCYTES # BLD AUTO: 7.1 % — LOW (ref 13–44)
MAGNESIUM SERPL-MCNC: 1.9 MG/DL — SIGNIFICANT CHANGE UP (ref 1.6–2.6)
MCHC RBC-ENTMCNC: 28.3 PG — SIGNIFICANT CHANGE UP (ref 27–34)
MCHC RBC-ENTMCNC: 32.3 GM/DL — SIGNIFICANT CHANGE UP (ref 32–36)
MCV RBC AUTO: 87.5 FL — SIGNIFICANT CHANGE UP (ref 80–100)
MONOCYTES # BLD AUTO: 1.15 K/UL — HIGH (ref 0–0.9)
MONOCYTES NFR BLD AUTO: 8.5 % — SIGNIFICANT CHANGE UP (ref 2–14)
NEUTROPHILS # BLD AUTO: 11.17 K/UL — HIGH (ref 1.8–7.4)
NEUTROPHILS NFR BLD AUTO: 82.3 % — HIGH (ref 43–77)
PHOSPHATE SERPL-MCNC: 2.3 MG/DL — LOW (ref 2.4–4.7)
PLATELET # BLD AUTO: 475 K/UL — HIGH (ref 150–400)
POTASSIUM SERPL-MCNC: 4.4 MMOL/L — SIGNIFICANT CHANGE UP (ref 3.5–5.3)
POTASSIUM SERPL-SCNC: 4.4 MMOL/L — SIGNIFICANT CHANGE UP (ref 3.5–5.3)
RBC # BLD: 4.17 M/UL — SIGNIFICANT CHANGE UP (ref 3.8–5.2)
RBC # FLD: 14.6 % — HIGH (ref 10.3–14.5)
SODIUM SERPL-SCNC: 135 MMOL/L — SIGNIFICANT CHANGE UP (ref 135–145)
WBC # BLD: 13.58 K/UL — HIGH (ref 3.8–10.5)
WBC # FLD AUTO: 13.58 K/UL — HIGH (ref 3.8–10.5)

## 2021-12-19 RX ORDER — MAGNESIUM SULFATE 500 MG/ML
2 VIAL (ML) INJECTION ONCE
Refills: 0 | Status: COMPLETED | OUTPATIENT
Start: 2021-12-19 | End: 2021-12-19

## 2021-12-19 RX ORDER — IPRATROPIUM/ALBUTEROL SULFATE 18-103MCG
3 AEROSOL WITH ADAPTER (GRAM) INHALATION EVERY 6 HOURS
Refills: 0 | Status: DISCONTINUED | OUTPATIENT
Start: 2021-12-19 | End: 2021-12-20

## 2021-12-19 RX ORDER — SODIUM,POTASSIUM PHOSPHATES 278-250MG
1 POWDER IN PACKET (EA) ORAL ONCE
Refills: 0 | Status: COMPLETED | OUTPATIENT
Start: 2021-12-19 | End: 2021-12-20

## 2021-12-19 RX ADMIN — PIPERACILLIN AND TAZOBACTAM 25 GRAM(S): 4; .5 INJECTION, POWDER, LYOPHILIZED, FOR SOLUTION INTRAVENOUS at 13:21

## 2021-12-19 RX ADMIN — Medication 3 MILLILITER(S): at 04:11

## 2021-12-19 RX ADMIN — Medication 1 SPRAY(S): at 17:46

## 2021-12-19 RX ADMIN — Medication 1 SPRAY(S): at 06:22

## 2021-12-19 RX ADMIN — Medication 800 MILLIGRAM(S): at 06:23

## 2021-12-19 RX ADMIN — Medication 20 MILLIGRAM(S): at 06:23

## 2021-12-19 RX ADMIN — Medication 12.5 MICROGRAM(S): at 06:20

## 2021-12-19 RX ADMIN — PIPERACILLIN AND TAZOBACTAM 25 GRAM(S): 4; .5 INJECTION, POWDER, LYOPHILIZED, FOR SOLUTION INTRAVENOUS at 23:06

## 2021-12-19 RX ADMIN — Medication 800 MILLIGRAM(S): at 17:46

## 2021-12-19 RX ADMIN — Medication 1 PACKET(S): at 13:40

## 2021-12-19 RX ADMIN — SODIUM CHLORIDE 100 MILLILITER(S): 9 INJECTION, SOLUTION INTRAVENOUS at 09:05

## 2021-12-19 RX ADMIN — Medication 1 TABLET(S): at 11:19

## 2021-12-19 RX ADMIN — SODIUM CHLORIDE 100 MILLILITER(S): 9 INJECTION, SOLUTION INTRAVENOUS at 06:14

## 2021-12-19 RX ADMIN — ESCITALOPRAM OXALATE 5 MILLIGRAM(S): 10 TABLET, FILM COATED ORAL at 11:19

## 2021-12-19 RX ADMIN — Medication 25 GRAM(S): at 09:01

## 2021-12-19 RX ADMIN — Medication 1 PACKET(S): at 06:27

## 2021-12-19 RX ADMIN — ENOXAPARIN SODIUM 40 MILLIGRAM(S): 100 INJECTION SUBCUTANEOUS at 11:19

## 2021-12-19 RX ADMIN — LORATADINE 10 MILLIGRAM(S): 10 TABLET ORAL at 11:19

## 2021-12-19 RX ADMIN — POLYETHYLENE GLYCOL 3350 17 GRAM(S): 17 POWDER, FOR SOLUTION ORAL at 11:19

## 2021-12-19 RX ADMIN — Medication 2000 UNIT(S): at 11:19

## 2021-12-19 RX ADMIN — PIPERACILLIN AND TAZOBACTAM 25 GRAM(S): 4; .5 INJECTION, POWDER, LYOPHILIZED, FOR SOLUTION INTRAVENOUS at 06:14

## 2021-12-19 NOTE — PROGRESS NOTE ADULT - SUBJECTIVE AND OBJECTIVE BOX
HPI/OVERNIGHT EVENTS: Patient seen and examined at bedside this AM. No overnight events. No complaints. Denies fever, chills, nausea, vomiting, chest pain, SOB, dizziness, abd pain or any other concerning symptoms.    Vital Signs Last 24 Hrs  T(C): 36.6 (19 Dec 2021 00:48), Max: 36.8 (18 Dec 2021 15:05)  T(F): 97.9 (19 Dec 2021 00:48), Max: 98.2 (18 Dec 2021 15:05)  HR: 98 (19 Dec 2021 00:48) (77 - 98)  BP: 140/76 (19 Dec 2021 00:48) (91/70 - 140/76)  BP(mean): --  RR: 18 (19 Dec 2021 00:48) (18 - 18)  SpO2: 97% (19 Dec 2021 00:48) (96% - 98%)    I&O's Detail    17 Dec 2021 07:01  -  18 Dec 2021 07:00  --------------------------------------------------------  IN:    dextrose 5% + sodium chloride 0.9% w/ Additives: 1100 mL    IV PiggyBack: 200 mL  Total IN: 1300 mL    OUT:    Colostomy (mL): 400 mL    Drain (mL): 700 mL    Voided (mL): 2050 mL  Total OUT: 3150 mL    Total NET: -1850 mL      18 Dec 2021 07:01  -  19 Dec 2021 02:34  --------------------------------------------------------  IN:    dextrose 5% + sodium chloride 0.9% w/ Additives: 1400 mL    IV PiggyBack: 100 mL    IV PiggyBack: 50 mL  Total IN: 1550 mL    OUT:    Colostomy (mL): 150 mL    Drain (mL): 675 mL    Voided (mL): 1500 mL  Total OUT: 2325 mL    Total NET: -775 mL        Physical Exam:   GEN: patient resting comfortably in bed, nonverbal per baseline  RESP: respirations are unlabored, no accessory muscle use  CVS: RRR  GI: Abdomen soft, non-tender, non-distended, no rebound tenderness / guarding, ostomy with pink and patient stoma  MSK: contracted per baseline  LABS:                        12.3   11.52 )-----------( 458      ( 18 Dec 2021 07:37 )             37.5     12-18    138  |  103  |  8.6  ----------------------------<  120<H>  4.3   |  24.0  |  0.31<L>    Ca    7.9<L>      18 Dec 2021 07:37  Phos  1.9     12-18  Mg     1.8     12-18            MEDICATIONS  (STANDING):  albuterol/ipratropium for Nebulization 3 milliLiter(s) Nebulizer every 6 hours  cholecalciferol 2000 Unit(s) Oral daily  dextrose 5% + lactated ringers. 1000 milliLiter(s) (100 mL/Hr) IV Continuous <Continuous>  dextrose 5% + sodium chloride 0.9% 1000 milliLiter(s) (100 mL/Hr) IV Continuous <Continuous>  enoxaparin Injectable 40 milliGRAM(s) SubCutaneous daily  escitalopram 5 milliGRAM(s) Oral daily  fluticasone propionate 50 MICROgram(s)/spray Nasal Spray 1 Spray(s) Both Nostrils every 12 hours  lactobacillus acidophilus 1 Tablet(s) Oral daily  levothyroxine Injectable 12.5 MICROGram(s) IV Push <User Schedule>  loratadine 10 milliGRAM(s) Oral daily  mesalamine DR Capsule 800 milliGRAM(s) Oral every 12 hours  piperacillin/tazobactam IVPB.. 3.375 Gram(s) IV Intermittent every 8 hours  polyethylene glycol 3350 17 Gram(s) Oral daily  potassium phosphate / sodium phosphate Powder (PHOS-NaK) 1 Packet(s) Oral every 4 hours  potassium phosphate / sodium phosphate Powder (PHOS-NaK) 1 Packet(s) Oral three times a day  torsemide 20 milliGRAM(s) Oral daily    MEDICATIONS  (PRN):  ALBUTerol    90 MICROgram(s) HFA Inhaler 2 Puff(s) Inhalation every 6 hours PRN Shortness of Breath and/or Wheezing  aluminum hydroxide/magnesium hydroxide/simethicone Suspension 30 milliLiter(s) Oral every 4 hours PRN Dyspepsia

## 2021-12-19 NOTE — PROGRESS NOTE ADULT - ASSESSMENT
SUBJECTIVE:   Bjorn Sykes is a 49 year old female who presents to clinic today for the following health issues:      ENT Symptoms             Symptoms: cc Present Absent Comment   Fever/Chills  x  Chills    Fatigue  x     Muscle Aches  x  Mild    Eye Irritation  x  Itchy    Sneezing  x     Nasal Christopher/Drg  x     Sinus Pressure/Pain  x  Mild    Loss of smell   x    Dental pain   x    Sore Throat  x     Swollen Glands   x    Ear Pain/Fullness  x  Both ears, pain and pressure    Cough  x     Wheeze   x    Chest Pain   x    Shortness of breath   x    Rash   x    Other  x  Headache, Nausea      Symptom duration:  Last night    Symptom severity:  moderate    Treatments tried:  Claritin, Ibuprofen     Contacts:  None       Feeling well all day yesterday until last night - hit her suddenly  + body aches, chills, headaches  Did not take her temp (no thermometer)  Cough  Mild nausea/upset stomach  Stools a little looser than normal but not diarrhea  Throat is very painful    Problem list and histories reviewed & adjusted, as indicated.  Additional history: as documented    Current Outpatient Prescriptions   Medication Sig Dispense Refill     albuterol (PROAIR HFA/PROVENTIL HFA/VENTOLIN HFA) 108 (90 BASE) MCG/ACT Inhaler Inhale 2 puffs into the lungs every 4 hours as needed for shortness of breath / dyspnea 1 Inhaler 2     mometasone (ELOCON) 0.1 % cream Apply topically daily as needed Apply 1 dose topically daily as needed 45 g 3     mometasone (NASONEX) 50 MCG/ACT spray Spray 2 sprays into both nostrils daily 3 Box 4     ketotifen (ZADITOR/REFRESH ANTI-ITCH) 0.025 % SOLN ophthalmic solution Place 1 drop into both eyes 2 times daily 1 Bottle 11     olopatadine HCl (PATADAY) 0.2 % SOLN Place 1 drop into both eyes daily 1 Bottle 11     cetirizine (ZYRTEC) 10 MG tablet Take 1 tablet (10 mg) by mouth daily 90 tablet 3     Calcium Carbonate-Vitamin D (CALCIUM 500 + D PO) Take by mouth 2 times daily        "Glucosamine-Chondroit-Vit C-Mn (GLUCOSAMINE CHONDR 1500 COMPLX PO) Take  by mouth 2 times daily.       ascorbic acid (VITAMIN C) 500 MG tablet Take 500 mg by mouth as needed.       IBUPROFEN PO Take  by mouth as needed.       MULTIVITAMIN OR once daily       Allergies   Allergen Reactions     Flagyl [Imidazole Antifungals] Rash     Sulfa Drugs Rash       Reviewed and updated as needed this visit by clinical staff       Reviewed and updated as needed this visit by Provider         ROS:  Remainder of ROS obtained and found to be negative other than that which was documented above      OBJECTIVE:     /78  Pulse 82  Temp 99.4  F (37.4  C) (Tympanic)  Ht 5' 4.5\" (1.638 m)  Wt 163 lb (73.9 kg)  LMP 07/05/2009  SpO2 98%  BMI 27.55 kg/m2  Body mass index is 27.55 kg/(m^2).  GENERAL: healthy, alert and no distress  EYES: Eyes grossly normal to inspection  HENT: ear canals and TM's normal, nose and mouth without ulcers or lesions  NECK: no adenopathy  RESP: lungs clear to auscultation - no rales, rhonchi or wheezes  CV: regular rates and rhythm, normal S1 S2, no S3 or S4 and no murmur, click or rub    Diagnostic Test Results:  Strep screen - Negative    ASSESSMENT/PLAN:       ICD-10-CM    1. Influenza-like illness R69 oseltamivir (TAMIFLU) 75 MG capsule     Beta strep group A culture   2. Throat pain R07.0 Strep, Rapid Screen     Beta strep group A culture     Suspect influenza based upon symptoms and onset of symptoms. Discussed tamiflu - its effectiveness as well as potential side effects. 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   Due to likelihood of recurrence, surgical intervention was recommended.   Now s/p open sigmoidectomy with ostomy creation. Patient progressing well with functioning ostomy. NG tube   Placed for nutritional support, incidentally discovered to have a pneumoperitoneum after work up.  CT imaging with IV contrast confirmed pneumoperitoneum and ascites, no evidence of perforated viscus but that assessment could not be ruled out.  She was seen by speech and swallow that recommended pureed diet, moderately thick with supplemental tube feeds.    IR placed two peritoneal drains for persistent ascities, cultures sent.      RUQ drain was removed 12/16.  Patient NG tube removed and placed back on pureed moderately thick     PLAN:    - Pending transport to facility; approved by CM  - Pain control MM   - Monitor ostomy o/p   - Pureed moderately thick diet   - Monitor lytes &  replete PRN   - Appreciate Med recs  - FU IR drain cultures   - Continue with Zosyn, patient will continue PO antibiotics on discharge

## 2021-12-19 NOTE — PROGRESS NOTE ADULT - ATTENDING COMMENTS
Patient remains afebrile and vital signs within normal limits; however her WBC did slightly increase to 13 today.  Drain character remains unchanged and her ostomy continues to function.  Wound appears C/D/I without erythema, discharge, or any obvious sign of infection.  She is on Zosyn at this time for secondary bacterial peritonitis noted on her drain cultures.  If WBC continues to increase tomorrow or if her vitals display any abnormalities such as fever, tachycardia even today, she may benefit from another CT scan.

## 2021-12-19 NOTE — PROGRESS NOTE ADULT - NUTRITIONAL ASSESSMENT
This patient has been assessed with a concern for Malnutrition and has been determined to have a diagnosis/diagnoses of Severe protein-calorie malnutrition.    This patient is being managed with:   Diet NPO with Tube Feed-  Tube Feeding Modality: Nasogastric  Pivot 1.5 [place] (PIVOT1.5RTH)  Total Volume for 24 Hours (mL): 960  Continuous  Starting Tube Feed Rate {mL per Hour}: 20  Increase Tube Feed Rate by (mL): 10     Every 2 hours  Until Goal Tube Feed Rate (mL per Hour): 40  Tube Feed Duration (in Hours): 24  Tube Feed Start Time: 14:40  Entered: Dec 13 2021  2:37PM    
This patient has been assessed with a concern for Malnutrition and has been determined to have a diagnosis/diagnoses of Severe protein-calorie malnutrition.    This patient is being managed with:   Diet Pureed-  Moderately Thick Liquids (MODTHICKLIQS)  Tube Feeding Modality: Nasogastric  Pivot 1.5 [place] (PIVOT1.5RTH)  Total Volume for 24 Hours (mL): 720  Continuous  Starting Tube Feed Rate {mL per Hour}: 10  Until Goal Tube Feed Rate (mL per Hour): 30  Tube Feed Duration (in Hours): 24  Tube Feed Start Time: 13:30  Free Water Flush   Total Volume per Flush (mL): 150   Frequency: Every 4 Hours   Total Daily Volume of Flush (mL): 900    Start Time: 13:30  Entered: Dec  9 2021 12:59PM    
This patient has been assessed with a concern for Malnutrition and has been determined to have a diagnosis/diagnoses of Severe protein-calorie malnutrition.    This patient is being managed with:   Diet Pureed-  Moderately Thick Liquids (MODTHICKLIQS)  Tube Feeding Modality: Nasogastric  Pivot 1.5 [place] (PIVOT1.5RTH)  Total Volume for 24 Hours (mL): 720  Continuous  Starting Tube Feed Rate {mL per Hour}: 10  Until Goal Tube Feed Rate (mL per Hour): 30  Tube Feed Duration (in Hours): 24  Tube Feed Start Time: 13:30  Free Water Flush   Total Volume per Flush (mL): 150   Frequency: Every 4 Hours   Total Daily Volume of Flush (mL): 900    Start Time: 13:30  Entered: Dec 13 2021 11:01AM    
This patient has been assessed with a concern for Malnutrition and has been determined to have a diagnosis/diagnoses of Severe protein-calorie malnutrition.    This patient is being managed with:   Diet NPO with Tube Feed-  Tube Feeding Modality: Nasogastric  Pivot 1.5 [place] (PIVOT1.5RTH)  Total Volume for 24 Hours (mL): 960  Continuous  Starting Tube Feed Rate {mL per Hour}: 20  Increase Tube Feed Rate by (mL): 10     Every 2 hours  Until Goal Tube Feed Rate (mL per Hour): 40  Tube Feed Duration (in Hours): 24  Tube Feed Start Time: 14:40  Entered: Dec 13 2021  2:37PM    
This patient has been assessed with a concern for Malnutrition and has been determined to have a diagnosis/diagnoses of Severe protein-calorie malnutrition.    This patient is being managed with:   Diet NPO with Tube Feed-  Tube Feeding Modality: Nasogastric  Pivot 1.5 [place] (PIVOT1.5RTH)  Total Volume for 24 Hours (mL): 960  Continuous  Starting Tube Feed Rate {mL per Hour}: 20  Increase Tube Feed Rate by (mL): 10     Every 2 hours  Until Goal Tube Feed Rate (mL per Hour): 40  Tube Feed Duration (in Hours): 24  Tube Feed Start Time: 14:40  Entered: Dec 13 2021  2:37PM    
This patient has been assessed with a concern for Malnutrition and has been determined to have a diagnosis/diagnoses of Severe protein-calorie malnutrition.    This patient is being managed with:   Diet NPO-  Entered: Dec  6 2021  6:36PM    
This patient has been assessed with a concern for Malnutrition and has been determined to have a diagnosis/diagnoses of Severe protein-calorie malnutrition.    This patient is being managed with:   Diet Pureed-  Mildly Thick Liquids (MILDTHICKLIQS)  Entered: Dec  3 2021 11:26AM    
This patient has been assessed with a concern for Malnutrition and has been determined to have a diagnosis/diagnoses of Severe protein-calorie malnutrition.    This patient is being managed with:   Diet Pureed-  Moderately Thick Liquids (MODTHICKLIQS)  Entered: Dec 15 2021 11:27AM    
This patient has been assessed with a concern for Malnutrition and has been determined to have a diagnosis/diagnoses of Severe protein-calorie malnutrition.    This patient is being managed with:   Diet Pureed-  Moderately Thick Liquids (MODTHICKLIQS)  Tube Feeding Modality: Nasogastric  Pivot 1.5 [place] (PIVOT1.5RTH)  Total Volume for 24 Hours (mL): 720  Continuous  Starting Tube Feed Rate {mL per Hour}: 10  Until Goal Tube Feed Rate (mL per Hour): 30  Tube Feed Duration (in Hours): 24  Tube Feed Start Time: 13:30  Free Water Flush   Total Volume per Flush (mL): 150   Frequency: Every 4 Hours   Total Daily Volume of Flush (mL): 900    Start Time: 13:30  Entered: Dec  9 2021 12:59PM    
This patient has been assessed with a concern for Malnutrition and has been determined to have a diagnosis/diagnoses of Severe protein-calorie malnutrition.    This patient is being managed with:   Diet NPO with Tube Feed-  Tube Feeding Modality: Nasogastric  Pivot 1.5 [place] (PIVOT1.5RTH)  Total Volume for 24 Hours (mL): 240  Continuous  Starting Tube Feed Rate {mL per Hour}: 10  Until Goal Tube Feed Rate (mL per Hour): 10  Tube Feed Duration (in Hours): 24  Tube Feed Start Time: 13:30  Entered: Dec  8 2021  1:27PM    
This patient has been assessed with a concern for Malnutrition and has been determined to have a diagnosis/diagnoses of Severe protein-calorie malnutrition.    This patient is being managed with:   Diet NPO-  Except Medications  Entered: Dec  4 2021  8:03PM    
This patient has been assessed with a concern for Malnutrition and has been determined to have a diagnosis/diagnoses of Severe protein-calorie malnutrition.    This patient is being managed with:   Diet NPO-  Except Medications  Entered: Dec 11 2021  8:07PM    
This patient has been assessed with a concern for Malnutrition and has been determined to have a diagnosis/diagnoses of Severe protein-calorie malnutrition.    This patient is being managed with:   Diet Pureed-  Moderately Thick Liquids (MODTHICKLIQS)  Entered: Dec 15 2021 11:27AM    
This patient has been assessed with a concern for Malnutrition and has been determined to have a diagnosis/diagnoses of Severe protein-calorie malnutrition.    This patient is being managed with:   Diet Pureed-  Moderately Thick Liquids (MODTHICKLIQS)  Tube Feeding Modality: Nasogastric  Pivot 1.5 [place] (PIVOT1.5RTH)  Total Volume for 24 Hours (mL): 720  Continuous  Starting Tube Feed Rate {mL per Hour}: 10  Until Goal Tube Feed Rate (mL per Hour): 30  Tube Feed Duration (in Hours): 24  Tube Feed Start Time: 13:30  Free Water Flush   Total Volume per Flush (mL): 150   Frequency: Every 4 Hours   Total Daily Volume of Flush (mL): 900    Start Time: 13:30  Entered: Dec  9 2021 12:59PM    
This patient has been assessed with a concern for Malnutrition and has been determined to have a diagnosis/diagnoses of Severe protein-calorie malnutrition.    This patient is being managed with:   Diet Pureed-  Moderately Thick Liquids (MODTHICKLIQS)  Tube Feeding Modality: Nasogastric  Pivot 1.5 [place] (PIVOT1.5RTH)  Total Volume for 24 Hours (mL): 720  Continuous  Starting Tube Feed Rate {mL per Hour}: 10  Until Goal Tube Feed Rate (mL per Hour): 30  Tube Feed Duration (in Hours): 24  Tube Feed Start Time: 13:30  Free Water Flush   Total Volume per Flush (mL): 150   Frequency: Every 4 Hours   Total Daily Volume of Flush (mL): 900    Start Time: 13:30  Entered: Dec  9 2021 12:59PM    
This patient has been assessed with a concern for Malnutrition and has been determined to have a diagnosis/diagnoses of Severe protein-calorie malnutrition.    This patient is being managed with:   Diet NPO with Tube Feed-  Tube Feeding Modality: Nasogastric  Pivot 1.5 [place] (PIVOT1.5RTH)  Total Volume for 24 Hours (mL): 960  Continuous  Starting Tube Feed Rate {mL per Hour}: 20  Increase Tube Feed Rate by (mL): 10     Every 2 hours  Until Goal Tube Feed Rate (mL per Hour): 40  Tube Feed Duration (in Hours): 24  Tube Feed Start Time: 14:40  Entered: Dec 13 2021  2:37PM    
This patient has been assessed with a concern for Malnutrition and has been determined to have a diagnosis/diagnoses of Severe protein-calorie malnutrition.    This patient is being managed with:   Diet NPO-  Entered: Dec  6 2021  6:36PM    
This patient has been assessed with a concern for Malnutrition and has been determined to have a diagnosis/diagnoses of Severe protein-calorie malnutrition.    This patient is being managed with:   Diet NPO-  Except Medications  Entered: Dec  4 2021  8:03PM    
This patient has been assessed with a concern for Malnutrition and has been determined to have a diagnosis/diagnoses of Severe protein-calorie malnutrition.    This patient is being managed with:   Diet Pureed-  Moderately Thick Liquids (MODTHICKLIQS)  Entered: Dec 15 2021 11:27AM

## 2021-12-20 VITALS
SYSTOLIC BLOOD PRESSURE: 101 MMHG | DIASTOLIC BLOOD PRESSURE: 69 MMHG | RESPIRATION RATE: 18 BRPM | TEMPERATURE: 98 F | OXYGEN SATURATION: 92 % | HEART RATE: 111 BPM

## 2021-12-20 LAB
ANION GAP SERPL CALC-SCNC: 9 MMOL/L — SIGNIFICANT CHANGE UP (ref 5–17)
BUN SERPL-MCNC: 8.8 MG/DL — SIGNIFICANT CHANGE UP (ref 8–20)
CALCIUM SERPL-MCNC: 7.7 MG/DL — LOW (ref 8.6–10.2)
CHLORIDE SERPL-SCNC: 103 MMOL/L — SIGNIFICANT CHANGE UP (ref 98–107)
CO2 SERPL-SCNC: 26 MMOL/L — SIGNIFICANT CHANGE UP (ref 22–29)
CREAT SERPL-MCNC: 0.44 MG/DL — LOW (ref 0.5–1.3)
GLUCOSE SERPL-MCNC: 177 MG/DL — HIGH (ref 70–99)
HCT VFR BLD CALC: 34.8 % — SIGNIFICANT CHANGE UP (ref 34.5–45)
HGB BLD-MCNC: 11.6 G/DL — SIGNIFICANT CHANGE UP (ref 11.5–15.5)
MAGNESIUM SERPL-MCNC: 2 MG/DL — SIGNIFICANT CHANGE UP (ref 1.6–2.6)
MCHC RBC-ENTMCNC: 28.2 PG — SIGNIFICANT CHANGE UP (ref 27–34)
MCHC RBC-ENTMCNC: 33.3 GM/DL — SIGNIFICANT CHANGE UP (ref 32–36)
MCV RBC AUTO: 84.7 FL — SIGNIFICANT CHANGE UP (ref 80–100)
PHOSPHATE SERPL-MCNC: 2.8 MG/DL — SIGNIFICANT CHANGE UP (ref 2.4–4.7)
PLATELET # BLD AUTO: 555 K/UL — HIGH (ref 150–400)
POTASSIUM SERPL-MCNC: 4 MMOL/L — SIGNIFICANT CHANGE UP (ref 3.5–5.3)
POTASSIUM SERPL-SCNC: 4 MMOL/L — SIGNIFICANT CHANGE UP (ref 3.5–5.3)
RBC # BLD: 4.11 M/UL — SIGNIFICANT CHANGE UP (ref 3.8–5.2)
RBC # FLD: 14.4 % — SIGNIFICANT CHANGE UP (ref 10.3–14.5)
SARS-COV-2 RNA SPEC QL NAA+PROBE: SIGNIFICANT CHANGE UP
SODIUM SERPL-SCNC: 137 MMOL/L — SIGNIFICANT CHANGE UP (ref 135–145)
WBC # BLD: 13.91 K/UL — HIGH (ref 3.8–10.5)
WBC # FLD AUTO: 13.91 K/UL — HIGH (ref 3.8–10.5)

## 2021-12-20 RX ADMIN — SODIUM CHLORIDE 100 MILLILITER(S): 9 INJECTION, SOLUTION INTRAVENOUS at 05:43

## 2021-12-20 RX ADMIN — Medication 1 SPRAY(S): at 05:49

## 2021-12-20 RX ADMIN — Medication 2000 UNIT(S): at 12:38

## 2021-12-20 RX ADMIN — Medication 1 TABLET(S): at 15:16

## 2021-12-20 RX ADMIN — Medication 800 MILLIGRAM(S): at 05:44

## 2021-12-20 RX ADMIN — Medication 20 MILLIGRAM(S): at 05:45

## 2021-12-20 RX ADMIN — PIPERACILLIN AND TAZOBACTAM 25 GRAM(S): 4; .5 INJECTION, POWDER, LYOPHILIZED, FOR SOLUTION INTRAVENOUS at 05:44

## 2021-12-20 RX ADMIN — ESCITALOPRAM OXALATE 5 MILLIGRAM(S): 10 TABLET, FILM COATED ORAL at 12:37

## 2021-12-20 RX ADMIN — Medication 12.5 MICROGRAM(S): at 05:48

## 2021-12-20 RX ADMIN — LORATADINE 10 MILLIGRAM(S): 10 TABLET ORAL at 12:38

## 2021-12-20 RX ADMIN — ENOXAPARIN SODIUM 40 MILLIGRAM(S): 100 INJECTION SUBCUTANEOUS at 12:37

## 2021-12-20 RX ADMIN — Medication 1 PACKET(S): at 05:50

## 2021-12-20 NOTE — CHART NOTE - NSCHARTNOTESELECT_GEN_ALL_CORE
Event Note
Event Note
Nutrition Services
CRS plan/Event Note
Event Note
Nutrition Services
chart note

## 2021-12-20 NOTE — PROGRESS NOTE ADULT - SUBJECTIVE AND OBJECTIVE BOX
INTERVAL HPI/OVERNIGHT EVENTS:    Patient evaluated at bedside. No acute distress. No acute events overnight. Yesterday WBC slightly increased, no fever or complaints.     MEDICATIONS  (STANDING):  cholecalciferol 2000 Unit(s) Oral daily  dextrose 5% + lactated ringers. 1000 milliLiter(s) (100 mL/Hr) IV Continuous <Continuous>  dextrose 5% + sodium chloride 0.9% 1000 milliLiter(s) (100 mL/Hr) IV Continuous <Continuous>  enoxaparin Injectable 40 milliGRAM(s) SubCutaneous daily  escitalopram 5 milliGRAM(s) Oral daily  fluticasone propionate 50 MICROgram(s)/spray Nasal Spray 1 Spray(s) Both Nostrils every 12 hours  lactobacillus acidophilus 1 Tablet(s) Oral daily  levothyroxine Injectable 12.5 MICROGram(s) IV Push <User Schedule>  loratadine 10 milliGRAM(s) Oral daily  mesalamine DR Capsule 800 milliGRAM(s) Oral every 12 hours  piperacillin/tazobactam IVPB.. 3.375 Gram(s) IV Intermittent every 8 hours  polyethylene glycol 3350 17 Gram(s) Oral daily  potassium phosphate / sodium phosphate Powder (PHOS-NaK) 1 Packet(s) Oral every 4 hours  potassium phosphate / sodium phosphate Powder (PHOS-NaK) 1 Packet(s) Oral once  torsemide 20 milliGRAM(s) Oral daily    MEDICATIONS  (PRN):  ALBUTerol    90 MICROgram(s) HFA Inhaler 2 Puff(s) Inhalation every 6 hours PRN Shortness of Breath and/or Wheezing  albuterol/ipratropium for Nebulization 3 milliLiter(s) Nebulizer every 6 hours PRN Wheezing  aluminum hydroxide/magnesium hydroxide/simethicone Suspension 30 milliLiter(s) Oral every 4 hours PRN Dyspepsia      Vital Signs Last 24 Hrs  T(C): 36.7 (20 Dec 2021 00:00), Max: 36.9 (19 Dec 2021 18:54)  T(F): 98.1 (20 Dec 2021 00:00), Max: 98.4 (19 Dec 2021 18:54)  HR: 95 (20 Dec 2021 00:00) (80 - 107)  BP: 100/64 (20 Dec 2021 00:00) (96/70 - 119/71)  BP(mean): --  RR: 18 (20 Dec 2021 00:00) (15 - 18)  SpO2: 94% (20 Dec 2021 00:00) (94% - 98%)    Physical Exam:   GEN: patient resting comfortably in bed, nonverbal per baseline  RESP: respirations are unlabored, no accessory muscle use  CVS: RRR  GI: Abdomen soft, non-tender, non-distended, no rebound tenderness / guarding, ostomy with pink and patient stoma  MSK: contracted per baseline      I&O's Detail    18 Dec 2021 07:01  -  19 Dec 2021 07:00  --------------------------------------------------------  IN:    dextrose 5% + sodium chloride 0.9% w/ Additives: 2000 mL    IV PiggyBack: 100 mL    IV PiggyBack: 50 mL    Oral Fluid: 100 mL  Total IN: 2250 mL    OUT:    Colostomy (mL): 200 mL    Drain (mL): 1025 mL    Nasogastric/Oral tube (mL): 200 mL    Voided (mL): 1800 mL  Total OUT: 3225 mL    Total NET: -975 mL      19 Dec 2021 07:01  -  20 Dec 2021 02:16  --------------------------------------------------------  IN:    dextrose 5% + sodium chloride 0.9% w/ Additives: 700 mL    IV PiggyBack: 50 mL    IV PiggyBack: 100 mL  Total IN: 850 mL    OUT:    Drain (mL): 50 mL  Total OUT: 50 mL    Total NET: 800 mL          LABS:                        11.8   13.58 )-----------( 475      ( 19 Dec 2021 06:08 )             36.5     12-19    135  |  101  |  9.4  ----------------------------<  93  4.4   |  24.0  |  0.40<L>    Ca    8.1<L>      19 Dec 2021 06:08  Phos  2.3     12-19  Mg     1.9     12-19            RADIOLOGY & ADDITIONAL STUDIES:

## 2021-12-20 NOTE — PROGRESS NOTE ADULT - ATTENDING SUPERVISION STATEMENT
ACP
Resident
ACP

## 2021-12-20 NOTE — PROGRESS NOTE ADULT - ATTENDING COMMENTS
Patient seen and examined this morning.  Seems to be stable at baseline.  The drain output was only 50 cc over the last 24 hours.  Output is clear.  Abdomen remains soft and nontender.  Colostomy is functioning well with stool.  White blood cell count has been about the same compared to yesterday at 13.  She has been afebrile.  The plan is to transition to oral antibiotics and repeat imaging as an outpatient.  May be discharged to group home later today if remains hemodynamically stable with complete course of antibiotics. Previously Declined (within the last year)

## 2021-12-20 NOTE — PROGRESS NOTE ADULT - REASON FOR ADMISSION
Volvulus

## 2021-12-20 NOTE — PROGRESS NOTE ADULT - ASSESSMENT
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   Due to likelihood of recurrence, surgical intervention was recommended.   Now s/p open sigmoidectomy with ostomy creation. Patient progressing well with functioning ostomy. NG tube   Placed for nutritional support, incidentally discovered to have a pneumoperitoneum after work up.  CT imaging with IV contrast confirmed pneumoperitoneum and ascites, no evidence of perforated viscus but that assessment could not be ruled out.  She was seen by speech and swallow that recommended pureed diet, moderately thick with supplemental tube feeds.    IR placed two peritoneal drains for persistent ascities, cultures sent.      RUQ drain was removed 12/16.  Patient NG tube removed and placed back on pureed moderately thick     PLAN:    - Pending transport to facility; approved by CM  - Pain control MM   - Monitor ostomy o/p   - Pureed moderately thick diet   - Monitor lytes &  replete PRN   - Appreciate Med recs  - FU IR drain cultures   - Continue with Zosyn, patient will continue PO antibiotics on discharge. Will contact ID today due to increased of WBC despite abx.  - If WBC continues to increase or patient deteriorates clinically will obtain new CT AP

## 2021-12-20 NOTE — CHART NOTE - NSCHARTNOTEFT_GEN_A_CORE
Per outpatient pharmacy, the patient has an allergy to augmentin. However, patient has been receiving zosyn in the hospital without any signs of allergic reaction.  Discussed with ID Dr. Bartlett, who wanted patient to receive a dose of augmentin while in the hospital and observe for allergic reaction.   The patient was given a dose of augmentin and observed for > 1 hour.   The patient is non verbal, however does not appear to be in any distress. She is resting  comfortably.   No signs of respiratory distress. no nausea/vomiting, no rashes    PE  NAD  lungs CTABL, no wheezing , satting well.   HR RRR  skin - no erythema, no rashes.     Patient tolerated the augmentin, ok to be discharged back to her facility.

## 2021-12-20 NOTE — PROGRESS NOTE ADULT - PROVIDER SPECIALTY LIST ADULT
Cardiology
Colorectal Surgery
Colorectal Surgery
Gastroenterology
Hospitalist
Infectious Disease
Colorectal Surgery
Gastroenterology
Internal Medicine
Internal Medicine
Surgery
Cardiology
Colorectal Surgery
Gastroenterology
Gastroenterology
Hospitalist
Infectious Disease
Internal Medicine
Colorectal Surgery
Hospitalist
Internal Medicine
Internal Medicine
Colorectal Surgery
Hospitalist
Surgery
Gastroenterology

## 2021-12-30 ENCOUNTER — APPOINTMENT (OUTPATIENT)
Dept: ULTRASOUND IMAGING | Facility: CLINIC | Age: 61
End: 2021-12-30

## 2021-12-30 PROCEDURE — 87040 BLOOD CULTURE FOR BACTERIA: CPT

## 2021-12-30 PROCEDURE — 87186 SC STD MICRODIL/AGAR DIL: CPT

## 2021-12-30 PROCEDURE — 85027 COMPLETE CBC AUTOMATED: CPT

## 2021-12-30 PROCEDURE — 86850 RBC ANTIBODY SCREEN: CPT

## 2021-12-30 PROCEDURE — 85730 THROMBOPLASTIN TIME PARTIAL: CPT

## 2021-12-30 PROCEDURE — 82040 ASSAY OF SERUM ALBUMIN: CPT

## 2021-12-30 PROCEDURE — C9399: CPT

## 2021-12-30 PROCEDURE — 85025 COMPLETE CBC W/AUTO DIFF WBC: CPT

## 2021-12-30 PROCEDURE — 87070 CULTURE OTHR SPECIMN AEROBIC: CPT

## 2021-12-30 PROCEDURE — 93306 TTE W/DOPPLER COMPLETE: CPT

## 2021-12-30 PROCEDURE — 86900 BLOOD TYPING SEROLOGIC ABO: CPT

## 2021-12-30 PROCEDURE — 74018 RADEX ABDOMEN 1 VIEW: CPT

## 2021-12-30 PROCEDURE — C1769: CPT

## 2021-12-30 PROCEDURE — 87075 CULTR BACTERIA EXCEPT BLOOD: CPT

## 2021-12-30 PROCEDURE — 81001 URINALYSIS AUTO W/SCOPE: CPT

## 2021-12-30 PROCEDURE — 85610 PROTHROMBIN TIME: CPT

## 2021-12-30 PROCEDURE — 83735 ASSAY OF MAGNESIUM: CPT

## 2021-12-30 PROCEDURE — 83605 ASSAY OF LACTIC ACID: CPT

## 2021-12-30 PROCEDURE — 94640 AIRWAY INHALATION TREATMENT: CPT

## 2021-12-30 PROCEDURE — 84295 ASSAY OF SERUM SODIUM: CPT

## 2021-12-30 PROCEDURE — 87077 CULTURE AEROBIC IDENTIFY: CPT

## 2021-12-30 PROCEDURE — 85014 HEMATOCRIT: CPT

## 2021-12-30 PROCEDURE — 82803 BLOOD GASES ANY COMBINATION: CPT

## 2021-12-30 PROCEDURE — 74176 CT ABD & PELVIS W/O CONTRAST: CPT

## 2021-12-30 PROCEDURE — 93005 ELECTROCARDIOGRAM TRACING: CPT

## 2021-12-30 PROCEDURE — 85018 HEMOGLOBIN: CPT

## 2021-12-30 PROCEDURE — 80076 HEPATIC FUNCTION PANEL: CPT

## 2021-12-30 PROCEDURE — 88307 TISSUE EXAM BY PATHOLOGIST: CPT

## 2021-12-30 PROCEDURE — 87205 SMEAR GRAM STAIN: CPT

## 2021-12-30 PROCEDURE — 83880 ASSAY OF NATRIURETIC PEPTIDE: CPT

## 2021-12-30 PROCEDURE — 92526 ORAL FUNCTION THERAPY: CPT

## 2021-12-30 PROCEDURE — 80053 COMPREHEN METABOLIC PANEL: CPT

## 2021-12-30 PROCEDURE — 75989 ABSCESS DRAINAGE UNDER X-RAY: CPT

## 2021-12-30 PROCEDURE — 82330 ASSAY OF CALCIUM: CPT

## 2021-12-30 PROCEDURE — 82435 ASSAY OF BLOOD CHLORIDE: CPT

## 2021-12-30 PROCEDURE — 84484 ASSAY OF TROPONIN QUANT: CPT

## 2021-12-30 PROCEDURE — 84132 ASSAY OF SERUM POTASSIUM: CPT

## 2021-12-30 PROCEDURE — 74177 CT ABD & PELVIS W/CONTRAST: CPT

## 2021-12-30 PROCEDURE — U0005: CPT

## 2021-12-30 PROCEDURE — C1729: CPT

## 2021-12-30 PROCEDURE — 36415 COLL VENOUS BLD VENIPUNCTURE: CPT

## 2021-12-30 PROCEDURE — 80048 BASIC METABOLIC PNL TOTAL CA: CPT

## 2021-12-30 PROCEDURE — 84100 ASSAY OF PHOSPHORUS: CPT

## 2021-12-30 PROCEDURE — U0003: CPT

## 2021-12-30 PROCEDURE — 92610 EVALUATE SWALLOWING FUNCTION: CPT

## 2021-12-30 PROCEDURE — 71260 CT THORAX DX C+: CPT

## 2021-12-30 PROCEDURE — 71045 X-RAY EXAM CHEST 1 VIEW: CPT

## 2021-12-30 PROCEDURE — 86901 BLOOD TYPING SEROLOGIC RH(D): CPT

## 2021-12-30 PROCEDURE — 86769 SARS-COV-2 COVID-19 ANTIBODY: CPT

## 2021-12-30 PROCEDURE — 82947 ASSAY GLUCOSE BLOOD QUANT: CPT

## 2021-12-30 PROCEDURE — 71250 CT THORAX DX C-: CPT

## 2021-12-30 PROCEDURE — 84443 ASSAY THYROID STIM HORMONE: CPT

## 2021-12-30 PROCEDURE — 94760 N-INVAS EAR/PLS OXIMETRY 1: CPT

## 2021-12-30 PROCEDURE — C1889: CPT

## 2021-12-30 PROCEDURE — 86803 HEPATITIS C AB TEST: CPT

## 2021-12-30 PROCEDURE — 84134 ASSAY OF PREALBUMIN: CPT

## 2021-12-30 PROCEDURE — 82962 GLUCOSE BLOOD TEST: CPT

## 2022-01-04 ENCOUNTER — APPOINTMENT (OUTPATIENT)
Dept: CT IMAGING | Facility: CLINIC | Age: 62
End: 2022-01-04
Payer: MEDICARE

## 2022-01-04 ENCOUNTER — APPOINTMENT (OUTPATIENT)
Dept: ULTRASOUND IMAGING | Facility: CLINIC | Age: 62
End: 2022-01-04

## 2022-01-04 PROCEDURE — 74176 CT ABD & PELVIS W/O CONTRAST: CPT | Mod: MH

## 2022-01-06 ENCOUNTER — APPOINTMENT (OUTPATIENT)
Dept: COLORECTAL SURGERY | Facility: CLINIC | Age: 62
End: 2022-01-06

## 2022-01-06 VITALS — HEART RATE: 106 BPM | WEIGHT: 98.2 LBS | DIASTOLIC BLOOD PRESSURE: 66 MMHG | SYSTOLIC BLOOD PRESSURE: 91 MMHG

## 2022-01-10 ENCOUNTER — APPOINTMENT (OUTPATIENT)
Dept: INTERNAL MEDICINE | Facility: CLINIC | Age: 62
End: 2022-01-10

## 2022-01-19 ENCOUNTER — TRANSCRIPTION ENCOUNTER (OUTPATIENT)
Age: 62
End: 2022-01-19

## 2022-01-25 ENCOUNTER — APPOINTMENT (OUTPATIENT)
Dept: COLORECTAL SURGERY | Facility: CLINIC | Age: 62
End: 2022-01-25

## 2022-02-09 ENCOUNTER — APPOINTMENT (OUTPATIENT)
Dept: ULTRASOUND IMAGING | Facility: CLINIC | Age: 62
End: 2022-02-09
Payer: MEDICARE

## 2022-02-09 PROCEDURE — 76641 ULTRASOUND BREAST COMPLETE: CPT | Mod: 50

## 2022-02-10 PROCEDURE — 99285 EMERGENCY DEPT VISIT HI MDM: CPT | Mod: CS,GC

## 2022-02-10 PROCEDURE — 93010 ELECTROCARDIOGRAM REPORT: CPT

## 2022-02-10 PROCEDURE — 74018 RADEX ABDOMEN 1 VIEW: CPT | Mod: 26

## 2022-02-10 PROCEDURE — 71045 X-RAY EXAM CHEST 1 VIEW: CPT | Mod: 26

## 2022-02-11 ENCOUNTER — OUTPATIENT (OUTPATIENT)
Dept: OUTPATIENT SERVICES | Facility: HOSPITAL | Age: 62
LOS: 1 days | End: 2022-02-11

## 2022-02-11 ENCOUNTER — INPATIENT (INPATIENT)
Facility: HOSPITAL | Age: 62
LOS: 1 days | Discharge: HOME | End: 2022-02-11
Attending: INTERNAL MEDICINE | Admitting: STUDENT IN AN ORGANIZED HEALTH CARE EDUCATION/TRAINING PROGRAM
Payer: MEDICARE

## 2022-02-11 ENCOUNTER — APPOINTMENT (OUTPATIENT)
Dept: OPHTHALMOLOGY | Facility: CLINIC | Age: 62
End: 2022-02-11

## 2022-02-11 DIAGNOSIS — Z98.890 OTHER SPECIFIED POSTPROCEDURAL STATES: Chronic | ICD-10-CM

## 2022-02-11 DIAGNOSIS — F72 SEVERE INTELLECTUAL DISABILITIES: ICD-10-CM

## 2022-02-11 DIAGNOSIS — R65.20 SEVERE SEPSIS WITHOUT SEPTIC SHOCK: ICD-10-CM

## 2022-02-11 DIAGNOSIS — E87.6 HYPOKALEMIA: ICD-10-CM

## 2022-02-11 DIAGNOSIS — K52.89 OTHER SPECIFIED NONINFECTIVE GASTROENTERITIS AND COLITIS: ICD-10-CM

## 2022-02-11 DIAGNOSIS — M81.0 AGE-RELATED OSTEOPOROSIS WITHOUT CURRENT PATHOLOGICAL FRACTURE: ICD-10-CM

## 2022-02-11 DIAGNOSIS — L89.154 PRESSURE ULCER OF SACRAL REGION, STAGE 4: ICD-10-CM

## 2022-02-11 DIAGNOSIS — Z20.822 CONTACT WITH AND (SUSPECTED) EXPOSURE TO COVID-19: ICD-10-CM

## 2022-02-11 DIAGNOSIS — B96.1 KLEBSIELLA PNEUMONIAE [K. PNEUMONIAE] AS THE CAUSE OF DISEASES CLASSIFIED ELSEWHERE: ICD-10-CM

## 2022-02-11 DIAGNOSIS — Z93.3 COLOSTOMY STATUS: ICD-10-CM

## 2022-02-11 DIAGNOSIS — A41.9 SEPSIS, UNSPECIFIED ORGANISM: ICD-10-CM

## 2022-02-11 DIAGNOSIS — E86.1 HYPOVOLEMIA: ICD-10-CM

## 2022-02-11 DIAGNOSIS — R09.02 HYPOXEMIA: ICD-10-CM

## 2022-02-11 DIAGNOSIS — R53.2 FUNCTIONAL QUADRIPLEGIA: ICD-10-CM

## 2022-02-11 DIAGNOSIS — F32.A DEPRESSION, UNSPECIFIED: ICD-10-CM

## 2022-02-11 DIAGNOSIS — Z98.811 DENTAL RESTORATION STATUS: Chronic | ICD-10-CM

## 2022-02-11 DIAGNOSIS — E03.9 HYPOTHYROIDISM, UNSPECIFIED: ICD-10-CM

## 2022-02-11 DIAGNOSIS — R11.10 VOMITING, UNSPECIFIED: ICD-10-CM

## 2022-02-11 DIAGNOSIS — N39.0 URINARY TRACT INFECTION, SITE NOT SPECIFIED: ICD-10-CM

## 2022-02-11 PROCEDURE — 74177 CT ABD & PELVIS W/CONTRAST: CPT | Mod: 26

## 2022-02-12 ENCOUNTER — OUTPATIENT (OUTPATIENT)
Dept: OUTPATIENT SERVICES | Facility: HOSPITAL | Age: 62
LOS: 1 days | End: 2022-02-12

## 2022-02-12 DIAGNOSIS — Z98.890 OTHER SPECIFIED POSTPROCEDURAL STATES: Chronic | ICD-10-CM

## 2022-02-12 DIAGNOSIS — Z98.811 DENTAL RESTORATION STATUS: Chronic | ICD-10-CM

## 2022-02-14 ENCOUNTER — OUTPATIENT (OUTPATIENT)
Dept: OUTPATIENT SERVICES | Facility: HOSPITAL | Age: 62
LOS: 1 days | End: 2022-02-14

## 2022-02-14 DIAGNOSIS — Z98.811 DENTAL RESTORATION STATUS: Chronic | ICD-10-CM

## 2022-02-14 DIAGNOSIS — Z98.890 OTHER SPECIFIED POSTPROCEDURAL STATES: Chronic | ICD-10-CM

## 2022-02-15 ENCOUNTER — OUTPATIENT (OUTPATIENT)
Dept: OUTPATIENT SERVICES | Facility: HOSPITAL | Age: 62
LOS: 1 days | End: 2022-02-15

## 2022-02-15 DIAGNOSIS — Z98.811 DENTAL RESTORATION STATUS: Chronic | ICD-10-CM

## 2022-02-15 DIAGNOSIS — Z98.890 OTHER SPECIFIED POSTPROCEDURAL STATES: Chronic | ICD-10-CM

## 2022-03-05 PROCEDURE — 74177 CT ABD & PELVIS W/CONTRAST: CPT | Mod: 26

## 2022-03-05 PROCEDURE — 93010 ELECTROCARDIOGRAM REPORT: CPT

## 2022-03-05 PROCEDURE — 71275 CT ANGIOGRAPHY CHEST: CPT | Mod: 26

## 2022-03-05 PROCEDURE — 99285 EMERGENCY DEPT VISIT HI MDM: CPT

## 2022-03-05 PROCEDURE — 71045 X-RAY EXAM CHEST 1 VIEW: CPT | Mod: 26

## 2022-03-06 ENCOUNTER — OUTPATIENT (OUTPATIENT)
Dept: OUTPATIENT SERVICES | Facility: HOSPITAL | Age: 62
LOS: 1 days | End: 2022-03-06

## 2022-03-06 ENCOUNTER — INPATIENT (INPATIENT)
Facility: HOSPITAL | Age: 62
LOS: 10 days | Discharge: NURSING, REHAB OR LONG-TERM CARE | End: 2022-03-17
Attending: SURGERY | Admitting: SURGERY
Payer: MEDICARE

## 2022-03-06 DIAGNOSIS — Z98.811 DENTAL RESTORATION STATUS: Chronic | ICD-10-CM

## 2022-03-06 DIAGNOSIS — Z98.890 OTHER SPECIFIED POSTPROCEDURAL STATES: Chronic | ICD-10-CM

## 2022-03-06 PROCEDURE — 71045 X-RAY EXAM CHEST 1 VIEW: CPT | Mod: 26

## 2022-03-08 PROCEDURE — 93010 ELECTROCARDIOGRAM REPORT: CPT

## 2022-03-11 DIAGNOSIS — T17.918A GASTRIC CONTENTS IN RESPIRATORY TRACT, PART UNSPECIFIED CAUSING OTHER INJURY, INITIAL ENCOUNTER: ICD-10-CM

## 2022-03-11 DIAGNOSIS — N10 ACUTE PYELONEPHRITIS: ICD-10-CM

## 2022-03-11 DIAGNOSIS — A41.9 SEPSIS, UNSPECIFIED ORGANISM: ICD-10-CM

## 2022-03-11 DIAGNOSIS — R11.10 VOMITING, UNSPECIFIED: ICD-10-CM

## 2022-03-11 DIAGNOSIS — E86.0 DEHYDRATION: ICD-10-CM

## 2022-03-11 DIAGNOSIS — N39.0 URINARY TRACT INFECTION, SITE NOT SPECIFIED: ICD-10-CM

## 2022-03-11 PROCEDURE — 74018 RADEX ABDOMEN 1 VIEW: CPT | Mod: 26

## 2022-03-14 DIAGNOSIS — E86.0 DEHYDRATION: ICD-10-CM

## 2022-03-14 DIAGNOSIS — R11.10 VOMITING, UNSPECIFIED: ICD-10-CM

## 2022-03-14 DIAGNOSIS — N10 ACUTE PYELONEPHRITIS: ICD-10-CM

## 2022-03-14 DIAGNOSIS — A41.9 SEPSIS, UNSPECIFIED ORGANISM: ICD-10-CM

## 2022-03-14 DIAGNOSIS — N39.0 URINARY TRACT INFECTION, SITE NOT SPECIFIED: ICD-10-CM

## 2022-03-14 DIAGNOSIS — T17.918A GASTRIC CONTENTS IN RESPIRATORY TRACT, PART UNSPECIFIED CAUSING OTHER INJURY, INITIAL ENCOUNTER: ICD-10-CM

## 2022-03-14 PROCEDURE — 74177 CT ABD & PELVIS W/CONTRAST: CPT | Mod: 26

## 2022-03-14 PROCEDURE — 93010 ELECTROCARDIOGRAM REPORT: CPT

## 2022-03-14 PROCEDURE — 74018 RADEX ABDOMEN 1 VIEW: CPT | Mod: 26

## 2022-03-14 PROCEDURE — 71045 X-RAY EXAM CHEST 1 VIEW: CPT | Mod: 26

## 2022-03-15 ENCOUNTER — OUTPATIENT (OUTPATIENT)
Dept: OUTPATIENT SERVICES | Facility: HOSPITAL | Age: 62
LOS: 1 days | End: 2022-03-15

## 2022-03-15 ENCOUNTER — APPOINTMENT (OUTPATIENT)
Dept: OPHTHALMOLOGY | Facility: CLINIC | Age: 62
End: 2022-03-15

## 2022-03-15 DIAGNOSIS — A41.9 SEPSIS, UNSPECIFIED ORGANISM: ICD-10-CM

## 2022-03-15 DIAGNOSIS — Z98.890 OTHER SPECIFIED POSTPROCEDURAL STATES: Chronic | ICD-10-CM

## 2022-03-15 DIAGNOSIS — R11.10 VOMITING, UNSPECIFIED: ICD-10-CM

## 2022-03-15 DIAGNOSIS — E86.0 DEHYDRATION: ICD-10-CM

## 2022-03-15 DIAGNOSIS — N39.0 URINARY TRACT INFECTION, SITE NOT SPECIFIED: ICD-10-CM

## 2022-03-15 DIAGNOSIS — N10 ACUTE PYELONEPHRITIS: ICD-10-CM

## 2022-03-15 DIAGNOSIS — T17.918A GASTRIC CONTENTS IN RESPIRATORY TRACT, PART UNSPECIFIED CAUSING OTHER INJURY, INITIAL ENCOUNTER: ICD-10-CM

## 2022-03-15 DIAGNOSIS — Z98.811 DENTAL RESTORATION STATUS: Chronic | ICD-10-CM

## 2022-03-15 PROCEDURE — 74018 RADEX ABDOMEN 1 VIEW: CPT | Mod: 26

## 2022-03-15 PROCEDURE — 74250 X-RAY XM SM INT 1CNTRST STD: CPT | Mod: 26

## 2022-03-16 ENCOUNTER — OUTPATIENT (OUTPATIENT)
Dept: OUTPATIENT SERVICES | Facility: HOSPITAL | Age: 62
LOS: 1 days | End: 2022-03-16

## 2022-03-16 DIAGNOSIS — N39.0 URINARY TRACT INFECTION, SITE NOT SPECIFIED: ICD-10-CM

## 2022-03-16 DIAGNOSIS — Z98.890 OTHER SPECIFIED POSTPROCEDURAL STATES: Chronic | ICD-10-CM

## 2022-03-16 DIAGNOSIS — A41.9 SEPSIS, UNSPECIFIED ORGANISM: ICD-10-CM

## 2022-03-16 DIAGNOSIS — N10 ACUTE PYELONEPHRITIS: ICD-10-CM

## 2022-03-16 DIAGNOSIS — T17.918A GASTRIC CONTENTS IN RESPIRATORY TRACT, PART UNSPECIFIED CAUSING OTHER INJURY, INITIAL ENCOUNTER: ICD-10-CM

## 2022-03-16 DIAGNOSIS — E86.0 DEHYDRATION: ICD-10-CM

## 2022-03-16 DIAGNOSIS — R11.10 VOMITING, UNSPECIFIED: ICD-10-CM

## 2022-03-16 DIAGNOSIS — Z98.811 DENTAL RESTORATION STATUS: Chronic | ICD-10-CM

## 2022-03-17 PROCEDURE — 93010 ELECTROCARDIOGRAM REPORT: CPT

## 2022-03-17 PROCEDURE — 99285 EMERGENCY DEPT VISIT HI MDM: CPT

## 2022-03-17 PROCEDURE — 71045 X-RAY EXAM CHEST 1 VIEW: CPT | Mod: 26

## 2022-03-18 ENCOUNTER — INPATIENT (INPATIENT)
Facility: HOSPITAL | Age: 62
LOS: 9 days | Discharge: HOME | End: 2022-03-28
Payer: MEDICARE

## 2022-03-18 ENCOUNTER — OUTPATIENT (OUTPATIENT)
Dept: OUTPATIENT SERVICES | Facility: HOSPITAL | Age: 62
LOS: 1 days | End: 2022-03-18

## 2022-03-18 DIAGNOSIS — Z98.890 OTHER SPECIFIED POSTPROCEDURAL STATES: Chronic | ICD-10-CM

## 2022-03-18 DIAGNOSIS — Z98.811 DENTAL RESTORATION STATUS: Chronic | ICD-10-CM

## 2022-03-18 DIAGNOSIS — M81.0 AGE-RELATED OSTEOPOROSIS WITHOUT CURRENT PATHOLOGICAL FRACTURE: ICD-10-CM

## 2022-03-18 DIAGNOSIS — D75.839 THROMBOCYTOSIS, UNSPECIFIED: ICD-10-CM

## 2022-03-18 DIAGNOSIS — L89.154 PRESSURE ULCER OF SACRAL REGION, STAGE 4: ICD-10-CM

## 2022-03-18 DIAGNOSIS — J90 PLEURAL EFFUSION, NOT ELSEWHERE CLASSIFIED: ICD-10-CM

## 2022-03-18 DIAGNOSIS — E43 UNSPECIFIED SEVERE PROTEIN-CALORIE MALNUTRITION: ICD-10-CM

## 2022-03-18 DIAGNOSIS — E03.9 HYPOTHYROIDISM, UNSPECIFIED: ICD-10-CM

## 2022-03-18 DIAGNOSIS — Z93.3 COLOSTOMY STATUS: ICD-10-CM

## 2022-03-18 DIAGNOSIS — K56.609 UNSPECIFIED INTESTINAL OBSTRUCTION, UNSPECIFIED AS TO PARTIAL VERSUS COMPLETE OBSTRUCTION: ICD-10-CM

## 2022-03-18 DIAGNOSIS — F32.A DEPRESSION, UNSPECIFIED: ICD-10-CM

## 2022-03-18 DIAGNOSIS — R18.8 OTHER ASCITES: ICD-10-CM

## 2022-03-18 DIAGNOSIS — Z79.890 HORMONE REPLACEMENT THERAPY: ICD-10-CM

## 2022-03-18 DIAGNOSIS — F73 PROFOUND INTELLECTUAL DISABILITIES: ICD-10-CM

## 2022-03-18 PROCEDURE — 71275 CT ANGIOGRAPHY CHEST: CPT | Mod: 26

## 2022-03-18 PROCEDURE — 99223 1ST HOSP IP/OBS HIGH 75: CPT

## 2022-03-18 PROCEDURE — 93306 TTE W/DOPPLER COMPLETE: CPT | Mod: 26

## 2022-03-19 ENCOUNTER — OUTPATIENT (OUTPATIENT)
Dept: OUTPATIENT SERVICES | Facility: HOSPITAL | Age: 62
LOS: 1 days | End: 2022-03-19

## 2022-03-19 DIAGNOSIS — Z98.811 DENTAL RESTORATION STATUS: Chronic | ICD-10-CM

## 2022-03-19 DIAGNOSIS — Z98.890 OTHER SPECIFIED POSTPROCEDURAL STATES: Chronic | ICD-10-CM

## 2022-03-19 PROCEDURE — 74177 CT ABD & PELVIS W/CONTRAST: CPT | Mod: 26

## 2022-03-19 PROCEDURE — 93970 EXTREMITY STUDY: CPT | Mod: 26

## 2022-03-19 PROCEDURE — 93010 ELECTROCARDIOGRAM REPORT: CPT

## 2022-03-20 ENCOUNTER — OUTPATIENT (OUTPATIENT)
Dept: OUTPATIENT SERVICES | Facility: HOSPITAL | Age: 62
LOS: 1 days | End: 2022-03-20

## 2022-03-20 DIAGNOSIS — Z98.890 OTHER SPECIFIED POSTPROCEDURAL STATES: Chronic | ICD-10-CM

## 2022-03-20 DIAGNOSIS — Z98.811 DENTAL RESTORATION STATUS: Chronic | ICD-10-CM

## 2022-03-20 PROCEDURE — 71045 X-RAY EXAM CHEST 1 VIEW: CPT | Mod: 26

## 2022-03-21 ENCOUNTER — OUTPATIENT (OUTPATIENT)
Dept: OUTPATIENT SERVICES | Facility: HOSPITAL | Age: 62
LOS: 1 days | End: 2022-03-21

## 2022-03-21 DIAGNOSIS — Z98.890 OTHER SPECIFIED POSTPROCEDURAL STATES: Chronic | ICD-10-CM

## 2022-03-21 DIAGNOSIS — Z98.811 DENTAL RESTORATION STATUS: Chronic | ICD-10-CM

## 2022-03-21 PROCEDURE — 88104 CYTOPATH FL NONGYN SMEARS: CPT | Mod: 26

## 2022-03-21 PROCEDURE — 93971 EXTREMITY STUDY: CPT | Mod: 26,LT

## 2022-03-21 PROCEDURE — 49083 ABD PARACENTESIS W/IMAGING: CPT

## 2022-03-21 PROCEDURE — 88305 TISSUE EXAM BY PATHOLOGIST: CPT | Mod: 26

## 2022-03-22 ENCOUNTER — OUTPATIENT (OUTPATIENT)
Dept: OUTPATIENT SERVICES | Facility: HOSPITAL | Age: 62
LOS: 1 days | End: 2022-03-22

## 2022-03-22 DIAGNOSIS — Z98.811 DENTAL RESTORATION STATUS: Chronic | ICD-10-CM

## 2022-03-22 DIAGNOSIS — Z98.890 OTHER SPECIFIED POSTPROCEDURAL STATES: Chronic | ICD-10-CM

## 2022-03-23 ENCOUNTER — OUTPATIENT (OUTPATIENT)
Dept: OUTPATIENT SERVICES | Facility: HOSPITAL | Age: 62
LOS: 1 days | End: 2022-03-23

## 2022-03-23 DIAGNOSIS — Z98.890 OTHER SPECIFIED POSTPROCEDURAL STATES: Chronic | ICD-10-CM

## 2022-03-23 DIAGNOSIS — Z98.811 DENTAL RESTORATION STATUS: Chronic | ICD-10-CM

## 2022-03-23 PROCEDURE — 74230 X-RAY XM SWLNG FUNCJ C+: CPT | Mod: 26

## 2022-03-24 ENCOUNTER — OUTPATIENT (OUTPATIENT)
Dept: OUTPATIENT SERVICES | Facility: HOSPITAL | Age: 62
LOS: 1 days | End: 2022-03-24

## 2022-03-24 DIAGNOSIS — Z98.890 OTHER SPECIFIED POSTPROCEDURAL STATES: Chronic | ICD-10-CM

## 2022-03-24 DIAGNOSIS — J90 PLEURAL EFFUSION, NOT ELSEWHERE CLASSIFIED: ICD-10-CM

## 2022-03-24 DIAGNOSIS — R60.1 GENERALIZED EDEMA: ICD-10-CM

## 2022-03-24 DIAGNOSIS — Z98.811 DENTAL RESTORATION STATUS: Chronic | ICD-10-CM

## 2022-03-24 PROCEDURE — 71045 X-RAY EXAM CHEST 1 VIEW: CPT | Mod: 26

## 2022-03-25 ENCOUNTER — OUTPATIENT (OUTPATIENT)
Dept: OUTPATIENT SERVICES | Facility: HOSPITAL | Age: 62
LOS: 1 days | End: 2022-03-25

## 2022-03-25 DIAGNOSIS — Z98.811 DENTAL RESTORATION STATUS: Chronic | ICD-10-CM

## 2022-03-25 DIAGNOSIS — Z98.890 OTHER SPECIFIED POSTPROCEDURAL STATES: Chronic | ICD-10-CM

## 2022-03-25 PROCEDURE — 71045 X-RAY EXAM CHEST 1 VIEW: CPT | Mod: 26

## 2022-03-26 ENCOUNTER — OUTPATIENT (OUTPATIENT)
Dept: OUTPATIENT SERVICES | Facility: HOSPITAL | Age: 62
LOS: 1 days | End: 2022-03-26

## 2022-03-26 DIAGNOSIS — Z98.890 OTHER SPECIFIED POSTPROCEDURAL STATES: Chronic | ICD-10-CM

## 2022-03-26 DIAGNOSIS — Z98.811 DENTAL RESTORATION STATUS: Chronic | ICD-10-CM

## 2022-03-27 ENCOUNTER — OUTPATIENT (OUTPATIENT)
Dept: OUTPATIENT SERVICES | Facility: HOSPITAL | Age: 62
LOS: 1 days | End: 2022-03-27

## 2022-03-27 DIAGNOSIS — Z98.811 DENTAL RESTORATION STATUS: Chronic | ICD-10-CM

## 2022-03-27 DIAGNOSIS — Z98.890 OTHER SPECIFIED POSTPROCEDURAL STATES: Chronic | ICD-10-CM

## 2022-03-28 ENCOUNTER — OUTPATIENT (OUTPATIENT)
Dept: OUTPATIENT SERVICES | Facility: HOSPITAL | Age: 62
LOS: 1 days | End: 2022-03-28

## 2022-03-28 DIAGNOSIS — Z98.890 OTHER SPECIFIED POSTPROCEDURAL STATES: Chronic | ICD-10-CM

## 2022-03-28 DIAGNOSIS — Z98.811 DENTAL RESTORATION STATUS: Chronic | ICD-10-CM

## 2022-03-29 DIAGNOSIS — K50.112 CROHN'S DISEASE OF LARGE INTESTINE WITH INTESTINAL OBSTRUCTION: ICD-10-CM

## 2022-03-29 DIAGNOSIS — E86.0 DEHYDRATION: ICD-10-CM

## 2022-03-29 DIAGNOSIS — R11.10 VOMITING, UNSPECIFIED: ICD-10-CM

## 2022-03-31 DIAGNOSIS — E87.6 HYPOKALEMIA: ICD-10-CM

## 2022-03-31 DIAGNOSIS — L89.154 PRESSURE ULCER OF SACRAL REGION, STAGE 4: ICD-10-CM

## 2022-03-31 DIAGNOSIS — E88.09 OTHER DISORDERS OF PLASMA-PROTEIN METABOLISM, NOT ELSEWHERE CLASSIFIED: ICD-10-CM

## 2022-03-31 DIAGNOSIS — R00.0 TACHYCARDIA, UNSPECIFIED: ICD-10-CM

## 2022-03-31 DIAGNOSIS — D72.829 ELEVATED WHITE BLOOD CELL COUNT, UNSPECIFIED: ICD-10-CM

## 2022-03-31 DIAGNOSIS — M81.0 AGE-RELATED OSTEOPOROSIS WITHOUT CURRENT PATHOLOGICAL FRACTURE: ICD-10-CM

## 2022-03-31 DIAGNOSIS — Z22.322 CARRIER OR SUSPECTED CARRIER OF METHICILLIN RESISTANT STAPHYLOCOCCUS AUREUS: ICD-10-CM

## 2022-03-31 DIAGNOSIS — K52.9 NONINFECTIVE GASTROENTERITIS AND COLITIS, UNSPECIFIED: ICD-10-CM

## 2022-03-31 DIAGNOSIS — R50.9 FEVER, UNSPECIFIED: ICD-10-CM

## 2022-03-31 DIAGNOSIS — F32.A DEPRESSION, UNSPECIFIED: ICD-10-CM

## 2022-03-31 DIAGNOSIS — Z79.890 HORMONE REPLACEMENT THERAPY: ICD-10-CM

## 2022-03-31 DIAGNOSIS — J81.1 CHRONIC PULMONARY EDEMA: ICD-10-CM

## 2022-03-31 DIAGNOSIS — J96.01 ACUTE RESPIRATORY FAILURE WITH HYPOXIA: ICD-10-CM

## 2022-03-31 DIAGNOSIS — J18.9 PNEUMONIA, UNSPECIFIED ORGANISM: ICD-10-CM

## 2022-03-31 DIAGNOSIS — A41.9 SEPSIS, UNSPECIFIED ORGANISM: ICD-10-CM

## 2022-03-31 DIAGNOSIS — R53.2 FUNCTIONAL QUADRIPLEGIA: ICD-10-CM

## 2022-03-31 DIAGNOSIS — E03.9 HYPOTHYROIDISM, UNSPECIFIED: ICD-10-CM

## 2022-03-31 DIAGNOSIS — K65.9 PERITONITIS, UNSPECIFIED: ICD-10-CM

## 2022-03-31 DIAGNOSIS — D75.839 THROMBOCYTOSIS, UNSPECIFIED: ICD-10-CM

## 2022-03-31 DIAGNOSIS — Z93.3 COLOSTOMY STATUS: ICD-10-CM

## 2022-03-31 DIAGNOSIS — F73 PROFOUND INTELLECTUAL DISABILITIES: ICD-10-CM

## 2022-04-07 DIAGNOSIS — E87.6 HYPOKALEMIA: ICD-10-CM

## 2022-04-07 DIAGNOSIS — J96.91 RESPIRATORY FAILURE, UNSPECIFIED WITH HYPOXIA: ICD-10-CM

## 2022-04-08 DIAGNOSIS — J96.91 RESPIRATORY FAILURE, UNSPECIFIED WITH HYPOXIA: ICD-10-CM

## 2022-04-08 DIAGNOSIS — E87.6 HYPOKALEMIA: ICD-10-CM

## 2022-04-11 DIAGNOSIS — J96.91 RESPIRATORY FAILURE, UNSPECIFIED WITH HYPOXIA: ICD-10-CM

## 2022-04-11 DIAGNOSIS — E87.6 HYPOKALEMIA: ICD-10-CM

## 2022-04-12 DIAGNOSIS — J96.91 RESPIRATORY FAILURE, UNSPECIFIED WITH HYPOXIA: ICD-10-CM

## 2022-04-12 DIAGNOSIS — E87.6 HYPOKALEMIA: ICD-10-CM

## 2022-04-12 DIAGNOSIS — A41.9 SEPSIS, UNSPECIFIED ORGANISM: ICD-10-CM

## 2022-04-13 ENCOUNTER — APPOINTMENT (OUTPATIENT)
Dept: RADIOLOGY | Facility: CLINIC | Age: 62
End: 2022-04-13

## 2022-04-14 DIAGNOSIS — J96.91 RESPIRATORY FAILURE, UNSPECIFIED WITH HYPOXIA: ICD-10-CM

## 2022-04-14 DIAGNOSIS — A41.9 SEPSIS, UNSPECIFIED ORGANISM: ICD-10-CM

## 2022-04-14 DIAGNOSIS — E87.6 HYPOKALEMIA: ICD-10-CM

## 2022-04-18 DIAGNOSIS — J96.91 RESPIRATORY FAILURE, UNSPECIFIED WITH HYPOXIA: ICD-10-CM

## 2022-04-18 DIAGNOSIS — E87.6 HYPOKALEMIA: ICD-10-CM

## 2022-04-18 DIAGNOSIS — A41.9 SEPSIS, UNSPECIFIED ORGANISM: ICD-10-CM

## 2022-05-23 ENCOUNTER — APPOINTMENT (OUTPATIENT)
Dept: INFECTIOUS DISEASE | Facility: CLINIC | Age: 62
End: 2022-05-23

## 2022-05-23 ENCOUNTER — APPOINTMENT (OUTPATIENT)
Dept: INTERNAL MEDICINE | Facility: CLINIC | Age: 62
End: 2022-05-23
Payer: MEDICARE

## 2022-05-23 VITALS
DIASTOLIC BLOOD PRESSURE: 60 MMHG | OXYGEN SATURATION: 97 % | SYSTOLIC BLOOD PRESSURE: 98 MMHG | HEART RATE: 114 BPM | TEMPERATURE: 98.6 F

## 2022-05-23 VITALS
SYSTOLIC BLOOD PRESSURE: 125 MMHG | HEART RATE: 110 BPM | TEMPERATURE: 98.5 F | OXYGEN SATURATION: 98 % | DIASTOLIC BLOOD PRESSURE: 81 MMHG

## 2022-05-23 PROCEDURE — 36415 COLL VENOUS BLD VENIPUNCTURE: CPT

## 2022-05-23 PROCEDURE — 99203 OFFICE O/P NEW LOW 30 MIN: CPT | Mod: 25

## 2022-05-23 PROCEDURE — 99213 OFFICE O/P EST LOW 20 MIN: CPT

## 2022-05-27 LAB
ANION GAP SERPL CALC-SCNC: 13 MMOL/L
BASOPHILS # BLD AUTO: 0.02 K/UL
BASOPHILS NFR BLD AUTO: 0.2 %
BUN SERPL-MCNC: 41 MG/DL
CALCIUM SERPL-MCNC: 10 MG/DL
CHLORIDE SERPL-SCNC: 105 MMOL/L
CO2 SERPL-SCNC: 28 MMOL/L
CREAT SERPL-MCNC: 0.49 MG/DL
EGFR: 107 ML/MIN/1.73M2
EOSINOPHIL # BLD AUTO: 0.05 K/UL
EOSINOPHIL NFR BLD AUTO: 0.6 %
GLUCOSE SERPL-MCNC: 142 MG/DL
HCT VFR BLD CALC: 41.3 %
HGB BLD-MCNC: 12.3 G/DL
IMM GRANULOCYTES NFR BLD AUTO: 0.2 %
LYMPHOCYTES # BLD AUTO: 1.51 K/UL
LYMPHOCYTES NFR BLD AUTO: 18 %
MAN DIFF?: NORMAL
MCHC RBC-ENTMCNC: 27.3 PG
MCHC RBC-ENTMCNC: 29.8 GM/DL
MCV RBC AUTO: 91.6 FL
MONOCYTES # BLD AUTO: 0.63 K/UL
MONOCYTES NFR BLD AUTO: 7.5 %
NEUTROPHILS # BLD AUTO: 6.15 K/UL
NEUTROPHILS NFR BLD AUTO: 73.5 %
PLATELET # BLD AUTO: 468 K/UL
POTASSIUM SERPL-SCNC: 4.3 MMOL/L
RBC # BLD: 4.51 M/UL
RBC # FLD: 18.1 %
SODIUM SERPL-SCNC: 145 MMOL/L
WBC # FLD AUTO: 8.38 K/UL

## 2022-06-03 ENCOUNTER — NON-APPOINTMENT (OUTPATIENT)
Age: 62
End: 2022-06-03

## 2022-06-03 ENCOUNTER — APPOINTMENT (OUTPATIENT)
Dept: OPHTHALMOLOGY | Facility: CLINIC | Age: 62
End: 2022-06-03
Payer: MEDICARE

## 2022-06-03 PROCEDURE — 92014 COMPRE OPH EXAM EST PT 1/>: CPT

## 2022-06-11 NOTE — PHYSICAL EXAM
[Sclera] : the sclera and conjunctiva were normal [PERRL With Normal Accommodation] : pupils were equal in size, round, reactive to light [Extraocular Movements] : extraocular movements were intact [Outer Ear] : the ears and nose were normal in appearance [Oropharynx] : the oropharynx was normal with no thrush [Auscultation Breath Sounds / Voice Sounds] : lungs were clear to auscultation bilaterally [Heart Sounds] : normal S1 and S2 [Heart Sounds Gallop] : no gallops [Murmurs] : no murmurs [Heart Sounds Pericardial Friction Rub] : no pericardial rub [Musculoskeletal - Swelling] : no joint swelling [Nail Clubbing] : no clubbing  or cyanosis of the fingernails [Motor Tone] : muscle strength and tone were normal [Skin Color & Pigmentation] : normal skin color and pigmentation [] : no rash [FreeTextEntry1] : ostomy intact, surgical scars healed

## 2022-06-11 NOTE — ASSESSMENT
[FreeTextEntry1] : SBO\par Peritonitis completed treatment\par \par \par will check labs today\par suggest ctap to check for persistent ascites/subdiaphragmatic fluid collection\par monitor vitals-defer further management to primary team\par surgery f/u\par f/u after CT\par paperwork from facility filled out. faxed to facility via office MA

## 2022-06-11 NOTE — HISTORY OF PRESENT ILLNESS
[FreeTextEntry1] : Patient is here for post hospitalization followup with staff from facility\par Patient  has history of severe developmental delay and is nonverbal and history obtained from [facility] [place] paperwork provided from the facility that patient she is currently admitted in ([facility] fax [phone]) \par \par She was recently admitted at [facility] 3/18/22-3/28/22 where she was not followed by me or any of our ID group (we do not have priviliges there)\par \par Per discharge paperwork she was admitted for sepsis likely secondary to bacterial peritonitis in the setting of recent surgery 2/22 for SBO ventral hernia exp lap.\par Was followed by ID at [facility]\par completed course of meropenem there\par Here for ID f/u\par No reported fever\par BP here 98/60, repeated 128/41, , repeated 110. Patient is nonverbal and does not offer any history\par \par \par I previously saw patient at [facility] in 12/2021 when she was admitted for volvulus s/p sigmoid decompression and eventualy open sigmoidectomy and ostomy creation, post op noted to have pneumoperitonium and ascites, no clear evidence of perforated viscus. Underwent IR drainage of ascitic fluid and culture of  fluid grew enterococcus. BCX were negative and she completed 14 d course of abx.

## 2022-06-12 ENCOUNTER — INPATIENT (INPATIENT)
Facility: HOSPITAL | Age: 62
LOS: 3 days | Discharge: HOME | End: 2022-06-16
Attending: SURGERY | Admitting: SURGERY
Payer: MEDICARE

## 2022-06-12 DIAGNOSIS — Z98.890 OTHER SPECIFIED POSTPROCEDURAL STATES: Chronic | ICD-10-CM

## 2022-06-12 DIAGNOSIS — Z98.811 DENTAL RESTORATION STATUS: Chronic | ICD-10-CM

## 2022-06-12 PROCEDURE — 71045 X-RAY EXAM CHEST 1 VIEW: CPT | Mod: 26

## 2022-06-12 PROCEDURE — 74177 CT ABD & PELVIS W/CONTRAST: CPT | Mod: 26,MA

## 2022-06-12 PROCEDURE — 99285 EMERGENCY DEPT VISIT HI MDM: CPT

## 2022-06-12 PROCEDURE — 93010 ELECTROCARDIOGRAM REPORT: CPT

## 2022-06-13 PROCEDURE — 74250 X-RAY XM SM INT 1CNTRST STD: CPT | Mod: 26

## 2022-06-13 PROCEDURE — 74018 RADEX ABDOMEN 1 VIEW: CPT | Mod: 26,59

## 2022-06-14 PROCEDURE — 74018 RADEX ABDOMEN 1 VIEW: CPT | Mod: 26

## 2022-06-15 ENCOUNTER — APPOINTMENT (OUTPATIENT)
Dept: CT IMAGING | Facility: CLINIC | Age: 62
End: 2022-06-15

## 2022-06-17 ENCOUNTER — OUTPATIENT (OUTPATIENT)
Dept: OUTPATIENT SERVICES | Facility: HOSPITAL | Age: 62
LOS: 1 days | End: 2022-06-17

## 2022-06-17 ENCOUNTER — INPATIENT (INPATIENT)
Facility: HOSPITAL | Age: 62
LOS: 3 days | Discharge: HOME | End: 2022-06-21
Payer: MEDICARE

## 2022-06-17 DIAGNOSIS — Z98.890 OTHER SPECIFIED POSTPROCEDURAL STATES: Chronic | ICD-10-CM

## 2022-06-17 DIAGNOSIS — Z98.811 DENTAL RESTORATION STATUS: Chronic | ICD-10-CM

## 2022-06-17 PROCEDURE — 99285 EMERGENCY DEPT VISIT HI MDM: CPT | Mod: FS

## 2022-06-17 PROCEDURE — 74176 CT ABD & PELVIS W/O CONTRAST: CPT | Mod: 26,MA

## 2022-06-18 ENCOUNTER — OUTPATIENT (OUTPATIENT)
Dept: OUTPATIENT SERVICES | Facility: HOSPITAL | Age: 62
LOS: 1 days | End: 2022-06-18

## 2022-06-18 DIAGNOSIS — Z98.811 DENTAL RESTORATION STATUS: Chronic | ICD-10-CM

## 2022-06-18 DIAGNOSIS — Z98.890 OTHER SPECIFIED POSTPROCEDURAL STATES: Chronic | ICD-10-CM

## 2022-06-19 ENCOUNTER — OUTPATIENT (OUTPATIENT)
Dept: OUTPATIENT SERVICES | Facility: HOSPITAL | Age: 62
LOS: 1 days | End: 2022-06-19

## 2022-06-19 DIAGNOSIS — Z98.890 OTHER SPECIFIED POSTPROCEDURAL STATES: Chronic | ICD-10-CM

## 2022-06-19 DIAGNOSIS — Z98.811 DENTAL RESTORATION STATUS: Chronic | ICD-10-CM

## 2022-06-20 ENCOUNTER — TRANSCRIPTION ENCOUNTER (OUTPATIENT)
Age: 62
End: 2022-06-20

## 2022-06-20 ENCOUNTER — OUTPATIENT (OUTPATIENT)
Dept: OUTPATIENT SERVICES | Facility: HOSPITAL | Age: 62
LOS: 1 days | End: 2022-06-20

## 2022-06-20 DIAGNOSIS — Z98.890 OTHER SPECIFIED POSTPROCEDURAL STATES: Chronic | ICD-10-CM

## 2022-06-20 DIAGNOSIS — Z98.811 DENTAL RESTORATION STATUS: Chronic | ICD-10-CM

## 2022-06-21 ENCOUNTER — OUTPATIENT (OUTPATIENT)
Dept: OUTPATIENT SERVICES | Facility: HOSPITAL | Age: 62
LOS: 1 days | End: 2022-06-21

## 2022-06-21 DIAGNOSIS — Z98.811 DENTAL RESTORATION STATUS: Chronic | ICD-10-CM

## 2022-06-21 DIAGNOSIS — Z98.890 OTHER SPECIFIED POSTPROCEDURAL STATES: Chronic | ICD-10-CM

## 2022-06-21 RX ORDER — POTASSIUM CHLORIDE 20 MEQ
1 PACKET (EA) ORAL
Qty: 0 | Refills: 0 | DISCHARGE

## 2022-06-21 RX ORDER — DOCUSATE SODIUM 100 MG
1 CAPSULE ORAL
Qty: 0 | Refills: 0 | DISCHARGE

## 2022-06-21 RX ORDER — MESALAMINE 400 MG
4 TABLET, DELAYED RELEASE (ENTERIC COATED) ORAL
Qty: 0 | Refills: 0 | DISCHARGE

## 2022-06-22 DIAGNOSIS — K56.609 UNSPECIFIED INTESTINAL OBSTRUCTION, UNSPECIFIED AS TO PARTIAL VERSUS COMPLETE OBSTRUCTION: ICD-10-CM

## 2022-06-22 DIAGNOSIS — F84.0 AUTISTIC DISORDER: ICD-10-CM

## 2022-06-22 DIAGNOSIS — F32.A DEPRESSION, UNSPECIFIED: ICD-10-CM

## 2022-06-22 DIAGNOSIS — Z20.822 CONTACT WITH AND (SUSPECTED) EXPOSURE TO COVID-19: ICD-10-CM

## 2022-06-22 DIAGNOSIS — E03.9 HYPOTHYROIDISM, UNSPECIFIED: ICD-10-CM

## 2022-06-22 DIAGNOSIS — F72 SEVERE INTELLECTUAL DISABILITIES: ICD-10-CM

## 2022-06-22 DIAGNOSIS — D72.829 ELEVATED WHITE BLOOD CELL COUNT, UNSPECIFIED: ICD-10-CM

## 2022-06-22 DIAGNOSIS — E44.0 MODERATE PROTEIN-CALORIE MALNUTRITION: ICD-10-CM

## 2022-06-22 DIAGNOSIS — R11.0 NAUSEA: ICD-10-CM

## 2022-06-22 DIAGNOSIS — Z93.3 COLOSTOMY STATUS: ICD-10-CM

## 2022-06-27 DIAGNOSIS — R11.10 VOMITING, UNSPECIFIED: ICD-10-CM

## 2022-06-27 DIAGNOSIS — E03.9 HYPOTHYROIDISM, UNSPECIFIED: ICD-10-CM

## 2022-06-27 DIAGNOSIS — Z93.3 COLOSTOMY STATUS: ICD-10-CM

## 2022-06-27 DIAGNOSIS — K56.699 OTHER INTESTINAL OBSTRUCTION UNSPECIFIED AS TO PARTIAL VERSUS COMPLETE OBSTRUCTION: ICD-10-CM

## 2022-06-27 DIAGNOSIS — Z20.822 CONTACT WITH AND (SUSPECTED) EXPOSURE TO COVID-19: ICD-10-CM

## 2022-06-27 DIAGNOSIS — L98.429 NON-PRESSURE CHRONIC ULCER OF BACK WITH UNSPECIFIED SEVERITY: ICD-10-CM

## 2022-06-27 DIAGNOSIS — F32.A DEPRESSION, UNSPECIFIED: ICD-10-CM

## 2022-06-27 DIAGNOSIS — Z90.49 ACQUIRED ABSENCE OF OTHER SPECIFIED PARTS OF DIGESTIVE TRACT: ICD-10-CM

## 2022-06-27 DIAGNOSIS — F84.0 AUTISTIC DISORDER: ICD-10-CM

## 2022-06-29 DIAGNOSIS — R11.10 VOMITING, UNSPECIFIED: ICD-10-CM

## 2022-06-29 DIAGNOSIS — F32.9 MAJOR DEPRESSIVE DISORDER, SINGLE EPISODE, UNSPECIFIED: ICD-10-CM

## 2022-06-29 DIAGNOSIS — F84.0 AUTISTIC DISORDER: ICD-10-CM

## 2022-06-29 DIAGNOSIS — K50.012 CROHN'S DISEASE OF SMALL INTESTINE WITH INTESTINAL OBSTRUCTION: ICD-10-CM

## 2022-06-29 DIAGNOSIS — E03.9 HYPOTHYROIDISM, UNSPECIFIED: ICD-10-CM

## 2022-06-29 PROBLEM — K59.00 CONSTIPATION, UNSPECIFIED: Chronic | Status: ACTIVE | Noted: 2021-11-27

## 2022-06-29 PROBLEM — F79 UNSPECIFIED INTELLECTUAL DISABILITIES: Chronic | Status: ACTIVE | Noted: 2021-11-27

## 2022-06-29 PROBLEM — M81.0 AGE-RELATED OSTEOPOROSIS WITHOUT CURRENT PATHOLOGICAL FRACTURE: Chronic | Status: ACTIVE | Noted: 2021-11-27

## 2022-06-29 PROBLEM — R47.01 APHASIA: Chronic | Status: ACTIVE | Noted: 2021-11-27

## 2022-06-29 PROBLEM — H26.9 UNSPECIFIED CATARACT: Chronic | Status: ACTIVE | Noted: 2021-11-27

## 2022-07-01 DIAGNOSIS — R11.10 VOMITING, UNSPECIFIED: ICD-10-CM

## 2022-07-01 DIAGNOSIS — F84.0 AUTISTIC DISORDER: ICD-10-CM

## 2022-07-01 DIAGNOSIS — E03.9 HYPOTHYROIDISM, UNSPECIFIED: ICD-10-CM

## 2022-07-01 DIAGNOSIS — K50.012 CROHN'S DISEASE OF SMALL INTESTINE WITH INTESTINAL OBSTRUCTION: ICD-10-CM

## 2022-07-01 DIAGNOSIS — F32.9 MAJOR DEPRESSIVE DISORDER, SINGLE EPISODE, UNSPECIFIED: ICD-10-CM

## 2022-07-14 DIAGNOSIS — A41.9 SEPSIS, UNSPECIFIED ORGANISM: ICD-10-CM

## 2022-07-14 DIAGNOSIS — F32.9 MAJOR DEPRESSIVE DISORDER, SINGLE EPISODE, UNSPECIFIED: ICD-10-CM

## 2022-07-14 DIAGNOSIS — K65.9 PERITONITIS, UNSPECIFIED: ICD-10-CM

## 2022-07-14 DIAGNOSIS — F84.0 AUTISTIC DISORDER: ICD-10-CM

## 2022-07-14 DIAGNOSIS — E03.9 HYPOTHYROIDISM, UNSPECIFIED: ICD-10-CM

## 2022-07-14 DIAGNOSIS — R11.10 VOMITING, UNSPECIFIED: ICD-10-CM

## 2022-07-14 DIAGNOSIS — K50.012 CROHN'S DISEASE OF SMALL INTESTINE WITH INTESTINAL OBSTRUCTION: ICD-10-CM

## 2022-07-19 DIAGNOSIS — R11.10 VOMITING, UNSPECIFIED: ICD-10-CM

## 2022-07-19 DIAGNOSIS — E03.9 HYPOTHYROIDISM, UNSPECIFIED: ICD-10-CM

## 2022-07-19 DIAGNOSIS — F32.9 MAJOR DEPRESSIVE DISORDER, SINGLE EPISODE, UNSPECIFIED: ICD-10-CM

## 2022-07-19 DIAGNOSIS — F84.0 AUTISTIC DISORDER: ICD-10-CM

## 2022-07-19 DIAGNOSIS — K50.012 CROHN'S DISEASE OF SMALL INTESTINE WITH INTESTINAL OBSTRUCTION: ICD-10-CM

## 2022-08-16 ENCOUNTER — APPOINTMENT (OUTPATIENT)
Dept: CT IMAGING | Facility: CLINIC | Age: 62
End: 2022-08-16

## 2022-08-16 PROCEDURE — G1004: CPT

## 2022-08-16 PROCEDURE — 74177 CT ABD & PELVIS W/CONTRAST: CPT | Mod: ME

## 2023-02-22 ENCOUNTER — APPOINTMENT (OUTPATIENT)
Dept: ULTRASOUND IMAGING | Facility: CLINIC | Age: 63
End: 2023-02-22
Payer: MEDICARE

## 2023-02-22 ENCOUNTER — APPOINTMENT (OUTPATIENT)
Dept: MAMMOGRAPHY | Facility: CLINIC | Age: 63
End: 2023-02-22

## 2023-02-22 PROCEDURE — 76641 ULTRASOUND BREAST COMPLETE: CPT | Mod: 50,GA

## 2023-05-26 NOTE — ASU DISCHARGE PLAN (ADULT/PEDIATRIC) - MODE OF TRANSPORTATION
This RN called VELMA Mason at [facility] to let him know the pt was leaving at this time.   Wheelchair/Stroller

## 2023-06-09 ENCOUNTER — APPOINTMENT (OUTPATIENT)
Dept: OPHTHALMOLOGY | Facility: CLINIC | Age: 63
End: 2023-06-09

## 2023-12-08 ENCOUNTER — APPOINTMENT (OUTPATIENT)
Dept: ENDOCRINOLOGY | Facility: CLINIC | Age: 63
End: 2023-12-08
Payer: MEDICARE

## 2023-12-08 VITALS
WEIGHT: 129.38 LBS | DIASTOLIC BLOOD PRESSURE: 74 MMHG | BODY MASS INDEX: 20.31 KG/M2 | SYSTOLIC BLOOD PRESSURE: 102 MMHG | HEIGHT: 67 IN

## 2023-12-08 DIAGNOSIS — K52.9 NONINFECTIVE GASTROENTERITIS AND COLITIS, UNSPECIFIED: ICD-10-CM

## 2023-12-08 DIAGNOSIS — F41.1 GENERALIZED ANXIETY DISORDER: ICD-10-CM

## 2023-12-08 DIAGNOSIS — K11.7 DISTURBANCES OF SALIVARY SECRETION: ICD-10-CM

## 2023-12-08 DIAGNOSIS — K59.00 CONSTIPATION, UNSPECIFIED: ICD-10-CM

## 2023-12-08 DIAGNOSIS — H50.10 UNSPECIFIED EXOTROPIA: ICD-10-CM

## 2023-12-08 DIAGNOSIS — K65.9 PERITONITIS, UNSPECIFIED: ICD-10-CM

## 2023-12-08 DIAGNOSIS — E55.9 VITAMIN D DEFICIENCY, UNSPECIFIED: ICD-10-CM

## 2023-12-08 DIAGNOSIS — F84.0 AUTISTIC DISORDER: ICD-10-CM

## 2023-12-08 DIAGNOSIS — Z87.81 PERSONAL HISTORY OF (HEALED) TRAUMATIC FRACTURE: ICD-10-CM

## 2023-12-08 DIAGNOSIS — H55.00 UNSPECIFIED NYSTAGMUS: ICD-10-CM

## 2023-12-08 DIAGNOSIS — S31.000A UNSPECIFIED OPEN WOUND OF LOWER BACK AND PELVIS W/OUT PENETRATION INTO RETROPERITONEUM, INITIAL ENCOUNTER: ICD-10-CM

## 2023-12-08 DIAGNOSIS — K56.7 ILEUS, UNSPECIFIED: ICD-10-CM

## 2023-12-08 DIAGNOSIS — H26.9 UNSPECIFIED CATARACT: ICD-10-CM

## 2023-12-08 DIAGNOSIS — F32.A DEPRESSION, UNSPECIFIED: ICD-10-CM

## 2023-12-08 DIAGNOSIS — B35.1 TINEA UNGUIUM: ICD-10-CM

## 2023-12-08 DIAGNOSIS — M81.0 AGE-RELATED OSTEOPOROSIS W/OUT CURRENT PATHOLOGICAL FRACTURE: ICD-10-CM

## 2023-12-08 DIAGNOSIS — R60.0 LOCALIZED EDEMA: ICD-10-CM

## 2023-12-08 DIAGNOSIS — F79 UNSPECIFIED INTELLECTUAL DISABILITIES: ICD-10-CM

## 2023-12-08 DIAGNOSIS — K46.9 UNSPECIFIED ABDOMINAL HERNIA W/OUT OBSTRUCTION OR GANGRENE: ICD-10-CM

## 2023-12-08 DIAGNOSIS — F81.89 OTHER DEVELOPMENTAL DISORDERS OF SCHOLASTIC SKILLS: ICD-10-CM

## 2023-12-08 DIAGNOSIS — R26.81 UNSTEADINESS ON FEET: ICD-10-CM

## 2023-12-08 PROCEDURE — 99204 OFFICE O/P NEW MOD 45 MIN: CPT

## 2023-12-11 PROBLEM — K46.9 ABDOMINAL HERNIA: Status: ACTIVE | Noted: 2023-12-08

## 2023-12-11 PROBLEM — Z87.81 HISTORY OF FRACTURE OF PHALANX OF THUMB: Status: RESOLVED | Noted: 2023-12-11 | Resolved: 2023-12-11

## 2023-12-11 PROBLEM — R60.0 EDEMA, LOWER EXTREMITY: Status: ACTIVE | Noted: 2023-12-08

## 2023-12-11 PROBLEM — F32.A DEPRESSION: Status: ACTIVE | Noted: 2023-12-08

## 2023-12-11 PROBLEM — K52.9 COLITIS: Status: ACTIVE | Noted: 2023-12-08

## 2023-12-11 PROBLEM — H26.9: Status: ACTIVE | Noted: 2023-12-08

## 2023-12-11 PROBLEM — H55.00 NYSTAGMUS: Status: ACTIVE | Noted: 2023-12-08

## 2023-12-11 PROBLEM — B35.1 MYCOTIC TOENAILS: Status: RESOLVED | Noted: 2023-12-11 | Resolved: 2023-12-11

## 2023-12-11 PROBLEM — F41.1 GENERALIZED ANXIETY DISORDER: Status: ACTIVE | Noted: 2023-12-11

## 2023-12-11 PROBLEM — F79 INTELLECTUAL DISABILITY: Status: ACTIVE | Noted: 2023-12-11

## 2023-12-11 PROBLEM — K56.7 ILEUS: Status: ACTIVE | Noted: 2023-12-11

## 2023-12-11 PROBLEM — F84.0 AUTISTIC DISORDER: Status: ACTIVE | Noted: 2023-12-08

## 2023-12-11 PROBLEM — K59.00 CONSTIPATION: Status: ACTIVE | Noted: 2023-12-08

## 2023-12-11 PROBLEM — R26.81 GAIT INSTABILITY: Status: ACTIVE | Noted: 2023-12-11

## 2023-12-11 PROBLEM — K65.9 PERITONITIS: Status: ACTIVE | Noted: 2022-05-23

## 2023-12-11 PROBLEM — S31.000A SACRAL WOUND: Status: RESOLVED | Noted: 2023-12-11 | Resolved: 2023-12-11

## 2023-12-11 PROBLEM — K11.7 HYPERSALIVATION: Status: ACTIVE | Noted: 2023-12-08

## 2023-12-11 PROBLEM — H50.10 EXOTROPIA: Status: ACTIVE | Noted: 2023-12-11

## 2023-12-11 PROBLEM — F81.89 NON-VERBAL LEARNING DISORDER: Status: ACTIVE | Noted: 2023-12-11

## 2023-12-11 RX ORDER — SIMETHICONE 125 MG/1
125 CAPSULE, LIQUID FILLED ORAL AT BEDTIME
Refills: 0 | Status: ACTIVE | COMMUNITY

## 2023-12-11 RX ORDER — GINGER ROOT/GINGER ROOT EXT 262.5 MG
CAPSULE ORAL
Refills: 0 | Status: ACTIVE | COMMUNITY

## 2023-12-11 RX ORDER — POTASSIUM CHLORIDE 10 MEQ
10 CAPSULE, EXTENDED RELEASE ORAL
Refills: 0 | Status: ACTIVE | COMMUNITY

## 2023-12-11 RX ORDER — YOHIMBE BARK 500 MG
CAPSULE ORAL TWICE DAILY
Refills: 0 | Status: ACTIVE | COMMUNITY

## 2023-12-11 RX ORDER — FERROUS SULFATE 220 (44)/5
220 (44 FE) SOLUTION, ORAL ORAL
Refills: 0 | Status: ACTIVE | COMMUNITY

## 2023-12-11 RX ORDER — FLUTICASONE PROPIONATE 50 UG/1
50 SPRAY, METERED NASAL
Refills: 0 | Status: ACTIVE | COMMUNITY

## 2023-12-11 RX ORDER — DOCUSATE SODIUM 100 MG
100 TABLET ORAL AT BEDTIME
Refills: 0 | Status: ACTIVE | COMMUNITY

## 2023-12-11 RX ORDER — BACILLUS COAGULANS 1B CELL
CAPSULE ORAL
Refills: 0 | Status: ACTIVE | COMMUNITY

## 2023-12-11 RX ORDER — MESALAMINE 800 MG/1
800 TABLET, DELAYED RELEASE ORAL TWICE DAILY
Refills: 0 | Status: ACTIVE | COMMUNITY

## 2023-12-11 RX ORDER — CRANBERRY FRUIT EXTRACT 425 MG
425 CAPSULE ORAL DAILY
Refills: 0 | Status: ACTIVE | COMMUNITY

## 2023-12-11 RX ORDER — LORAZEPAM 1 MG/1
1 TABLET ORAL
Refills: 0 | Status: ACTIVE | COMMUNITY

## 2023-12-11 RX ORDER — LACTULOSE 10 G/15ML
10 SOLUTION ORAL TWICE DAILY
Refills: 0 | Status: ACTIVE | COMMUNITY

## 2023-12-11 RX ORDER — TORSEMIDE 20 MG/1
20 TABLET ORAL
Refills: 0 | Status: ACTIVE | COMMUNITY

## 2023-12-11 RX ORDER — ESCITALOPRAM OXALATE 5 MG/1
5 TABLET ORAL DAILY
Refills: 0 | Status: ACTIVE | COMMUNITY

## 2023-12-11 RX ORDER — DIAZEPAM 10 MG/1
10 TABLET ORAL DAILY
Refills: 0 | Status: ACTIVE | COMMUNITY

## 2023-12-11 RX ORDER — GASTROSTOMY TUBE 20 FR
EACH MISCELLANEOUS
Refills: 0 | Status: ACTIVE | COMMUNITY

## 2023-12-11 RX ORDER — FEXOFENADINE HYDROCHLORIDE 180 MG/1
180 TABLET ORAL DAILY
Refills: 0 | Status: ACTIVE | COMMUNITY

## 2023-12-11 RX ORDER — DENOSUMAB 60 MG/ML
60 INJECTION SUBCUTANEOUS
Refills: 0 | Status: ACTIVE | COMMUNITY

## 2023-12-11 RX ORDER — HYDROXYZINE HYDROCHLORIDE 25 MG/1
25 TABLET ORAL
Refills: 0 | Status: ACTIVE | COMMUNITY

## 2023-12-11 RX ORDER — GUAIFENESIN 100 MG/5ML
LIQUID (ML) ORAL
Refills: 0 | Status: ACTIVE | COMMUNITY

## 2023-12-11 RX ORDER — CHOLECALCIFEROL (VITAMIN D3) 50 MCG
50 MCG CAPSULE ORAL
Refills: 0 | Status: ACTIVE | COMMUNITY

## 2023-12-11 RX ORDER — LEVOTHYROXINE SODIUM 0.03 MG/1
25 TABLET ORAL DAILY
Refills: 0 | Status: ACTIVE | COMMUNITY

## 2023-12-11 RX ORDER — ACETAMINOPHEN 325 MG/1
325 TABLET ORAL
Refills: 0 | Status: ACTIVE | COMMUNITY

## 2024-01-04 ENCOUNTER — APPOINTMENT (OUTPATIENT)
Dept: ENDOCRINOLOGY | Facility: CLINIC | Age: 64
End: 2024-01-04

## 2024-04-02 ENCOUNTER — OUTPATIENT (OUTPATIENT)
Dept: OUTPATIENT SERVICES | Facility: HOSPITAL | Age: 64
LOS: 1 days | End: 2024-04-02

## 2024-04-02 VITALS
SYSTOLIC BLOOD PRESSURE: 98 MMHG | HEIGHT: 67 IN | WEIGHT: 119.93 LBS | TEMPERATURE: 99 F | DIASTOLIC BLOOD PRESSURE: 67 MMHG | RESPIRATION RATE: 16 BRPM | OXYGEN SATURATION: 96 % | HEART RATE: 100 BPM

## 2024-04-02 DIAGNOSIS — Z98.890 OTHER SPECIFIED POSTPROCEDURAL STATES: Chronic | ICD-10-CM

## 2024-04-02 DIAGNOSIS — K02.62 DENTAL CARIES ON SMOOTH SURFACE PENETRATING INTO DENTIN: ICD-10-CM

## 2024-04-02 DIAGNOSIS — Z98.811 DENTAL RESTORATION STATUS: Chronic | ICD-10-CM

## 2024-04-02 DIAGNOSIS — E03.9 HYPOTHYROIDISM, UNSPECIFIED: ICD-10-CM

## 2024-04-02 DIAGNOSIS — K02.9 DENTAL CARIES, UNSPECIFIED: ICD-10-CM

## 2024-04-02 DIAGNOSIS — Z93.3 COLOSTOMY STATUS: Chronic | ICD-10-CM

## 2024-04-02 RX ORDER — SIMETHICONE 80 MG/1
1 TABLET, CHEWABLE ORAL
Refills: 0 | DISCHARGE

## 2024-04-02 RX ORDER — LEVOTHYROXINE SODIUM 125 MCG
1 TABLET ORAL
Qty: 0 | Refills: 0 | DISCHARGE

## 2024-04-02 RX ORDER — POTASSIUM CHLORIDE 20 MEQ
1 PACKET (EA) ORAL
Refills: 0 | DISCHARGE

## 2024-04-02 RX ORDER — PSYLLIUM SEED (WITH DEXTROSE)
1 POWDER (GRAM) ORAL
Qty: 0 | Refills: 0 | DISCHARGE

## 2024-04-02 RX ORDER — DOCUSATE SODIUM 100 MG
1 CAPSULE ORAL
Refills: 0 | DISCHARGE

## 2024-04-02 RX ORDER — CHOLECALCIFEROL (VITAMIN D3) 125 MCG
1 CAPSULE ORAL
Qty: 0 | Refills: 0 | DISCHARGE

## 2024-04-02 RX ORDER — DENOSUMAB 60 MG/ML
60 INJECTION SUBCUTANEOUS
Qty: 0 | Refills: 0 | DISCHARGE

## 2024-04-02 RX ORDER — EZETIMIBE 10 MG/1
1 TABLET ORAL
Refills: 0 | DISCHARGE

## 2024-04-02 RX ORDER — MESALAMINE 400 MG
1 TABLET, DELAYED RELEASE (ENTERIC COATED) ORAL
Qty: 0 | Refills: 0 | DISCHARGE

## 2024-04-02 RX ORDER — FLUTICASONE PROPIONATE 50 MCG
1 SPRAY, SUSPENSION NASAL
Qty: 0 | Refills: 0 | DISCHARGE

## 2024-04-02 RX ORDER — LACTULOSE 10 G/15ML
300 SOLUTION ORAL
Refills: 0 | DISCHARGE

## 2024-04-02 RX ORDER — ESCITALOPRAM OXALATE 10 MG/1
1 TABLET, FILM COATED ORAL
Qty: 0 | Refills: 0 | DISCHARGE

## 2024-04-02 RX ORDER — POLYETHYLENE GLYCOL 3350 17 G/17G
17 POWDER, FOR SOLUTION ORAL
Qty: 0 | Refills: 0 | DISCHARGE

## 2024-04-02 RX ORDER — ALBUTEROL 90 UG/1
2 AEROSOL, METERED ORAL
Qty: 0 | Refills: 0 | DISCHARGE

## 2024-04-02 RX ORDER — ACETAMINOPHEN 500 MG
2 TABLET ORAL
Refills: 0 | DISCHARGE

## 2024-04-02 RX ORDER — HYDROXYZINE HCL 10 MG
1 TABLET ORAL
Refills: 0 | DISCHARGE

## 2024-04-02 RX ORDER — ESCITALOPRAM OXALATE 10 MG/1
5 TABLET, FILM COATED ORAL
Refills: 0 | DISCHARGE

## 2024-04-02 RX ORDER — LACTOBACILLUS ACIDOPHILUS 100MM CELL
1 CAPSULE ORAL
Qty: 0 | Refills: 0 | DISCHARGE

## 2024-04-02 RX ORDER — FLUTICASONE PROPIONATE 220 MCG
1 AEROSOL WITH ADAPTER (GRAM) INHALATION
Qty: 0 | Refills: 0 | DISCHARGE

## 2024-04-02 RX ORDER — FERROUS SULFATE 325(65) MG
7.5 TABLET ORAL
Refills: 0 | DISCHARGE

## 2024-04-02 RX ORDER — FEXOFENADINE HCL 30 MG
1 TABLET ORAL
Qty: 0 | Refills: 0 | DISCHARGE

## 2024-04-02 NOTE — H&P PST ADULT - HISTORY OF PRESENT ILLNESS
65 y/o female PMH nonverbal autism bowel obstruction s/p colostomy presents to presurgical testing with diagnosis of periodontal disease and dental caries. Pt is scheduled for a comprehensive dental treatment under general anesthesia.

## 2024-04-02 NOTE — H&P PST ADULT - NSSUBSTANCEUSE_GEN_ALL_CORE_SD
DISPLAY PLAN FREE TEXT DISPLAY PLAN FREE TEXT DISPLAY PLAN FREE TEXT DISPLAY PLAN FREE TEXT DISPLAY PLAN FREE TEXT DISPLAY PLAN FREE TEXT DISPLAY PLAN FREE TEXT DISPLAY PLAN FREE TEXT DISPLAY PLAN FREE TEXT DISPLAY PLAN FREE TEXT DISPLAY PLAN FREE TEXT never used

## 2024-04-02 NOTE — H&P PST ADULT - ENMT COMMENTS
excessive saliva production, unable to asses Mallampati non verbal, +dental caries, and periodontal disease, denies loose teeth

## 2024-04-02 NOTE — H&P PST ADULT - PROBLEM SELECTOR PLAN 1
Patient tentatively scheduled for comprehensive dental treatment under general anesthesia for 4/10/24. Pre-op instructions provided to group home staff. Group home staff given verbal and written instructions with teach back on pepcid. Pt verbalized understanding with return demonstration.     Group home staff reports, pt had labs drawn last week. Pending copy of lab results.    Pt.'s cousin Meche Walls [phone] will be available to give consent on DOS.

## 2024-04-02 NOTE — H&P PST ADULT - NSICDXPASTSURGICALHX_GEN_ALL_CORE_FT
PAST SURGICAL HISTORY:  Colostomy present     H/O abdominal surgery     H/O dental restoration     S/P laparotomy

## 2024-04-02 NOTE — H&P PST ADULT - NSICDXPASTMEDICALHX_GEN_ALL_CORE_FT
PAST MEDICAL HISTORY:  Autism     Cataract     Constipation     Dental caries     Depression, major     Gait abnormality     Hernia of abdominal wall     HLD (hyperlipidemia)     Hypersalivation     Hypothyroidism     Mentally disabled     Nonverbal     OP (osteoporosis)     Profound mental retardation

## 2024-04-11 PROBLEM — E78.5 HYPERLIPIDEMIA, UNSPECIFIED: Chronic | Status: ACTIVE | Noted: 2024-04-02

## 2024-04-12 PROBLEM — E03.9 HYPOTHYROIDISM, UNSPECIFIED: Chronic | Status: ACTIVE | Noted: 2024-04-02

## 2024-05-07 ENCOUNTER — APPOINTMENT (OUTPATIENT)
Dept: MAMMOGRAPHY | Facility: CLINIC | Age: 64
End: 2024-05-07

## 2024-05-07 ENCOUNTER — APPOINTMENT (OUTPATIENT)
Dept: ULTRASOUND IMAGING | Facility: CLINIC | Age: 64
End: 2024-05-07
Payer: MEDICARE

## 2024-05-07 PROCEDURE — 76641 ULTRASOUND BREAST COMPLETE: CPT | Mod: 50,GY

## 2024-05-07 RX ORDER — DENOSUMAB 60 MG/ML
60 INJECTION SUBCUTANEOUS
Qty: 1 | Refills: 0 | Status: ACTIVE | COMMUNITY
Start: 2024-05-07 | End: 1900-01-01

## 2024-05-08 ENCOUNTER — APPOINTMENT (OUTPATIENT)
Dept: ENDOCRINOLOGY | Facility: CLINIC | Age: 64
End: 2024-05-08

## 2024-05-21 ENCOUNTER — APPOINTMENT (OUTPATIENT)
Dept: ENDOCRINOLOGY | Facility: CLINIC | Age: 64
End: 2024-05-21
Payer: MEDICARE

## 2024-05-21 PROCEDURE — 96372 THER/PROPH/DIAG INJ SC/IM: CPT

## 2024-05-21 RX ORDER — DENOSUMAB 60 MG/ML
60 INJECTION SUBCUTANEOUS
Qty: 1 | Refills: 0 | Status: COMPLETED | OUTPATIENT
Start: 2024-05-21

## 2024-05-21 RX ADMIN — DENOSUMAB 0 MG/ML: 60 INJECTION SUBCUTANEOUS at 00:00

## 2024-07-09 ENCOUNTER — OUTPATIENT (OUTPATIENT)
Dept: OUTPATIENT SERVICES | Facility: HOSPITAL | Age: 64
LOS: 1 days | End: 2024-07-09
Payer: MEDICARE

## 2024-07-09 VITALS
WEIGHT: 126.1 LBS | HEIGHT: 67 IN | RESPIRATION RATE: 17 BRPM | HEART RATE: 98 BPM | OXYGEN SATURATION: 96 % | TEMPERATURE: 98 F

## 2024-07-09 DIAGNOSIS — Z98.890 OTHER SPECIFIED POSTPROCEDURAL STATES: Chronic | ICD-10-CM

## 2024-07-09 DIAGNOSIS — K02.62 DENTAL CARIES ON SMOOTH SURFACE PENETRATING INTO DENTIN: ICD-10-CM

## 2024-07-09 DIAGNOSIS — K05.6 PERIODONTAL DISEASE, UNSPECIFIED: ICD-10-CM

## 2024-07-09 DIAGNOSIS — Z01.818 ENCOUNTER FOR OTHER PREPROCEDURAL EXAMINATION: ICD-10-CM

## 2024-07-09 DIAGNOSIS — Z93.3 COLOSTOMY STATUS: Chronic | ICD-10-CM

## 2024-07-09 DIAGNOSIS — Z98.811 DENTAL RESTORATION STATUS: Chronic | ICD-10-CM

## 2024-07-09 LAB
ANION GAP SERPL CALC-SCNC: 12 MMOL/L — SIGNIFICANT CHANGE UP (ref 5–17)
BUN SERPL-MCNC: 17 MG/DL — SIGNIFICANT CHANGE UP (ref 7–23)
CALCIUM SERPL-MCNC: 10.5 MG/DL — SIGNIFICANT CHANGE UP (ref 8.4–10.5)
CHLORIDE SERPL-SCNC: 104 MMOL/L — SIGNIFICANT CHANGE UP (ref 96–108)
CO2 SERPL-SCNC: 26 MMOL/L — SIGNIFICANT CHANGE UP (ref 22–31)
CREAT SERPL-MCNC: 0.64 MG/DL — SIGNIFICANT CHANGE UP (ref 0.5–1.3)
EGFR: 99 ML/MIN/1.73M2 — SIGNIFICANT CHANGE UP
GLUCOSE SERPL-MCNC: 63 MG/DL — LOW (ref 70–99)
HCT VFR BLD CALC: 43.7 % — SIGNIFICANT CHANGE UP (ref 34.5–45)
HGB BLD-MCNC: 14.3 G/DL — SIGNIFICANT CHANGE UP (ref 11.5–15.5)
MCHC RBC-ENTMCNC: 29.6 PG — SIGNIFICANT CHANGE UP (ref 27–34)
MCHC RBC-ENTMCNC: 32.7 GM/DL — SIGNIFICANT CHANGE UP (ref 32–36)
MCV RBC AUTO: 90.5 FL — SIGNIFICANT CHANGE UP (ref 80–100)
NRBC # BLD: 0 /100 WBCS — SIGNIFICANT CHANGE UP (ref 0–0)
PLATELET # BLD AUTO: 251 K/UL — SIGNIFICANT CHANGE UP (ref 150–400)
POTASSIUM SERPL-MCNC: 4.4 MMOL/L — SIGNIFICANT CHANGE UP (ref 3.5–5.3)
POTASSIUM SERPL-SCNC: 4.4 MMOL/L — SIGNIFICANT CHANGE UP (ref 3.5–5.3)
RBC # BLD: 4.83 M/UL — SIGNIFICANT CHANGE UP (ref 3.8–5.2)
RBC # FLD: 13 % — SIGNIFICANT CHANGE UP (ref 10.3–14.5)
SODIUM SERPL-SCNC: 142 MMOL/L — SIGNIFICANT CHANGE UP (ref 135–145)
WBC # BLD: 5.07 K/UL — SIGNIFICANT CHANGE UP (ref 3.8–10.5)
WBC # FLD AUTO: 5.07 K/UL — SIGNIFICANT CHANGE UP (ref 3.8–10.5)

## 2024-07-09 PROCEDURE — G0463: CPT

## 2024-07-09 PROCEDURE — 80048 BASIC METABOLIC PNL TOTAL CA: CPT

## 2024-07-09 PROCEDURE — 85027 COMPLETE CBC AUTOMATED: CPT

## 2024-07-09 NOTE — H&P PST ADULT - ASSESSMENT
DASI score: <4 mets  DASI activity: Pt requires total assistance  Loose teeth or denture: staff does not think patient has loose teeth

## 2024-07-09 NOTE — H&P PST ADULT - HISTORY OF PRESENT ILLNESS
63 y/o female PMH nonverbal autism bowel obstruction s/p colostomy presents to presurgical testing with diagnosis of periodontal disease and dental caries. Pt is scheduled for a comprehensive dental treatment under general anesthesia.    65 y/o female PMH profound MR, autism, bowel obstruction s/p colostomy, presents to presurgical testing with diagnosis of periodontal disease and dental caries. Pt is scheduled for comprehensive dental treatment under general anesthesia 7/29/24.       Pt is nonverbal, does not follow commands

## 2024-07-09 NOTE — H&P PST ADULT - PRO ARRIVE FROM
[facility] [phone] "speak with [facility] nurse"/group home

## 2024-07-09 NOTE — H&P PST ADULT - PROBLEM SELECTOR PLAN 1
COMPREHENSIVE DENTAL TREATMENT UNDER GENERAL ANESTHESIA  Pre-op education provided - all questions answered   CBC & BMP sent in PST  Pending preop medical eval COMPREHENSIVE DENTAL TREATMENT UNDER GENERAL ANESTHESIA  Pre-op education provided - all questions answered   CBC & BMP sent in PST  Bee Mindful completed & on chart  Pending preop medical eval

## 2024-07-09 NOTE — H&P PST ADULT - PAIN ASSESSMENT COMPLETED
Reviewed PD and Consistent Carbohydrate diet education. Pt aware RD is available if any questions arise./verbal instruction/written material/patient instructed
Yes

## 2024-07-09 NOTE — H&P PST ADULT - ENMT COMMENTS
non verbal, +dental caries, and periodontal disease, denies loose teeth non verbal, +dental caries and periodontal disease

## 2024-07-28 ENCOUNTER — TRANSCRIPTION ENCOUNTER (OUTPATIENT)
Age: 64
End: 2024-07-28

## 2024-07-29 ENCOUNTER — TRANSCRIPTION ENCOUNTER (OUTPATIENT)
Age: 64
End: 2024-07-29

## 2024-07-29 ENCOUNTER — OUTPATIENT (OUTPATIENT)
Dept: OUTPATIENT SERVICES | Facility: HOSPITAL | Age: 64
LOS: 1 days | End: 2024-07-29
Payer: MEDICARE

## 2024-07-29 VITALS
OXYGEN SATURATION: 99 % | WEIGHT: 126.1 LBS | HEART RATE: 62 BPM | TEMPERATURE: 98 F | RESPIRATION RATE: 20 BRPM | DIASTOLIC BLOOD PRESSURE: 52 MMHG | SYSTOLIC BLOOD PRESSURE: 89 MMHG | HEIGHT: 67 IN

## 2024-07-29 VITALS
HEART RATE: 80 BPM | RESPIRATION RATE: 20 BRPM | DIASTOLIC BLOOD PRESSURE: 59 MMHG | OXYGEN SATURATION: 96 % | SYSTOLIC BLOOD PRESSURE: 105 MMHG

## 2024-07-29 DIAGNOSIS — K05.6 PERIODONTAL DISEASE, UNSPECIFIED: ICD-10-CM

## 2024-07-29 DIAGNOSIS — Z98.811 DENTAL RESTORATION STATUS: Chronic | ICD-10-CM

## 2024-07-29 DIAGNOSIS — K02.62 DENTAL CARIES ON SMOOTH SURFACE PENETRATING INTO DENTIN: ICD-10-CM

## 2024-07-29 DIAGNOSIS — Z98.890 OTHER SPECIFIED POSTPROCEDURAL STATES: Chronic | ICD-10-CM

## 2024-07-29 DIAGNOSIS — Z93.3 COLOSTOMY STATUS: Chronic | ICD-10-CM

## 2024-07-29 PROCEDURE — D0210: CPT

## 2024-07-29 PROCEDURE — D7210: CPT

## 2024-07-29 PROCEDURE — D4210: CPT

## 2024-07-29 PROCEDURE — C9399: CPT

## 2024-07-29 PROCEDURE — D2394: CPT

## 2024-07-29 PROCEDURE — C1889: CPT

## 2024-07-29 DEVICE — SURGICEL 2 X 14": Type: IMPLANTABLE DEVICE | Status: FUNCTIONAL

## 2024-07-29 RX ORDER — FLUTICASONE PROPIONATE 44 MCG
0 AEROSOL WITH ADAPTER (GRAM) INHALATION
Refills: 0 | DISCHARGE

## 2024-07-29 NOTE — ASU DISCHARGE PLAN (ADULT/PEDIATRIC) - NURSING INSTRUCTIONS
Next dose of tylenol at/after_8PM___. Do not exceed 4000mg in a 24hour  period. Every 6hours as needed.

## 2024-07-29 NOTE — ASU PATIENT PROFILE, ADULT - FALL HARM RISK - HARM RISK INTERVENTIONS

## 2024-07-29 NOTE — ASU DISCHARGE PLAN (ADULT/PEDIATRIC) - NS MD DC FALL RISK RISK
For information on Fall & Injury Prevention, visit: [email or web address] OR  [email or web address] OR  https://www.cdc.gov/steadi/patient.html

## 2024-07-29 NOTE — ASU DISCHARGE PLAN (ADULT/PEDIATRIC) - ASU DC SPECIAL INSTRUCTIONSFT
comprehensive dental treatment under general anesthesia, exam, xrays, cleaning, restorations and fluoride and one extraction of one wisdom tooth performed to the upper right side and one to the upper left suide due to severe dental caries    , no straw for two days and keep head elevated for the next two days and nights     tylenol prn pain and give her tylenol for the next two days for comfort     see DR Stratton in one week, [place] [phone] [place] 8[place]8 at 12 pm     resume all medications    return to routine activities tomorrow if patient feels well

## 2025-05-20 ENCOUNTER — APPOINTMENT (OUTPATIENT)
Dept: MAMMOGRAPHY | Facility: CLINIC | Age: 65
End: 2025-05-20

## 2025-05-20 ENCOUNTER — APPOINTMENT (OUTPATIENT)
Dept: ULTRASOUND IMAGING | Facility: CLINIC | Age: 65
End: 2025-05-20
Payer: MEDICARE

## 2025-05-20 PROCEDURE — 76641 ULTRASOUND BREAST COMPLETE: CPT | Mod: 50,GA

## 2025-05-23 ENCOUNTER — NON-APPOINTMENT (OUTPATIENT)
Age: 65
End: 2025-05-23

## 2025-06-16 ENCOUNTER — APPOINTMENT (OUTPATIENT)
Dept: ENDOCRINOLOGY | Facility: CLINIC | Age: 65
End: 2025-06-16

## 2025-06-16 PROCEDURE — 96372 THER/PROPH/DIAG INJ SC/IM: CPT

## 2025-06-16 RX ORDER — DENOSUMAB 60 MG/ML
60 INJECTION SUBCUTANEOUS
Qty: 1 | Refills: 0 | Status: COMPLETED | OUTPATIENT
Start: 2025-06-16

## 2025-06-16 RX ADMIN — DENOSUMAB 0 MG/ML: 60 INJECTION SUBCUTANEOUS at 00:00

## 2025-07-09 NOTE — PATIENT PROFILE ADULT - TOBACCO USE
Tolerating statin     I spent a total of _30__ minutes with the patient today, discussing their symptoms, conducting an examination, and reviewing the patient's diagnostic test results.    Never smoker

## 2025-08-27 ENCOUNTER — APPOINTMENT (OUTPATIENT)
Dept: ENDOCRINOLOGY | Facility: CLINIC | Age: 65
End: 2025-08-27
Payer: MEDICARE

## 2025-08-27 DIAGNOSIS — M81.0 AGE-RELATED OSTEOPOROSIS W/OUT CURRENT PATHOLOGICAL FRACTURE: ICD-10-CM

## 2025-08-27 PROCEDURE — 99214 OFFICE O/P EST MOD 30 MIN: CPT

## 2025-09-02 RX ORDER — EZETIMIBE 10 MG/1
10 TABLET ORAL DAILY
Qty: 90 | Refills: 3 | Status: ACTIVE | COMMUNITY

## 2025-09-02 RX ORDER — OXCARBAZEPINE 300 MG/1
300 TABLET, FILM COATED ORAL 3 TIMES DAILY
Refills: 0 | Status: ACTIVE | COMMUNITY

## 2025-09-02 RX ORDER — PROBIOTIC PRODUCT - TAB 1B-250 MG
TAB ORAL
Refills: 0 | Status: ACTIVE | COMMUNITY

## 2025-09-02 RX ORDER — ROSUVASTATIN CALCIUM 10 MG/1
10 TABLET, FILM COATED ORAL
Qty: 30 | Refills: 0 | Status: ACTIVE | COMMUNITY

## (undated) DEVICE — DRAPE INSTRUMENT POUCH 6.75" X 11"

## (undated) DEVICE — VENODYNE/SCD SLEEVE CALF MEDIUM

## (undated) DEVICE — WARMING BLANKET LOWER ADULT

## (undated) DEVICE — DRAPE MAGNETIC INSTRUMENT MEDIUM

## (undated) DEVICE — ELCTR BOVIE TIP NEEDLE INSULATED 2.8" EDGE

## (undated) DEVICE — SOL IRR POUR H2O 250ML

## (undated) DEVICE — ELCTR BOVIE PENCIL SMOKE EVACUATION

## (undated) DEVICE — POSITIONER FOAM EGG CRATE ULNAR 2PCS (PINK)

## (undated) DEVICE — PACK DENTAL

## (undated) DEVICE — DRAPE TOWEL BLUE 17" X 24"

## (undated) DEVICE — SUT CHROMIC 3-0 30" C-13

## (undated) DEVICE — SOL IRR POUR NS 0.9% 500ML

## (undated) DEVICE — SPONGE END-STRUNG CYLINDRICAL .5X1.5"

## (undated) DEVICE — MEDICATION LABELS W MARKER

## (undated) DEVICE — GLV 6 PROTEXIS (BLUE)